# Patient Record
Sex: MALE | Race: AMERICAN INDIAN OR ALASKA NATIVE | NOT HISPANIC OR LATINO | Employment: UNEMPLOYED | URBAN - METROPOLITAN AREA
[De-identification: names, ages, dates, MRNs, and addresses within clinical notes are randomized per-mention and may not be internally consistent; named-entity substitution may affect disease eponyms.]

---

## 2022-09-30 ENCOUNTER — MEDICAL CORRESPONDENCE (OUTPATIENT)
Dept: HEALTH INFORMATION MANAGEMENT | Facility: CLINIC | Age: 37
End: 2022-09-30

## 2023-07-12 ENCOUNTER — TRANSFERRED RECORDS (OUTPATIENT)
Dept: HEALTH INFORMATION MANAGEMENT | Facility: CLINIC | Age: 38
End: 2023-07-12

## 2023-09-06 ENCOUNTER — TELEPHONE (OUTPATIENT)
Dept: WOUND CARE | Facility: CLINIC | Age: 38
End: 2023-09-06
Payer: COMMERCIAL

## 2023-09-06 ENCOUNTER — MEDICAL CORRESPONDENCE (OUTPATIENT)
Dept: HEALTH INFORMATION MANAGEMENT | Facility: CLINIC | Age: 38
End: 2023-09-06

## 2023-09-06 NOTE — TELEPHONE ENCOUNTER
Consult received via Fax from Lou Contreras PA-C for wound of the Left foot. Please call Kristina Referral coordinator at [phone] to schedule.    Please schedule with Dr. Koehler, Dr. Simmons, Kisha Floyd PA-C, or Meseret Ding NP at [facility] [place] for next available appointment.    **For all providers, Kisha Floyd PA-C, Dr. Koehler, Meseret Ding NP or Dr. Lake, please schedule a follow up 2-3 weeks after initial appointment.**  --If unable to schedule within 2-3 weeks then please place on cancellation list--    Is the patient able to make their own medical decisions? Yes    Is patient a REMY lift? PLEASE INQUIRE WHEN MAKING THE APPOINTMENT AND PUT IN APPOINTMENT NOTES    Routing to  Wound Healing Scheduling.

## 2023-09-07 NOTE — TELEPHONE ENCOUNTER
Kristina from patient PCP office called to schedule appointment for patient.    Kristina- [phone]

## 2023-10-06 ENCOUNTER — TELEPHONE (OUTPATIENT)
Dept: OTHER | Facility: CLINIC | Age: 38
End: 2023-10-06

## 2023-10-06 ENCOUNTER — HOSPITAL ENCOUNTER (OUTPATIENT)
Dept: WOUND CARE | Facility: CLINIC | Age: 38
Discharge: HOME | End: 2023-10-06
Attending: PHYSICIAN ASSISTANT | Admitting: PHYSICIAN ASSISTANT
Payer: COMMERCIAL

## 2023-10-06 VITALS — SYSTOLIC BLOOD PRESSURE: 101 MMHG | TEMPERATURE: 97.3 F | HEART RATE: 81 BPM | DIASTOLIC BLOOD PRESSURE: 55 MMHG

## 2023-10-06 DIAGNOSIS — L97.422 SKIN ULCER OF LEFT HEEL WITH FAT LAYER EXPOSED (H): Primary | ICD-10-CM

## 2023-10-06 PROCEDURE — 97602 WOUND(S) CARE NON-SELECTIVE: CPT

## 2023-10-06 PROCEDURE — 99205 OFFICE O/P NEW HI 60 MIN: CPT | Performed by: PHYSICIAN ASSISTANT

## 2023-10-06 PROCEDURE — G0463 HOSPITAL OUTPT CLINIC VISIT: HCPCS | Mod: 25

## 2023-10-06 NOTE — TELEPHONE ENCOUNTER
Future Appointments   Date Time Provider Department Center   10/18/2023  1:10 PM SHUS5 LINNEA [facility]   10/18/2023  2:40 PM [place] Moctezuma MD [facility]       Visit guide faxed to care facility at 12:50 on 10/6  [phone]

## 2023-10-06 NOTE — PROGRESS NOTES
Patient arrived for wound care visit. Accompany by nurse Weaver from [facility]. Wound Care Nurse time spent evaluating patient record, completed a full evaluation and documented wound(s) & emma-wound skin; provided recommendation based on treatment plan. Applied dressing, reviewed discharge instructions, patient education, and discussed plan of care with appropriate medical team staff members and patient and/or family members.

## 2023-10-06 NOTE — DISCHARGE INSTRUCTIONS
Jody Jenkins      1985    A DME order for supplies has been placed to [facility], [address]. If there are any issues with your order including not receiving the order please call [facility] at [phone] option 1. They can also provide a tracking number for you if you had supplies shipped to you.  Future DME orders may go to [facility]    Dressing changes outside of clinic are being performed by  shelter staff    [facility]; nurse Meg; phone [phone] fax [phone]  Meg will teach staff to do dressing changes; can be done daily or twice daily if needed    -Vascular medicine consult pending per discussion 10/6/23  Vascular medicine consult order entered.  Please call the scheduling  to schedule your ABIs bilateral appointment at [facility]: [phone]     OK to shower; remove dressings prior to shower; reapply immediately following shower.     Wound Dressing Change: left plantar heel  - Wash your hands with soap and water before you begin your dressing change and prepare a clean surface for dressings.  - Cleanse with mild unscented soap (such as Cetaphil, Cerave or Dove) and water  - Apply small amount of VASHE on gauze, lay into wound bed, let sit for 10 minutes, remove gauze (do not rinse)   - Apply cut to fit 1/12th piece of 4x5 Hydrofera Blue Transfer Ready foam into wound, full contact to wound surfaces, as wound filler daily  - cover with two 4x4 gauze  - cover with one 5x9 ABD gauze   - secure with one roll of conforming gauze  - tape with medical tape  - wear sleeve of Spandgrip F from toes to ankle and Spandigrip G from ankle to below knee for edema control  - once you get the Velcro compression sleeves for calf and foot, you may wear these instead of spandigrip  Change daily and if wet, saturated    Prevalon boots recommended; this is an out of pocket cost; available on [employer]. This would serve as pressure relief.  Pressure relief is going to assist healing. Offload heel as much as possible, whether seated or lying. This means floating your heel so that no pressure at all is endured to wound.     Compression:   Your compression is Spandigrip F to feet, G to calf today (and use until velcro compression wraps arrive as ordered) and can be removed at night and put back on first thing in the morning.   Please remove compression dressing if toes turn blue and/or tingle and can not be relieved by raising the leg for one hour. If unable to reapply in the morning, keep compression on until next dressing change.    Walk as much as you can, as you are able. Whenever you sit raise your ankle above your hips to promote wound healing.       Natty Floyd PA-C October 6, 2023    Call us at [phone] if you have any questions about your wounds, have redness or swelling around your wound, have a fever of 101 degrees Fahrenheit or greater or if you have any other problems or concerns. We answer the phone Monday through Friday 8 am to 4 pm, please leave a message as we check the voicemail frequently throughout the day.     If you had a positive experience please indicate that on your patient satisfaction survey form that [facility] will be sending you.    It was a pleasure meeting with you today.  Thank you for allowing me and my team the privilege of caring for you today.  YOU are the reason we are here, and I truly hope we provided you with the excellent service you deserve.  Please let us know if there is anything else we can do for you so that we can be sure you are leaving completely satisfied with your care experience.      If you have any billing related questions please call the [facility] Business office at [phone]. The clinic staff does not handle billing related matters.    If you are scheduled to have a follow up appointment, you will receive a reminder call the day before your visit. On the appointment day please  arrive 15 minutes prior to your appointment time. If you are unable to keep that appointment, please call the clinic to cancel or reschedule. If you are more than 10 minutes late or greater for your scheduled appointment time, the clinic policy is that you may be asked to reschedule.

## 2023-10-06 NOTE — PROGRESS NOTES
[facility]    ASSESSMENT:   (L97.422) Skin ulcer of left heel with fat layer exposed (H)  (primary encounter diagnosis)  Absent pedal pulse  Lymphedema  Hx of MRI suspicious for early osteomyelitis treated via antibiotics only  Cognitive impairment - unable to make own medical decisions    PLAN/DISCUSSION:   Discuss repeating MRI at next appointment  Patient has guardian - they have been contacted to get paperwork before any debridements can be completed  Agree with PCP on vascular surgery consult due to absent DP pulse  Wound care plan: Hydrofera Blue transfer ready, roll gauze, velcro or spandagrip. Dressing will be performed by facility staff See bottom of note for detailed wound care and patient instructions  Instructed him to keep pressure off his foot    HISTORY OF PRESENT ILLNESS:   Jody Jenkins is a 38 year old male with cognitive impairment, TBI, antisocial personality disorder, quadriplegia, venous insufficiency, hx of DVT who presents today for a left heel ulcer.  This started sometime in early 2022 after he was traveling a lot and developed a blister on the bottom of his foot. It sounds like at that time he was having issues with self care and homelessness and eventually underwent guardianship. He was hospitalized several for the infection related to the wound. First, in March of 2022 at [facility] in [place]. Underwent operative excision with bone cultures. The culture were positive and he was placed on prolonged oral antibiotic treatment. He was hospitalized again in September of 2022 and there was some question of early osteomyelitis on MRI. A BKA was suggested but declined by the guardian as he was not sick.  Since then he has been cared for primarily by [facility] PCP and his home care staff. They have had difficulty obtaining dressings for him.     TREATMENT COURSE:  10/6/2023 : Presents for initial visit at our clinic.     VITALS: /55 (BP Location: Right arm, Patient  Position: Chair, Cuff Size: Adult Regular)   Pulse 81   Temp 97.3  F (36.3  C) (Temporal)      PHYSICAL EXAM:  GENERAL: Patient is alert and oriented and in no acute distress  CV: PT pulse palpable, absent DP pulse  INTEGUMENTARY:   Wound (used by OP [facility] only) 10/06/23 0905 Left plantar heel (Active)   Thickness/Stage Stage 3 10/06/23 0905   Base pink;yellow;slough 10/06/23 0905   Periwound dry;macerated 10/06/23 0905   Periwound Temperature warm 10/06/23 0905   Periwound Skin Turgor firm 10/06/23 0905   Edges callused;open;rolled/closed 10/06/23 0905   Length (cm) 3 10/06/23 0905   Width (cm) 2.5 10/06/23 0905   Depth (cm) 1 10/06/23 0905   Wound (cm^2) 7.5 cm^2 10/06/23 0905   Wound Volume (cm^3) 7.5 cm^3 10/06/23 0905   Drainage Characteristics/Odor serosanguineous 10/06/23 0905   Drainage Amount moderate 10/06/23 0905   Care, Wound non-select wound debridement performed 10/06/23 0905             MDM: 60 minutes were spent on the date of the visit reviewing previous chart notes, evaluating patient and developing the treatment plan, this excludes any time spent on procedures numerous notes reviewed as referenced in chart, multiple problems assessed    PATIENT INSTRUCTIONS      Further instructions from your care team         Jody Jenkins      1985    A DME order for supplies has been placed to [facility], [address]. If there are any issues with your order including not receiving the order please call [facility] at [phone] option 1. They can also provide a tracking number for you if you had supplies shipped to you.  Future DME orders may go to [facility]    Dressing changes outside of clinic are being performed by  [facility] staff    [facility]; nurse Meg; phone [phone] fax [phone]  Meg will teach staff to do dressing changes; can be done daily or twice daily if needed    -Vascular medicine consult pending per discussion 10/6/23  Vascular medicine  consult order entered    OK to shower; remove dressings prior to shower; reapply immediately following shower.     Wound Dressing Change: left plantar heel  - Wash your hands with soap and water before you begin your dressing change and prepare a clean surface for dressings.  - Cleanse with mild unscented soap (such as Cetaphil, Cerave or Dove) and water  - Apply small amount of VASHE on gauze, lay into wound bed, let sit for 10 minutes, remove gauze (do not rinse)   - Apply cut to fit 1/12th piece of 4x5 Hydrofera Blue Transfer Ready foam into wound, full contact to wound surfaces, as wound filler daily  - cover with two 4x4 gauze  - cover with one 5x9 ABD gauze   - secure with one roll of conforming gauze  - tape with medical tape  - wear sleeve of Spandgrip F from toes to ankle and Spandigrip G from ankle to below knee for edema control  Change daily and if wet, saturated    Prevalon boots recommended; this is an out of pocket cost; available on [employer]. This would serve as pressure relief. Pressure relief is going to assist healing.    Compression:   Your compression is Spandigrip F to feet, G to calf today (and use until velcro compression wraps arrive as ordered) and can be removed at night and put back on first thing in the morning.   Please remove compression dressing if toes turn blue and/or tingle and can not be relieved by raising the leg for one hour. If unable to reapply in the morning, keep compression on until next dressing change.    Walk as much as you can, as you are able. Whenever you sit raise your ankle above your hips to promote wound healing.       Natty Floyd PA-C October 6, 2023    Call us at [phone] if you have any questions about your wounds, have redness or swelling around your wound, have a fever of 101 degrees Fahrenheit or greater or if you have any other problems or concerns. We answer the phone Monday through Friday 8 am to 4 pm, please leave a message as we check the  voicemail frequently throughout the day.     If you had a positive experience please indicate that on your patient satisfaction survey form that [facility] will be sending you.    It was a pleasure meeting with you today.  Thank you for allowing me and my team the privilege of caring for you today.  YOU are the reason we are here, and I truly hope we provided you with the excellent service you deserve.  Please let us know if there is anything else we can do for you so that we can be sure you are leaving completely satisfied with your care experience.      If you have any billing related questions please call the [facility] Business office at [phone]. The clinic staff does not handle billing related matters.    If you are scheduled to have a follow up appointment, you will receive a reminder call the day before your visit. On the appointment day please arrive 15 minutes prior to your appointment time. If you are unable to keep that appointment, please call the clinic to cancel or reschedule. If you are more than 10 minutes late or greater for your scheduled appointment time, the clinic policy is that you may be asked to reschedule.          Electronically signed by Natty Folyd PA-C on October 6, 2023

## 2023-10-06 NOTE — TELEPHONE ENCOUNTER
Referral received via [employer] on 10/6/23.    Pt referred to [facility] by Natty Floyd PA-C for non-healing wound.     Pt needs to be scheduled for CASSANDRA US ordered by referring provider (Natty Floyd) and NEW VASCULAR PATIENT consult with vascular surgery.  Will route to scheduling to coordinate an appointment next week.    Appt note:  Pt referred to [facility] by Natty Floyd PA-C for non-healing wound. CASSANDRA US prior    Karina ALBARADO, RN    [facility]  Office: [phone]  Fax: [phone]

## 2023-10-10 ENCOUNTER — TELEPHONE (OUTPATIENT)
Dept: WOUND CARE | Facility: CLINIC | Age: 38
End: 2023-10-10
Payer: COMMERCIAL

## 2023-10-10 NOTE — TELEPHONE ENCOUNTER
The guardian for Jody returned call to the clinic. He will fax the guardian paperwork to [facility]. His name is Candelario Cote Office phone [phone]  Cell [phone]. Guardian paperwork sent to Barry Aldrich.

## 2023-10-17 ENCOUNTER — DOCUMENTATION ONLY (OUTPATIENT)
Dept: OTHER | Facility: CLINIC | Age: 38
End: 2023-10-17
Payer: COMMERCIAL

## 2023-10-18 ENCOUNTER — HOSPITAL ENCOUNTER (OUTPATIENT)
Dept: ULTRASOUND IMAGING | Facility: CLINIC | Age: 38
Discharge: HOME | End: 2023-10-18
Attending: PHYSICIAN ASSISTANT
Payer: COMMERCIAL

## 2023-10-18 ENCOUNTER — OFFICE VISIT (OUTPATIENT)
Dept: OTHER | Facility: CLINIC | Age: 38
End: 2023-10-18
Attending: PHYSICIAN ASSISTANT
Payer: COMMERCIAL

## 2023-10-18 VITALS — HEART RATE: 84 BPM | DIASTOLIC BLOOD PRESSURE: 69 MMHG | SYSTOLIC BLOOD PRESSURE: 102 MMHG

## 2023-10-18 DIAGNOSIS — L97.422 SKIN ULCER OF LEFT HEEL WITH FAT LAYER EXPOSED (H): ICD-10-CM

## 2023-10-18 DIAGNOSIS — E66.01 MORBID OBESITY (H): ICD-10-CM

## 2023-10-18 DIAGNOSIS — G82.50 SPASTIC TETRAPLEGIA (H): Primary | ICD-10-CM

## 2023-10-18 DIAGNOSIS — R41.89 COGNITIVE IMPAIRMENT: ICD-10-CM

## 2023-10-18 PROCEDURE — G0463 HOSPITAL OUTPT CLINIC VISIT: HCPCS | Performed by: SURGERY

## 2023-10-18 PROCEDURE — 93922 UPR/L XTREMITY ART 2 LEVELS: CPT

## 2023-10-18 PROCEDURE — 99203 OFFICE O/P NEW LOW 30 MIN: CPT | Performed by: SURGERY

## 2023-10-18 RX ORDER — OXYCODONE AND ACETAMINOPHEN 5; 325 MG/1; MG/1
TABLET ORAL
COMMUNITY
Start: 2022-10-03 | End: 2023-10-26

## 2023-10-18 RX ORDER — ACETAMINOPHEN 325 MG/1
TABLET ORAL
COMMUNITY
Start: 2022-10-04 | End: 2023-10-18

## 2023-10-18 NOTE — PATIENT INSTRUCTIONS
Lack of healing on the wound on your foot is not due to poor circulation  The MRI you had done in [place] showed possible bone infection  Would recommend referral to a surgical podiatrist  We will update Natty Floyd PA-C (the provider that referred you to us) and she should be able to provide a referral to a surgical podiatrist  No further vascular surgery follow up indicated at this time

## 2023-10-18 NOTE — PROGRESS NOTES
[facility]        Patient is here for a consult to discuss Wound.    Pt is currently taking no meds that would impact our treatment plan.    /69 (BP Location: Right arm, Patient Position: Chair, Cuff Size: Adult Regular)   Pulse 84     The provider has been notified that the patient has no concerns.     Questions patient would like addressed today are: N/A.    Refills are needed: N/A    Has homecare services and agency name:  Sintia Valverde MA
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
## 2023-10-31 ENCOUNTER — HOSPITAL ENCOUNTER (OUTPATIENT)
Dept: WOUND CARE | Facility: CLINIC | Age: 38
Discharge: HOME | End: 2023-10-31
Attending: PHYSICIAN ASSISTANT | Admitting: PHYSICIAN ASSISTANT
Payer: COMMERCIAL

## 2023-10-31 VITALS
HEART RATE: 84 BPM | RESPIRATION RATE: 20 BRPM | DIASTOLIC BLOOD PRESSURE: 68 MMHG | TEMPERATURE: 98.8 F | SYSTOLIC BLOOD PRESSURE: 114 MMHG

## 2023-10-31 DIAGNOSIS — G89.29 OTHER CHRONIC PAIN: ICD-10-CM

## 2023-10-31 DIAGNOSIS — L97.422 SKIN ULCER OF LEFT HEEL WITH FAT LAYER EXPOSED (H): Primary | ICD-10-CM

## 2023-10-31 PROCEDURE — 97602 WOUND(S) CARE NON-SELECTIVE: CPT

## 2023-10-31 PROCEDURE — 99214 OFFICE O/P EST MOD 30 MIN: CPT | Performed by: PHYSICIAN ASSISTANT

## 2023-10-31 RX ORDER — OXYCODONE AND ACETAMINOPHEN 5; 325 MG/1; MG/1
TABLET ORAL
COMMUNITY
Start: 2023-10-31 | End: 2023-11-24

## 2023-10-31 NOTE — DISCHARGE INSTRUCTIONS
Jody Jenkins      1985    A DME order for supplies has been placed to [facility]. If there are any issues with your order including not receiving the order please call [facility] at [phone]. They can also provide a tracking number for you.    Dressing changes outside of clinic are being performed by  alf Staff     [facility]; nurse Meg; phone [phone] fax [phone]  Meg will teach staff to do dressing changes; can be done daily or twice daily if needed     PLAN:  Establish care with Primary Care Physician to manage your pain medications - consider trying a nerve blocking medication like gabapentin  Vascular medicine consult order entered.  Please call the scheduling  to schedule your ABIs bilateral appointment at [facility]: [phone]   OK to shower; remove dressings prior to shower; reapply immediately following shower.      Wound Dressing Change: left plantar heel  - Wash your hands with soap and water before you begin your dressing change and prepare a clean surface for dressings.  - Cleanse with mild unscented soap (such as Cetaphil, Cerave or Dove) and water  - Apply small amount of VASHE on gauze, lay into wound bed, let sit for 10 minutes, remove gauze (do not rinse)   - Apply cut to fit 1/10th piece of 4x5 Hydrofera Blue Transfer Ready foam into wound, full contact to wound surfaces, as wound filler daily  - cover with one 4x4 gauze  - cover with 1/2 5x9 ABD gauze   - secure with one roll of conforming gauze  - tape with medical tape  - wear sleeve of Spandgrip F from toes to ankle and Spandigrip G from ankle to below knee for edema control  - once you get the Velcro compression sleeves for calf and foot, you may wear these instead of spandigrip  Change daily and as needed for soilage/saturation     Prevalon boots recommended; this is an out of pocket cost; available on [employer]. This would serve as pressure relief. Pressure  relief is going to assist healing. Offload heel as much as possible, whether seated or lying. This means floating your heel so that no pressure at all is endured to wound.      Compression:   Your compression is Spandigrip F to feet, G to calf today (and use until velcro compression wraps arrive as ordered) and can be removed at night and put back on first thing in the morning.   Please remove compression dressing if toes turn blue and/or tingle and can not be relieved by raising the leg for one hour. If unable to reapply in the morning, keep compression on until next dressing change.     Walk as much as you can, as you are able. Whenever you sit raise your ankle above your hips to promote wound healing.           Natty Floyd PA-C October 31, 2023    Call us at [phone] if you have any questions about your wounds, have redness or swelling around your wound, have a fever of 101 degrees Fahrenheit or greater or if you have any other problems or concerns. We answer the phone Monday through Friday 8 am to 4 pm, please leave a message as we check the voicemail frequently throughout the day.     If you had a positive experience please indicate that on your patient satisfaction survey form that [facility] will be sending you.    It was a pleasure meeting with you today.  Thank you for allowing me and my team the privilege of caring for you today.  YOU are the reason we are here, and I truly hope we provided you with the excellent service you deserve.  Please let us know if there is anything else we can do for you so that we can be sure you are leaving completely satisfied with your care experience.      If you have any billing related questions please call the [facility] Business office at [phone]. The clinic staff does not handle billing related matters.    If you are scheduled to have a follow up appointment, you will receive a reminder call the day before your visit. On the appointment day please  arrive 15 minutes prior to your appointment time. If you are unable to keep that appointment, please call the clinic to cancel or reschedule. If you are more than 10 minutes late or greater for your scheduled appointment time, the clinic policy is that you may be asked to reschedule.

## 2023-10-31 NOTE — PROGRESS NOTES
"Patient Active Problem List   Diagnosis    Skin ulcer of left heel with fat layer exposed (H)     No past medical history on file.  Labs: No results for input(s): \"ALBUMIN\", \"GLUCOSE\", \"HGB\", \"INR\", \"WBC\", \"A1C\", \"CRP\" in the last 76309 hours.    Invalid input(s): \"PREALBUMIN\", \"MICROBIO\"  Nutrition requirements were discussed with patient today.  Vitals:  /68 (BP Location: Right arm, Patient Position: Chair)   Pulse 84   Temp 98.8  F (37.1  C) (Temporal)   Resp 20   Wound:   Wound (used by OP [facility] only) 10/06/23 0905 Left plantar heel (Active)   Thickness/Stage Stage 3 10/31/23 1405   Base pink 10/31/23 1405   Periwound macerated;intact;swelling 10/31/23 1405   Periwound Temperature warm 10/31/23 1405   Periwound Skin Turgor firm 10/31/23 1405   Edges callused;open 10/31/23 1405   Length (cm) 2.5 10/31/23 1405   Width (cm) 2.9 10/31/23 1405   Depth (cm) 1 10/31/23 1405   Wound (cm^2) 7.25 cm^2 10/31/23 1405   Wound Volume (cm^3) 7.25 cm^3 10/31/23 1405   Wound healing % 3.33 10/31/23 1405   Drainage Characteristics/Odor serosanguineous 10/31/23 1405   Drainage Amount large 10/31/23 1405   Care, Wound non-select wound debridement performed. 10/31/23 1405      Photo:         Further instructions from your care team         Jody Jenkins      1985    A DME order for supplies has been placed to [facility]. If there are any issues with your order including not receiving the order please call [facility] at [phone]. They can also provide a tracking number for you.    Dressing changes outside of clinic are being performed by  intermediate Staff     [facility]; nurse Meg; phone [phone] fax [phone]  Meg will teach staff to do dressing changes; can be done daily or twice daily if needed     PLAN:  Establish care with Primary Care Physician to manage your pain medications - consider trying a nerve blocking medication like gabapentin  Vascular medicine consult order " entered.  Please call the scheduling  to schedule your ABIs bilateral appointment at [facility]: [phone]   OK to shower; remove dressings prior to shower; reapply immediately following shower.      Wound Dressing Change: left plantar heel  - Wash your hands with soap and water before you begin your dressing change and prepare a clean surface for dressings.  - Cleanse with mild unscented soap (such as Cetaphil, Cerave or Dove) and water  - Apply small amount of VASHE on gauze, lay into wound bed, let sit for 10 minutes, remove gauze (do not rinse)   - Apply cut to fit 1/10th piece of 4x5 Hydrofera Blue Transfer Ready foam into wound, full contact to wound surfaces, as wound filler daily  - cover with one 4x4 gauze  - cover with 1/2 5x9 ABD gauze   - secure with one roll of conforming gauze  - tape with medical tape  - wear sleeve of Spandgrip F from toes to ankle and Spandigrip G from ankle to below knee for edema control  - once you get the Velcro compression sleeves for calf and foot, you may wear these instead of spandigrip  Change daily and as needed for soilage/saturation     Prevalon boots recommended; this is an out of pocket cost; available on [employer]. This would serve as pressure relief. Pressure relief is going to assist healing. Offload heel as much as possible, whether seated or lying. This means floating your heel so that no pressure at all is endured to wound.      Compression:   Your compression is Spandigrip F to feet, G to calf today (and use until velcro compression wraps arrive as ordered) and can be removed at night and put back on first thing in the morning.   Please remove compression dressing if toes turn blue and/or tingle and can not be relieved by raising the leg for one hour. If unable to reapply in the morning, keep compression on until next dressing change.     Walk as much as you can, as you are able. Whenever you sit raise your ankle above your hips to  promote wound healing.           Natty Floyd PA-C October 31, 2023    Call us at [phone] if you have any questions about your wounds, have redness or swelling around your wound, have a fever of 101 degrees Fahrenheit or greater or if you have any other problems or concerns. We answer the phone Monday through Friday 8 am to 4 pm, please leave a message as we check the voicemail frequently throughout the day.     If you had a positive experience please indicate that on your patient satisfaction survey form that [facility] will be sending you.    It was a pleasure meeting with you today.  Thank you for allowing me and my team the privilege of caring for you today.  YOU are the reason we are here, and I truly hope we provided you with the excellent service you deserve.  Please let us know if there is anything else we can do for you so that we can be sure you are leaving completely satisfied with your care experience.      If you have any billing related questions please call the [facility] Business office at [phone]. The clinic staff does not handle billing related matters.    If you are scheduled to have a follow up appointment, you will receive a reminder call the day before your visit. On the appointment day please arrive 15 minutes prior to your appointment time. If you are unable to keep that appointment, please call the clinic to cancel or reschedule. If you are more than 10 minutes late or greater for your scheduled appointment time, the clinic policy is that you may be asked to reschedule.

## 2023-10-31 NOTE — PROGRESS NOTES
[facility]    ASSESSMENT:   (L97.422) Skin ulcer of left heel with fat layer exposed (H)  (primary encounter diagnosis)  Lymphedema  Hx of MRI suspicious for early osteomyelitis treated via antibiotics only  Cognitive impairment - unable to make own medical decisions, legal guardian in place    PLAN/DISCUSSION:   After the visit I did thorough chart review and was unable to find records of a bone biopsy, I will have our staff try to track this down  Encouraged him to wear compression around the clock  Wound care plan: Hydrofera Blue transfer ready, roll gauze, velcro or spandagrip. Dressing will be performed by facility staff See bottom of note for detailed wound care and patient instructions  Instructed him to keep pressure off his foot    INTERVAL HX:  10/18: Met with Dr. Moctezuma with Vascular. Determined adequate arterial perfusion for healing.   10/31/23: Returns to clinic. We discussed today that he had an MRI suspicious for early osteomyelitis in September of 2022 and that I would like to repeat an MRI. He reports that he has since had a bone biopsy that was negative. He is happy with progress of wound and would like to hold off on any further imaging. The wound appears slightly improved but not significantly changed from last visit. He is not wearing compression at the visit although he reports that he is using it regularly. Reports continued significant drainage. He continues to be bothered by pain to the tough.     HISTORY OF PRESENT ILLNESS:   Jody Jenkins is a 38 year old male with cognitive impairment, TBI, antisocial personality disorder, quadriplegia, venous insufficiency, hx of DVT who presents today for a left heel ulcer.  This started sometime in early 2022 after he was traveling a lot and developed a blister on the bottom of his foot. It sounds like at that time he was having issues with self care and homelessness and eventually underwent guardianship. He was hospitalized  several for the infection related to the wound. First, in March of 2022 at [facility] in [place]. Underwent operative excision with bone cultures. The culture were positive and he was placed on prolonged oral antibiotic treatment. He was hospitalized again in September of 2022 and there was some question of early osteomyelitis on MRI. A BKA was suggested but declined by the guardian as he was not sick.  Since then he has been cared for primarily by [facility] PCP and his home care staff. They have had difficulty obtaining dressings for him.     TREATMENT COURSE:  10/6/2023 : Presents for initial visit at our clinic. Recommended Hydrofera Blue Transfer, roll gauze, velcro or spandagrip. Referred to vascular surgery for consult.    VITALS: /68 (BP Location: Right arm, Patient Position: Chair)   Pulse 84   Temp 98.8  F (37.1  C) (Temporal)   Resp 20      PHYSICAL EXAM:  GENERAL: Patient is alert and oriented and in no acute distress  CV: PT pulse palpable, absent DP pulse  INTEGUMENTARY:   Wound (used by OP [facility] only) 10/06/23 0905 Left plantar heel (Active)   Thickness/Stage Stage 3 10/31/23 1405   Base pink 10/31/23 1405   Periwound macerated;intact;swelling 10/31/23 1405   Periwound Temperature warm 10/31/23 1405   Periwound Skin Turgor firm 10/31/23 1405   Edges callused;open 10/31/23 1405   Length (cm) 2.5 10/31/23 1405   Width (cm) 2.9 10/31/23 1405   Depth (cm) 1 10/31/23 1405   Wound (cm^2) 7.25 cm^2 10/31/23 1405   Wound Volume (cm^3) 7.25 cm^3 10/31/23 1405   Wound healing % 3.33 10/31/23 1405   Drainage Characteristics/Odor serosanguineous 10/31/23 1405   Drainage Amount large 10/31/23 1405   Care, Wound non-select wound debridement performed. 10/31/23 1405                 MDM: 30 minutes were spent on the date of the visit reviewing previous chart notes, evaluating patient and developing the treatment plan, this excludes any time spent on procedures.     PATIENT INSTRUCTIONS      Further instructions  from your care team         Jody Jenkins      1985    A DME order for supplies has been placed to [facility]. If there are any issues with your order including not receiving the order please call [facility] at [phone]. They can also provide a tracking number for you.    Dressing changes outside of clinic are being performed by  half-way Staff     [facility]; nurse Meg; phone [phone] fax [phone]  Meg will teach staff to do dressing changes; can be done daily or twice daily if needed     PLAN:  Establish care with Primary Care Physician to manage your pain medications - consider trying a nerve blocking medication like gabapentin  Vascular medicine consult order entered.  Please call the scheduling  to schedule your ABIs bilateral appointment at [facility]: [phone]   OK to shower; remove dressings prior to shower; reapply immediately following shower.      Wound Dressing Change: left plantar heel  - Wash your hands with soap and water before you begin your dressing change and prepare a clean surface for dressings.  - Cleanse with mild unscented soap (such as Cetaphil, Cerave or Dove) and water  - Apply small amount of VASHE on gauze, lay into wound bed, let sit for 10 minutes, remove gauze (do not rinse)   - Apply cut to fit 1/10th piece of 4x5 Hydrofera Blue Transfer Ready foam into wound, full contact to wound surfaces, as wound filler daily  - cover with one 4x4 gauze  - cover with 1/2 5x9 ABD gauze   - secure with one roll of conforming gauze  - tape with medical tape  - wear sleeve of Spandgrip F from toes to ankle and Spandigrip G from ankle to below knee for edema control  - once you get the Velcro compression sleeves for calf and foot, you may wear these instead of spandigrip  Change daily and as needed for soilage/saturation     Prevalon boots recommended; this is an out of pocket cost; available on [employer]. This would serve as  pressure relief. Pressure relief is going to assist healing. Offload heel as much as possible, whether seated or lying. This means floating your heel so that no pressure at all is endured to wound.      Compression:   Your compression is Spandigrip F to feet, G to calf today (and use until velcro compression wraps arrive as ordered) and can be removed at night and put back on first thing in the morning.   Please remove compression dressing if toes turn blue and/or tingle and can not be relieved by raising the leg for one hour. If unable to reapply in the morning, keep compression on until next dressing change.     Walk as much as you can, as you are able. Whenever you sit raise your ankle above your hips to promote wound healing.           Natty Floyd PA-C October 31, 2023    Call us at [phone] if you have any questions about your wounds, have redness or swelling around your wound, have a fever of 101 degrees Fahrenheit or greater or if you have any other problems or concerns. We answer the phone Monday through Friday 8 am to 4 pm, please leave a message as we check the voicemail frequently throughout the day.     If you had a positive experience please indicate that on your patient satisfaction survey form that [facility] will be sending you.    It was a pleasure meeting with you today.  Thank you for allowing me and my team the privilege of caring for you today.  YOU are the reason we are here, and I truly hope we provided you with the excellent service you deserve.  Please let us know if there is anything else we can do for you so that we can be sure you are leaving completely satisfied with your care experience.      If you have any billing related questions please call the [facility] Business office at [phone]. The clinic staff does not handle billing related matters.    If you are scheduled to have a follow up appointment, you will receive a reminder call the day before your visit. On the  appointment day please arrive 15 minutes prior to your appointment time. If you are unable to keep that appointment, please call the clinic to cancel or reschedule. If you are more than 10 minutes late or greater for your scheduled appointment time, the clinic policy is that you may be asked to reschedule.

## 2023-11-01 ENCOUNTER — TELEPHONE (OUTPATIENT)
Dept: WOUND CARE | Facility: CLINIC | Age: 38
End: 2023-11-01
Payer: COMMERCIAL

## 2023-11-01 NOTE — TELEPHONE ENCOUNTER
Patient reported that he has had a negative bone biopsy excluding osteomyelitis at yesterday's visit. After visit I performed chart review and from what I can find the last bone specimens were taken in March of 2022 which were positive for infection. I cannot find a bone biopsy since then that was negative. Please call patient/guardian and try to clarify if he has had a biopsy since that sample was taken and details on where/when that was performed so we can try to track down records.

## 2023-11-01 NOTE — TELEPHONE ENCOUNTER
"Spoke with patient. He said that's correct--the last biopsy was March 2022, He said he was told the \"condition wasn't deteriorating and there was not cause for alarm.\"    Relayed that information to Kisha Floyd PA-C. She states that a reason his wound is not resolving could be bone infection. She recommends an MRI.  If patient wishes to move forward with this, will order it.  If not, will discuss at next visit.    Called patient, but he just left for an appointment.  Left message for patient to call back.      Awaiting return call.  "

## 2023-11-03 NOTE — TELEPHONE ENCOUNTER
Informed patient of Kisha Floyd's recommendations. He maintains that he was told wound was healing and unsure why MRI is needed. He wishes to discuss further at next office visit. Kisha Floyd PA-C aware.

## 2023-11-07 ENCOUNTER — MEDICAL CORRESPONDENCE (OUTPATIENT)
Dept: HEALTH INFORMATION MANAGEMENT | Facility: CLINIC | Age: 38
End: 2023-11-07
Payer: COMMERCIAL

## 2023-12-28 ENCOUNTER — TELEPHONE (OUTPATIENT)
Dept: WOUND CARE | Facility: CLINIC | Age: 38
End: 2023-12-28
Payer: COMMERCIAL

## 2023-12-28 NOTE — TELEPHONE ENCOUNTER
Left message with Meg/group home to inform: [facility] does not arrange with guardian. Provided group home phone # to guardian as listed in Epic. Reiterated that guardian is needed for this appointment.

## 2023-12-28 NOTE — TELEPHONE ENCOUNTER
[facility]    Who is the name of the provider?:  CHOLO SALAZAR   What is the location you see this provider at/preferred location?: [place]  Person calling / Facility:  Meg from Group home  Phone number:  [phone]  Nurse call back needed:  yes     Reason for call:  Has appt on Tuesday w/Kisha.  Note says guardian needs to be a part of appt.   Group home calling to confirm that has been arranged before they set up ride for pt.   Please call     Pharmacy location:  Data Unavailable  Outside Imaging: n/a   Can we leave a detailed message on this number?  YES     12/28/2023, 9:35 AM

## 2024-01-02 ENCOUNTER — HOSPITAL ENCOUNTER (OUTPATIENT)
Dept: WOUND CARE | Facility: CLINIC | Age: 39
Discharge: HOME | End: 2024-01-02
Attending: PHYSICIAN ASSISTANT | Admitting: PHYSICIAN ASSISTANT
Payer: COMMERCIAL

## 2024-01-02 VITALS — DIASTOLIC BLOOD PRESSURE: 67 MMHG | HEART RATE: 80 BPM | SYSTOLIC BLOOD PRESSURE: 106 MMHG | TEMPERATURE: 98.7 F

## 2024-01-02 DIAGNOSIS — L97.422 SKIN ULCER OF LEFT HEEL WITH FAT LAYER EXPOSED (H): Primary | ICD-10-CM

## 2024-01-02 PROCEDURE — 99214 OFFICE O/P EST MOD 30 MIN: CPT | Mod: 25 | Performed by: PHYSICIAN ASSISTANT

## 2024-01-02 PROCEDURE — 11042 DBRDMT SUBQ TIS 1ST 20SQCM/<: CPT | Performed by: PHYSICIAN ASSISTANT

## 2024-01-02 NOTE — DISCHARGE INSTRUCTIONS
Jody Jenkins      1985    A DME order for supplies has been placed to [facility].   If there are any issues with your order including not receiving the order please call [facility] at [phone].   They can also provide a tracking number for you.    Dressing changes outside of clinic are being performed by alf Staff    [facility]; nurse Meg; phone [phone] fax [phone]   Meg will teach staff to do dressing changes; can be done daily or twice daily if needed     PLAN: 01/02/2024  - as discussed today with patient and guardian, [facility] provider strongly recommended an MRI of your heel to determine whether bone infection is present and causing the delay in healing; the order for MRI has been submitted and you and your guardian may choose to complete the MRI; if desired, call the scheduling  to schedule your MRI appointment at [facility] imaging for all locations: [phone]   Lou Contreras with [facility] as Primary Care providers; consider trying a nerve blocking medication like gabapentin  Vascular medicine consult done 10/2023  ABIs bilateral done 10/2023   OK to shower; remove dressings prior to shower; reapply immediately following shower.      Wound Dressing Change: left plantar heel  - Wash your hands with soap and water before you begin your dressing change and prepare a clean surface for dressings.  - Cleanse with mild unscented soap (such as Cetaphil, Cerave or Dove) and water  - Apply small amount of VASHE on gauze, lay into wound bed, let sit for 10 minutes, remove gauze (do not rinse)   - Apply cut to fit 1/10th piece of 4x5 alginate AG into wound, full contact to wound surfaces, as wound filler daily  - cover with one 4x4 gauze  - cover with 1/2 5x9 ABD gauze   - secure with one roll of conforming gauze  - tape with medical tape  - wear sleeve of Spandgrip F from toes to ankle and Spandigrip G from ankle to below knee for  edema control  - once you get the Velcro compression sleeves for calf and foot, you may wear these instead of spandigrip  Change daily and as needed for soilage/saturation     Prevalon boots recommended; this is an out of pocket cost; available on [employer]. This would serve as pressure relief. Pressure relief is going to assist healing. Offload heel as much as possible, whether seated or lying. This means floating your heel so that no pressure at all is endured to wound.      Compression: Spandigrip F to feet, G to calf today (and use until velcro compression wraps arrive as ordered) and can be removed at night and put back on first thing in the morning.   Please remove compression dressing if toes turn blue and/or tingle and can not be relieved by raising the leg for one hour. If unable to reapply in the morning, keep compression on until next dressing change.  Walk as much as you can, as you are able. Whenever you sit raise your ankle above your hips to promote wound healing.        Natty Floyd PA-C January 2, 2024    Call us at [phone] if you have any questions about your wounds, have redness or swelling around your wound, have a fever of 101 degrees Fahrenheit or greater or if you have any other problems or concerns. We answer the phone Monday through Friday 8 am to 4 pm, please leave a message as we check the voicemail frequently throughout the day.     If you had a positive experience please indicate that on your patient satisfaction survey form that [facility] will be sending you.  It was a pleasure meeting with you today.  Thank you for allowing me and my team the privilege of caring for you today.  YOU are the reason we are here, and I truly hope we provided you with the excellent service you deserve.  Please let us know if there is anything else we can do for you so that we can be sure you are leaving completely satisfied with your care experience.      If you have any billing related  questions please call the [facility] Business office at [phone]. The clinic staff does not handle billing related matters.  If you are scheduled to have a follow up appointment, you will receive a reminder call the day before your visit. On the appointment day please arrive 15 minutes prior to your appointment time. If you are unable to keep that appointment, please call the clinic to cancel or reschedule. If you are more than 10 minutes late or greater for your scheduled appointment time, the clinic policy is that you may be asked to reschedule.

## 2024-01-02 NOTE — PROGRESS NOTES
[facility]    ASSESSMENT:   (L97.422) Skin ulcer of left heel with fat layer exposed (H)  (primary encounter diagnosis)  Lymphedema  Hx of MRI suspicious for early osteomyelitis treated via antibiotics only  Adequate arterial perfusion per vascular MD on 10/18/23  Cognitive impairment - unable to make own medical decisions, legal guardian in place    PLAN/DISCUSSION:   We had discussion about my concern for possible chronic osteomyelitis due to lack of progress since October. The risk of untreated chronic osteomyelitis includes worsening infection and/or sepsis and/or loss of limb. If osteomyelitis found we could consider a more conservative debridement (not necessarily a BKA) or just palliative treatment with expectation that wound will not heal. Jody is very opposed to doing an MRI for unclear reasoning. His guardian was present during this entire discussion and is aware of my concerns and Jody's feelings. We will place the MRI order and they can continue to discuss and think about it.   Encouraged him to wear compression around the clock  Wound care plan: change to AquaCel Ag, roll gauze, velcro or spandagrip. Dressing will be performed by facility staff See bottom of note for detailed wound care and patient instructions  Instructed him to keep pressure off his foot    INTERVAL HX:  01/02/24: Returns to clinic. He has declined an MRI to his foot since our last visit despite recommendations. Wound measuring slightly deeper, about the same diameter. No obvious signs of infection. He is feeling well and at baseline health.    HISTORY OF PRESENT ILLNESS:   Jody Jenkins is a 38 year old male with cognitive impairment, TBI, antisocial personality disorder, quadriplegia, venous insufficiency, hx of DVT who presents today for a left heel ulcer.  This started sometime in early 2022 after he was traveling a lot and developed a blister on the bottom of his foot. It sounds like at that time he  was having issues with self care and homelessness and eventually underwent guardianship. He was hospitalized several for the infection related to the wound. First, in March of 2022 at [facility] in [place]. Underwent operative excision with bone cultures. The culture were positive and he was placed on prolonged oral antibiotic treatment. He was hospitalized again in September of 2022 and there was some question of early osteomyelitis on MRI. A BKA was suggested but declined by the guardian as he was not sick.  Since then he has been cared for primarily by [facility] PCP and his home care staff. They have had difficulty obtaining dressings for him.     TREATMENT COURSE:  10/6/2023 : Presents for initial visit at our clinic. Recommended Hydrofera Blue Transfer, roll gauze, velcro or spandagrip. Referred to vascular surgery for consult.  10/18: Dr. Moctezuma with Vascular determined adequate arterial perfusion for healing.   10/31/23: Returns to clinic. We discussed today that he had an MRI suspicious for early osteomyelitis in September of 2022 and that I would like to repeat an MRI. He is under the impression that he has since had a bone biopsy that was negative, however his only biopsy was in March and was +. He is happy with progress of wound and would like to hold off on any further imaging. The wound appears slightly improved but not significantly changed from last visit. He is not wearing compression at the visit although he reports that he is using it regularly. Reports continued significant drainage. He continues to be bothered by pain to the tough.     VITALS: /67   Pulse 80   Temp 98.7  F (37.1  C) (Temporal)      PHYSICAL EXAM:  GENERAL: Patient is alert and oriented and in no acute distress  CV: PT pulse palpable, absent DP pulse  INTEGUMENTARY:   Wound (used by OP [facility] only) 10/06/23 0905 Left plantar heel (Active)   Thickness/Stage Stage 3 01/02/24 1400   Base slough 01/02/24 1400   Periwound  macerated;swelling 01/02/24 1400   Periwound Temperature warm 01/02/24 1400   Periwound Skin Turgor firm 01/02/24 1400   Edges callused 01/02/24 1400   Length (cm) 3 01/02/24 1400   Width (cm) 2.6 01/02/24 1400   Depth (cm) 1.6 01/02/24 1400   Wound (cm^2) 7.8 cm^2 01/02/24 1400   Wound Volume (cm^3) 12.48 cm^3 01/02/24 1400   Wound healing % -4 01/02/24 1400   Drainage Characteristics/Odor serosanguineous 01/02/24 1400   Drainage Amount large 01/02/24 1400   Care, Wound debrided 01/02/24 1400     PROCEDURE: Nursing staff performed mechanical debridement with dressing removal and cleansing and then applied 4% lidocaine to the wound bed. Pt has a legal guardian, Candelario Kauffman, who provided informed consent. Using a curette a surgical debridement was performed down to and including  subcutaneous tissue of <20cm2. Hemostasis was achieved with pressure. The patient tolerated the procedure well.      MDM: 30 minutes were spent on the date of the visit reviewing previous chart notes, evaluating patient and developing the treatment plan, this excludes any time spent on procedures.         Further instructions from your care team         Jody Jenkins      1985    A DME order for supplies has been placed to [facility].   If there are any issues with your order including not receiving the order please call [facility] at [phone].   They can also provide a tracking number for you.    Dressing changes outside of clinic are being performed by care home Staff    [facility]; nurse Meg; phone [phone] fax [phone]   Meg will teach staff to do dressing changes; can be done daily or twice daily if needed     PLAN: 01/02/2024  - as discussed today with patient and guardian, [facility] provider strongly recommended an MRI of your heel to determine whether bone infection is present and causing the delay in healing; the order for MRI has been submitted and you and your guardian may choose to complete  the MRI; if desired, call the scheduling  to schedule your MRI appointment at [facility] imaging for all locations: [phone]   Lou Contreras with [facility] as Primary Care providers; consider trying a nerve blocking medication like gabapentin  Vascular medicine consult done 10/2023  ABIs bilateral done 10/2023   OK to shower; remove dressings prior to shower; reapply immediately following shower.      Wound Dressing Change: left plantar heel  - Wash your hands with soap and water before you begin your dressing change and prepare a clean surface for dressings.  - Cleanse with mild unscented soap (such as Cetaphil, Cerave or Dove) and water  - Apply small amount of VASHE on gauze, lay into wound bed, let sit for 10 minutes, remove gauze (do not rinse)   - Apply cut to fit 1/10th piece of 4x5 alginate AG into wound, full contact to wound surfaces, as wound filler daily  - cover with one 4x4 gauze  - cover with 1/2 5x9 ABD gauze   - secure with one roll of conforming gauze  - tape with medical tape  - wear sleeve of Spandgrip F from toes to ankle and Spandigrip G from ankle to below knee for edema control  - once you get the Velcro compression sleeves for calf and foot, you may wear these instead of spandigrip  Change daily and as needed for soilage/saturation     Prevalon boots recommended; this is an out of pocket cost; available on [employer]. This would serve as pressure relief. Pressure relief is going to assist healing. Offload heel as much as possible, whether seated or lying. This means floating your heel so that no pressure at all is endured to wound.      Compression: Spandigrip F to feet, G to calf today (and use until velcro compression wraps arrive as ordered) and can be removed at night and put back on first thing in the morning.   Please remove compression dressing if toes turn blue and/or tingle and can not be relieved by raising the leg for one hour. If unable to  reapply in the morning, keep compression on until next dressing change.  Walk as much as you can, as you are able. Whenever you sit raise your ankle above your hips to promote wound healing.        Natty Floyd PA-C January 2, 2024    Call us at [phone] if you have any questions about your wounds, have redness or swelling around your wound, have a fever of 101 degrees Fahrenheit or greater or if you have any other problems or concerns. We answer the phone Monday through Friday 8 am to 4 pm, please leave a message as we check the voicemail frequently throughout the day.     If you had a positive experience please indicate that on your patient satisfaction survey form that [facility] will be sending you.  It was a pleasure meeting with you today.  Thank you for allowing me and my team the privilege of caring for you today.  YOU are the reason we are here, and I truly hope we provided you with the excellent service you deserve.  Please let us know if there is anything else we can do for you so that we can be sure you are leaving completely satisfied with your care experience.      If you have any billing related questions please call the [facility] Business office at [phone]. The clinic staff does not handle billing related matters.  If you are scheduled to have a follow up appointment, you will receive a reminder call the day before your visit. On the appointment day please arrive 15 minutes prior to your appointment time. If you are unable to keep that appointment, please call the clinic to cancel or reschedule. If you are more than 10 minutes late or greater for your scheduled appointment time, the clinic policy is that you may be asked to reschedule.

## 2024-01-03 ENCOUNTER — TELEPHONE (OUTPATIENT)
Dept: WOUND CARE | Facility: CLINIC | Age: 39
End: 2024-01-03
Payer: COMMERCIAL

## 2024-01-03 ENCOUNTER — MEDICAL CORRESPONDENCE (OUTPATIENT)
Dept: HEALTH INFORMATION MANAGEMENT | Facility: CLINIC | Age: 39
End: 2024-01-03
Payer: COMMERCIAL

## 2024-01-03 NOTE — ADDENDUM NOTE
Encounter addended by: Sonja Street RN on: 1/3/2024 10:57 AM   Actions taken: Edited Discharge Instructions, Order list changed, Diagnosis association updated

## 2024-01-03 NOTE — TELEPHONE ENCOUNTER
Discussed with Kisha Floyd PA-C who requests iodosorb gel or iodoflex if available and if not aquacel LOOKSIMA. Call returned to [facility]. Iodosorb or iodoflex is not available. Order updated to aquacel ag and refaxed to [facility].

## 2024-01-03 NOTE — TELEPHONE ENCOUNTER
[facility](Pharmacy) called in regards to an order for dressings. States they do not carry thye IOPLEX dressing and would like to know if there is an alternative and if the order can be placed for that.    [facility] [phone]

## 2024-01-03 NOTE — ADDENDUM NOTE
Encounter addended by: Mirtha Leiva, RN on: 1/3/2024 3:29 PM   Actions taken: Order list changed, Diagnosis association updated

## 2024-04-18 ENCOUNTER — HOSPITAL ENCOUNTER (OUTPATIENT)
Dept: WOUND CARE | Facility: CLINIC | Age: 39
Discharge: HOME | End: 2024-04-18
Payer: COMMERCIAL

## 2024-04-18 VITALS — HEART RATE: 76 BPM | SYSTOLIC BLOOD PRESSURE: 110 MMHG | TEMPERATURE: 97.2 F | DIASTOLIC BLOOD PRESSURE: 74 MMHG

## 2024-04-18 DIAGNOSIS — L97.422 SKIN ULCER OF LEFT HEEL WITH FAT LAYER EXPOSED (H): Primary | ICD-10-CM

## 2024-04-18 PROCEDURE — 99203 OFFICE O/P NEW LOW 30 MIN: CPT

## 2024-04-18 PROCEDURE — 97602 WOUND(S) CARE NON-SELECTIVE: CPT

## 2024-04-18 NOTE — DISCHARGE INSTRUCTIONS
04/18/2024   Solmagen Rogervas   1985      A DME order for supplies has been placed to [facility].  If there are any issues with your order including not receiving the order please call  [facility] at [phone]. They can also provide a tracking number for you.    Dressing changes outside of clinic are being performed by halfway Staff    [facility]; nurse Meg; phone [phone] fax [phone]  Meg will teach staff to do dressing changes; can be done daily or twice daily if needed    PLAN:  -Contact [facility] to help with offloading boots, Vashe, and velcro compression wraps.   -If you have any fevers of chills or feel sick go to the ED right away.   -Can speak with primary care about lorazepam for other antianxiety medication to take prior to MRI  -as discussed today with patient and guardian, [facility] provider strongly recommended  an MRI of your heel to determine whether bone infection is present and causing the  delay in healing; the order for MRI has been submitted and you and your guardian  may choose to complete the MRI; if desired, call the scheduling  to  schedule your MRI appointment at [facility] imaging for all locations: [phone]  -Lou Contreras with [facility] as Primary Care providers; consider trying a nerve blocking medication like gabapentin  -Vascular medicine consult done 10/2023  -ABIs bilateral done 10/2023    OK to shower; remove dressings prior to shower; reapply immediately  following shower.    Wound Dressing Change: left plantar heel  - Wash your hands with soap and water before you begin your dressing change and prepare a clean surface for dressings.  - Cleanse with mild unscented soap (such as Cetaphil, Cerave or Dove) and water  - Apply small amount of VASHE on gauze, lay into wound bed, let sit for 10 minutes, remove gauze (do not rinse)  - Apply cut to fit 1/10th roll of AMD gauze and make full contact to wound  surfaces, as wound filler daily  - cover with 1/2 5x9 ABD gauze  - secure with one roll of conforming gauze  - tape with medical tape  - wear sleeve of Spandgrip F from toes to ankle and Spandigrip G from ankle to below knee for edema control  - once you get the Velcro compression sleeves for calf and foot, you may wear these instead of spandigrip  Change daily and as needed for soilage/saturation  Speak to your CADI waiver to get Prevalon boots or Rooke boots covered as they are recommended; this is an out of pocket cost; available on [employer].  Other wise offload your feet when your are in bed or sitting and float the heels using pillows so nothing is touching the heels.     This would serve as pressure relief. Pressure relief is going to assist healing. Offload heel  as much as possible, whether seated or lying. This means floating your heel so that no  pressure at all is endured to wound.    Compression: Spandigrip F to feet, G to calf today (and use until velcro compression  wraps arrive as ordered) and can be removed at night and put back on first thing in the morning.  Please remove compression dressing if toes turn blue and/or tingle and can not be  relieved by raising the leg for one hour. If unable to reapply in the morning, keep  compression on until next dressing change.    Walk as much as you can, as you are able. Whenever you sit raise your ankle  above your hips to promote wound healing.    A diet high in protein is important for wound healing, we recommend getting 90 grams of protein per day. Taking protein shakes or bars are a good way to get extra protein in your diet.     Good sources of protein:  Pork 26g per 3 oz  Whey protein powder - 24g per scoop (on average)  Greek yogurt - 23g per 8oz   Chicken or Turkey - 23g per 3oz  Fish - 20-25g per 3oz  Beef - 18-23g per 3oz  Tofu - 10g per 1/2 cup  Navy beans - 20g per cup  Cottage cheese - 14g per 1/2 cup   Lentils - 13g per 1/4 cup  Beef jerky 13g per  1oz  2% milk - 8g per cup  Peanut butter - 8g per 2 tablespoons  Eggs - 6g per egg  Mixed nuts - 6g per 2oz       Main Provider: Meseret Ding NP April 18, 2024    Call us at [phone] if you have any questions about your wounds, if you have redness or swelling around your wound, have a fever of 101 degrees Fahrenheit or greater or if you have any other problems or concerns. We answer the phone Monday through Friday 8 am to 4 pm, please leave a message as we check the voicemail frequently throughout the day. If you have a concern over the weekend, please leave a message and we will return your call Monday. If the need is urgent, go to the ER or urgent care.    If you had a positive experience please indicate that on your patient satisfaction survey form that [facility] will be sending you.    It was a pleasure meeting with you today.  Thank you for allowing me and my team the privilege of caring for you today.  YOU are the reason we are here, and I truly hope we provided you with the excellent service you deserve.  Please let us know if there is anything else we can do for you so that we can be sure you are leaving completely satisfied with your care experience.      If you have any billing related questions please call the [facility] Business office at [phone]. The clinic staff does not handle billing related matters.    If you are scheduled to have a follow up appointment, you will receive a reminder call the day before your visit. On the appointment day please arrive 15 minutes prior to your appointment time. If you are unable to keep that appointment, please call the clinic to cancel or reschedule. If you are more than 10 minutes late or greater for your scheduled appointment time, the clinic policy is that you may be asked to reschedule.

## 2024-04-18 NOTE — PROGRESS NOTES
[facility]    ASSESSMENT:   (L97.422) Skin ulcer of left heel with fat layer exposed (H)  (primary encounter diagnosis)  Lymphedema  Hx of MRI suspicious for early osteomyelitis treated via antibiotics only  Adequate arterial perfusion per vascular MD on 10/18/23  Cognitive impairment - unable to make own medical decisions, legal guardian in place    PLAN/DISCUSSION:   Continued discussion about concern for possible chronic osteomyelitis and possible worsening infection due to lack of progress since October with worsening size and erythema of the periwound. The patient's RN was present at time of appointment. Has not had MRI and has been previously opposed to MRI, however discussed with his PCP recently and is willing to proceed. They will be scheduling MRI shortly. The risk of untreated chronic osteomyelitis includes worsening infection and/or sepsis and/or loss of limb. If osteomyelitis found we could consider a more conservative debridement (not necessarily a BKA) or just palliative treatment with expectation that wound will not heal   Encouraged him to wear compression around the clock  Wound care plan: change to AMD gauze, roll gauze, velcro or spandagrip. Dressing will be performed by facility staff. See bottom of note for detailed wound care and patient instructions  Instructed him to keep pressure off his foot    Interval Hx:   4/18/2024: Returns to clinic, has not had MRI to his foot since last visit despite recommendations. Wound measuring slightly larger, depth unchanged. Erythema noted of the periwound. Feeling well and at baseline health. He is now agreeable to MRI.       HISTORY OF PRESENT ILLNESS:   Jody Jenkins is a 38 year old male with cognitive impairment, TBI, antisocial personality disorder, quadriplegia, venous insufficiency, hx of DVT who presents today for a left heel ulcer.  This started sometime in early 2022 after he was traveling a lot and developed a blister on  the bottom of his foot. It sounds like at that time he was having issues with self care and homelessness and eventually underwent guardianship. He was hospitalized several for the infection related to the wound. First, in March of 2022 at [facility] in [place]. Underwent operative excision with bone cultures. The culture were positive and he was placed on prolonged oral antibiotic treatment. He was hospitalized again in September of 2022 and there was some question of early osteomyelitis on MRI. A BKA was suggested but declined by the guardian as he was not sick.  Since then he has been cared for primarily by [facility] PCP and his home care staff. They have had difficulty obtaining dressings for him.      TREATMENT COURSE:  10/6/2023 : Presents for initial visit at our clinic. Recommended Hydrofera Blue Transfer, roll gauze, velcro or spandagrip. Referred to vascular surgery for consult.  10/18: Dr. Moctezuma with Vascular determined adequate arterial perfusion for healing.   10/31/23: Returns to clinic. We discussed today that he had an MRI suspicious for early osteomyelitis in September of 2022 and that I would like to repeat an MRI. He is under the impression that he has since had a bone biopsy that was negative, however his only biopsy was in March and was +. He is happy with progress of wound and would like to hold off on any further imaging. The wound appears slightly improved but not significantly changed from last visit. He is not wearing compression at the visit although he reports that he is using it regularly. Reports continued significant drainage. He continues to be bothered by pain to the touch.  01/02/24: Returns to clinic. He has declined an MRI to his foot since our last visit despite recommendations. Wound measuring slightly deeper, about the same diameter. No obvious signs of infection. He is feeling well and at baseline health.   Patient Active Problem List   Diagnosis    Skin ulcer of left heel with  fat layer exposed (H)       No current outpatient medications on file.     No current facility-administered medications for this encounter.       VITALS: /74 (BP Location: Left arm, Patient Position: Sitting)   Pulse 76   Temp 97.2  F (36.2  C) (Temporal)      PHYSICAL EXAM:  GENERAL: Patient is alert and oriented and in no acute distress  INTEGUMENTARY:   Wound (used by OP [facility] only) 10/06/23 0905 Left plantar heel (Active)   Thickness/Stage Stage 3 04/18/24 1222   Base slough;pink;red 04/18/24 1222   Periwound macerated;swelling 04/18/24 1222   Periwound Temperature warm 04/18/24 1222   Periwound Skin Turgor firm 04/18/24 1222   Edges callused 04/18/24 1222   Length (cm) 3.2 04/18/24 1222   Width (cm) 3.2 04/18/24 1222   Depth (cm) 1.2 04/18/24 1222   Wound (cm^2) 10.24 cm^2 04/18/24 1222   Wound Volume (cm^3) 12.29 cm^3 04/18/24 1222   Wound healing % -36.53 04/18/24 1222   Drainage Characteristics/Odor serosanguineous;malodorous 04/18/24 1222   Drainage Amount large 04/18/24 1222   Care, Wound debrided 01/02/24 1400             PROCEDURES: Mechanical debridement was performed with cleansing of the wound by the nursing staff      PATIENT INSTRUCTIONS      Further instructions from your care team         04/18/2024   Jody Jenkins   1985        A DME order for supplies has been placed to [facility].  If there are any issues with your order including not receiving the order please call  [facility] at [phone]. They can also provide a tracking number for you.    Dressing changes outside of clinic are being performed by [facility]ent[facility] Staff    [facility]; nurse Weaver; phone [phone] fax [phone]  Meg will teach staff to do dressing changes; can be done daily or twice daily if needed    PLAN:  -If you have any fevers of chills or feel sick go to the ED right away.   -Can speak with primary care about lorazepam for other antianxiety medication to take prior to MRI  -as  discussed today with patient and guardian, [employer] [facility] provider strongly recommended  an MRI of your heel to determine whether bone infection is present and causing the  delay in healing; the order for MRI has been submitted and you and your guardian  may choose to complete the MRI; if desired, call the scheduling  to  schedule your MRI appointment at [facility] imaging for all locations: [phone]  -Lou Contreras with [facility] as Primary Care providers; consider trying a nerve blocking medication like gabapentin  -Vascular medicine consult done 10/2023  -ABIs bilateral done 10/2023    OK to shower; remove dressings prior to shower; reapply immediately  following shower.    Wound Dressing Change: left plantar heel  - Wash your hands with soap and water before you begin your dressing change and prepare a clean surface for dressings.  - Cleanse with mild unscented soap (such as Cetaphil, Cerave or Dove) and water  - Apply small amount of VASHE on gauze, lay into wound bed, let sit for 10 minutes, remove gauze (do not rinse)  - Apply cut to fit 1/10th piece of AMD gauze and make full contact to wound surfaces, as wound filler daily  - cover with 1/2 5x9 ABD gauze  - secure with one roll of conforming gauze  - tape with medical tape  - wear sleeve of Spandgrip F from toes to ankle and Spandigrip G from ankle to below knee for edema control  - once you get the Velcro compression sleeves for calf and foot, you may wear these instead of spandigrip  Change daily and as needed for soilage/saturation  Speak to your CADI waiver to get Prevalon boots or Rooke boots covered as they are recommended; this is an out of pocket cost; available on [employer].  Other wise offload your feet when your are in bed or sitting and float the     This would serve as pressure relief. Pressure relief is going to assist healing. Offload heel  as much as possible, whether seated or lying. This means floating your heel so  that no  pressure at all is endured to wound.    Compression: Spandigrip F to feet, G to calf today (and use until velcro compression  wraps arrive as ordered) and can be removed at night and put back on first thing in the morning.  Please remove compression dressing if toes turn blue and/or tingle and can not be  relieved by raising the leg for one hour. If unable to reapply in the morning, keep  compression on until next dressing change.    Walk as much as you can, as you are able. Whenever you sit raise your ankle  above your hips to promote wound healing.     Main Provider: Meseret Ding NP April 18, 2024    Call us at [phone] if you have any questions about your wounds, if you have redness or swelling around your wound, have a fever of 101 degrees Fahrenheit or greater or if you have any other problems or concerns. We answer the phone Monday through Friday 8 am to 4 pm, please leave a message as we check the voicemail frequently throughout the day. If you have a concern over the weekend, please leave a message and we will return your call Monday. If the need is urgent, go to the ER or urgent care.    If you had a positive experience please indicate that on your patient satisfaction survey form that [facility] will be sending you.    It was a pleasure meeting with you today.  Thank you for allowing me and my team the privilege of caring for you today.  YOU are the reason we are here, and I truly hope we provided you with the excellent service you deserve.  Please let us know if there is anything else we can do for you so that we can be sure you are leaving completely satisfied with your care experience.      If you have any billing related questions please call the [facility] Business office at [phone]. The clinic staff does not handle billing related matters.    If you are scheduled to have a follow up appointment, you will receive a reminder call the day before your visit. On the appointment day  please arrive 15 minutes prior to your appointment time. If you are unable to keep that appointment, please call the clinic to cancel or reschedule. If you are more than 10 minutes late or greater for your scheduled appointment time, the clinic policy is that you may be asked to reschedule.           Electronically signed by Meseret Dign CNP on April 18, 2024

## 2024-04-25 ENCOUNTER — MEDICAL CORRESPONDENCE (OUTPATIENT)
Dept: HEALTH INFORMATION MANAGEMENT | Facility: CLINIC | Age: 39
End: 2024-04-25
Payer: COMMERCIAL

## 2024-05-08 ENCOUNTER — HOSPITAL ENCOUNTER (OUTPATIENT)
Dept: WOUND CARE | Facility: CLINIC | Age: 39
Discharge: HOME | End: 2024-05-08
Attending: REGISTERED NURSE | Admitting: REGISTERED NURSE
Payer: COMMERCIAL

## 2024-05-08 VITALS — SYSTOLIC BLOOD PRESSURE: 133 MMHG | TEMPERATURE: 98.1 F | HEART RATE: 88 BPM | DIASTOLIC BLOOD PRESSURE: 69 MMHG

## 2024-05-08 DIAGNOSIS — L89.623 PRESSURE INJURY OF LEFT HEEL, STAGE 3 (H): Primary | Chronic | ICD-10-CM

## 2024-05-08 PROBLEM — E66.01 MORBID OBESITY (H): Status: ACTIVE | Noted: 2022-09-01

## 2024-05-08 PROBLEM — S06.9XAA TRAUMATIC BRAIN INJURY (H): Status: ACTIVE | Noted: 2022-04-11

## 2024-05-08 PROBLEM — M62.81 NEUROLOGICAL MUSCLE WEAKNESS: Status: ACTIVE | Noted: 2019-06-04

## 2024-05-08 PROBLEM — S06.9XAA TRAUMATIC BRAIN INJURY (H): Chronic | Status: ACTIVE | Noted: 2022-04-11

## 2024-05-08 PROBLEM — E66.01 MORBID OBESITY (H): Chronic | Status: ACTIVE | Noted: 2022-09-01

## 2024-05-08 PROBLEM — S12.9XXA CERVICAL SPINE FRACTURE (H): Status: RESOLVED | Noted: 2017-01-09 | Resolved: 2024-05-08

## 2024-05-08 PROBLEM — R41.89 COGNITIVE IMPAIRMENT: Chronic | Status: ACTIVE | Noted: 2024-05-08

## 2024-05-08 PROBLEM — F02.818 MAJOR NEUROCOGNITIVE DISORDER DUE TO MULTIPLE ETIOLOGIES WITH BEHAVIORAL DISTURBANCE (H): Chronic | Status: ACTIVE | Noted: 2022-09-09

## 2024-05-08 PROBLEM — A41.9 SEPSIS (H): Status: RESOLVED | Noted: 2022-07-10 | Resolved: 2024-05-08

## 2024-05-08 PROBLEM — I89.0 LYMPHEDEMA: Chronic | Status: ACTIVE | Noted: 2022-06-25

## 2024-05-08 PROBLEM — R79.89 ELEVATED TROPONIN: Status: RESOLVED | Noted: 2021-10-19 | Resolved: 2024-05-08

## 2024-05-08 PROBLEM — N31.9 NEUROGENIC BLADDER: Chronic | Status: ACTIVE | Noted: 2017-01-09

## 2024-05-08 PROBLEM — Z86.718 HISTORY OF DVT (DEEP VEIN THROMBOSIS): Status: RESOLVED | Noted: 2017-04-14 | Resolved: 2024-05-08

## 2024-05-08 PROBLEM — K59.2 NEUROGENIC BOWEL: Chronic | Status: ACTIVE | Noted: 2017-01-09

## 2024-05-08 PROBLEM — K90.41 GLUTEN INTOLERANCE: Status: ACTIVE | Noted: 2019-08-29

## 2024-05-08 PROBLEM — Q86.0 FETAL ALCOHOL SYNDROME: Chronic | Status: ACTIVE | Noted: 2022-09-20

## 2024-05-08 PROBLEM — Z87.2 HISTORY OF PRESSURE ULCER: Status: ACTIVE | Noted: 2018-06-24

## 2024-05-08 PROBLEM — I95.1 ORTHOSTATIC HYPOTENSION: Status: ACTIVE | Noted: 2017-04-14

## 2024-05-08 PROBLEM — R60.0 LOWER EXTREMITY EDEMA: Status: ACTIVE | Noted: 2022-06-25

## 2024-05-08 PROBLEM — G82.50: Chronic | Status: ACTIVE | Noted: 2017-01-09

## 2024-05-08 PROBLEM — Z86.718 HISTORY OF DVT (DEEP VEIN THROMBOSIS): Status: ACTIVE | Noted: 2017-04-14

## 2024-05-08 PROBLEM — L89.313 PRESSURE INJURY OF RIGHT BUTTOCK, STAGE 3 (H): Status: RESOLVED | Noted: 2018-07-11 | Resolved: 2024-05-08

## 2024-05-08 PROBLEM — R62.7 ADULT FAILURE TO THRIVE: Status: RESOLVED | Noted: 2022-02-25 | Resolved: 2024-05-08

## 2024-05-08 PROBLEM — R79.89 ELEVATED D-DIMER: Status: RESOLVED | Noted: 2021-10-19 | Resolved: 2024-05-08

## 2024-05-08 PROBLEM — I26.99 OTHER ACUTE PULMONARY EMBOLISM WITHOUT ACUTE COR PULMONALE (H): Status: RESOLVED | Noted: 2021-10-19 | Resolved: 2024-05-08

## 2024-05-08 PROBLEM — Z98.1 S/P SPINAL FUSION: Chronic | Status: ACTIVE | Noted: 2017-04-14

## 2024-05-08 PROBLEM — M62.81 NEUROLOGICAL MUSCLE WEAKNESS: Chronic | Status: ACTIVE | Noted: 2019-06-04

## 2024-05-08 PROBLEM — S14.109A CERVICAL SPINAL CORD INJURY (H): Chronic | Status: ACTIVE | Noted: 2017-01-09

## 2024-05-08 PROBLEM — L97.422 SKIN ULCER OF LEFT HEEL WITH FAT LAYER EXPOSED (H): Chronic | Status: ACTIVE | Noted: 2023-10-06

## 2024-05-08 PROBLEM — Z59.00 HOMELESS: Status: RESOLVED | Noted: 2018-06-24 | Resolved: 2024-05-08

## 2024-05-08 PROBLEM — Z98.1 S/P SPINAL FUSION: Status: ACTIVE | Noted: 2017-04-14

## 2024-05-08 PROBLEM — U07.1 COVID-19: Status: RESOLVED | Noted: 2021-10-19 | Resolved: 2024-05-08

## 2024-05-08 PROBLEM — K90.41 GLUTEN INTOLERANCE: Chronic | Status: ACTIVE | Noted: 2019-08-29

## 2024-05-08 PROBLEM — Z87.2 HISTORY OF PRESSURE INJURY OF SKIN: Status: RESOLVED | Noted: 2018-06-24 | Resolved: 2024-05-08

## 2024-05-08 PROCEDURE — 97602 WOUND(S) CARE NON-SELECTIVE: CPT

## 2024-05-08 RX ORDER — ACETAMINOPHEN 650 MG
TABLET, EXTENDED RELEASE ORAL DAILY
Qty: 473 ML | Refills: 11 | Status: SHIPPED | OUTPATIENT
Start: 2024-05-08

## 2024-05-08 NOTE — PROGRESS NOTES
"[facility]    ASSESSMENT:   (L89.623) Pressure injury of left heel, stage 3 (H)  (primary encounter diagnosis)  Lymphedema, stasis changes  Spastic tetraplegia, likely increasing shear force in [employer] shoegear  Macerated callus with abnormal texture along the proximal wound edge into the midfoot; reactive  History of MRI suspicious for early osteomyelitis treated via antibiotics only  Adequate arterial perfusion per vascular MD on 10/18/23  Cognitive impairment - unable to make own medical decisions, legal guardian in place    PLAN/DISCUSSION:   Possible chronic osteomyelitis as evidenced by lack of progress since October with worsening size.  MRI scheduled for 5/12/24  Suspect spasticity is causing shear issues with use of standard footwear.  Will plan to get him in with DPM for assessment of footwear recommendations - orthotics versus brace versus other  Encouraged him to wear compression around the clock  Wound care plan: Change to betadine moist AMD gauze, portion of an ABD pad, roll gauze to secure, and Velcro wrap. Dressing will be performed by facility staff. See bottom of note for detailed wound care and patient instructions.    Interval Hx:   5/8/2024: Measuring slightly larger in all dimension.  Arrives to clinic with alginate and a Band-Aid, no compression.  Unreliable historian.  Highly agreeable.  [facility] staff with him is unable to provide much insight.  Jody reports he walks with a walker.  [facility] Home staff report he is wheelchair bound.  Wound Clinic staff report he \"shuffles\" to the chair for exam.  He is wearing Nike shoes.  MRI is scheduled for May 12.  Has spastic movements of the foot when touched.  Denies pain.    HISTORY OF PRESENT ILLNESS:   Jody Jenkins is a 39 year old male with cognitive impairment, TBI, antisocial personality disorder, spastic tetraparesis, lymphedema, and history of a DVT who presents for a left heel wound.  This started sometime in early " 2022 after he was traveling a lot and developed a blister on the bottom of his foot. It sounds like at that time he was having issues with self care and homelessness and eventually underwent guardianship.  He was hospitalized several times for infection(s) related to the wound.  In March of 2022 he underwent operative excision with bone cultures at [facility] in [place]. The cultures were positive and he was placed on prolonged oral antibiotic treatment.  He was hospitalized again in September of 2022 and there was some question of early osteomyelitis on MRI.  A BKA was suggested, but declined by his guardian as he was not acutely ill.  Since then, he has been cared for primarily by his [facility] PCP and his [facility] staff. They have had difficulty obtaining dressings for him.      TREATMENT COURSE:  10/6/23: Presents for Initial Visit.  Recommended Hydrofera Blue Transfer, roll gauze, Velcro or spandagrip.  Referred to Vascular Surgery.  10/18/23: Dr. Moctezuma with Vascular determined adequate arterial perfusion for healing.   10/31/23: We discussed a previous MRI suspicious for early osteomyelitis in September of 2022.  A repeat MRI was recommended.  He is under the impression that he has since had a bone biopsy that was negative, however his only biopsy was in March and was positive.  He is happy with his progress and would like to hold off on any further imaging.  He is not wearing compression at the visit although he reports that he is using it regularly.  1/02/24: He has declined a MRI to his foot since his last visit.  Wound measuring slightly deeper, about the same diameter. No obvious signs of infection. He is feeling well and at baseline health.   4/18/2024: Has not had the MRI.  Wound measuring slightly larger, depth unchanged. Erythema noted of the periwound. Feeling well and at baseline health. He is now agreeable to MRI.     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     VITALS: /69 (BP Location: Left arm, Patient Position: Sitting, Cuff Size: Adult Regular)   Pulse 88   Temp 98.1  F (36.7  C) (Temporal)      PHYSICAL EXAM:  GENERAL: Patient is alert and oriented and in no acute distress  INTEGUMENTARY:   Wound (used by OP [facility] only) 10/06/23 0905 Left plantar heel (Active)   Thickness/Stage Stage 3 05/08/24 1000   Base granulating;slough 05/08/24 1000   Periwound macerated;edematous 05/08/24 1000   Periwound Temperature warm 05/08/24 1000   Periwound Skin Turgor firm 05/08/24 1000   Edges callused 05/08/24 1000   Length (cm) 3.4 05/08/24 1000   Width (cm) 3.5 05/08/24 1000   Depth (cm) 1.3 05/08/24 1000   Wound (cm^2) 11.9 cm^2 05/08/24 1000   Wound Volume (cm^3) 15.47 cm^3 05/08/24 1000   Wound healing % -58.67 05/08/24 1000   Drainage Characteristics/Odor serosanguineous 05/08/24 1000   Drainage Amount large 05/08/24 1000   Care, Wound non-select wound debridement performed. 05/08/24 1000           PROCEDURES: Mechanical debridement was performed with cleansing of the wound by the nursing staff    MDM: No E&M, included in procedure    PATIENT INSTRUCTIONS      Further  "instructions from your care team         05/08/2024   Jody Jenkins   1985  A DME order for supplies has been placed to [facility]. If there are any issues with your order including not receiving the order please call [facility] at [phone]. They can also provide a tracking number for you.  [facility]; Meg RN phone [phone] fax [phone]     PLAN: 5/8/24  Scheduled follow up with DPM for determination of Orthotist Referral for orthotics or brace  Meg will teach staff to do dressing changes; can be done daily or twice daily if needed  Cadi waiver to help with offloading boots, Vashe, and velcro compression wraps.   -If you have any fevers of chills or feel sick go to the ED right away.   5/12/24: MRI of your heel   Contact primary care MD for antianxiety medication to take prior to MRI  -Lou Contreras with [facility] as Primary Care providers; consider trying a nerve blocking medication like gabapentin  -Done: 10/23 Vascular medicine consult; ABIs bilateral      Wound Dressing Change: Left plantar heel  - Prepare clean surface and Wash your hands with soap and water   OK to shower; remove dressings prior to shower; reapply immediately after  Wash with mild unscented soap (such as Baby shampoo) and water  - Apply small amount of VASHE on gauze into wound bed for 10 minutes, remove gauze pat dry  Swab entire wound area with Betadine solution and cotton ball  - cut and fluff 1/10th roll of AMD gauze to fill the wound   - cover with 1 5x9 ABD gauze  - secure with one roll of 4\" conforming gauze  - tape with Medipore 2\" tape  -pull up Spandgrip F from toes to ankle and Spandigrip G from ankle to below knee for edema control  - OR Velcro compression sleeves for calf and foot, instead of spandigrip  Change daily and as needed for soilage/saturation    Wear Prevalon boots to offload your feet when your are in bed or sitting and float the heels using pillows so nothing is touching " the heels.   Compression: Spandigrip F to feet, G to calf  OR Velcro compression wraps arrive as ordered) and can be removed at night and put back on first thing in the morning. Please remove compression dressing if toes turn blue and/or tingle and can not be relieved by raising the leg for one hour. If unable to reapply in the morning, keep compression on until next dressing change.  Whenever you sit raise your ankle above your hips to promote wound healing.     A diet high in protein is important for wound healing, we recommend getting 90 grams of protein per day. Taking protein shakes or bars are a good way to get extra protein in your diet. Pravin supplement 1-2 times a day.   Good sources of protein:  Pork 26g per 3 oz  Whey protein powder - 24g per scoop (on average)  Greek yogurt - 23g per 8oz   Chicken or Turkey - 23g per 3oz  Fish - 20-25g per 3oz  Beef - 18-23g per 3oz  Tofu - 10g per 1/2 cup  Navy beans - 20g per cup  Cottage cheese - 14g per 1/2 cup   Lentils - 13g per 1/4 cup  Beef jerky 13g per 1oz  2% milk - 8g per cup  Peanut butter - 8g per 2 tablespoons  Eggs - 6g per egg  Mixed nuts - 6g per 2oz     Main Provider: Collin Sanchez CNP May 8, 2024    Call us at [phone] if you have any questions about your wounds, if you have redness or swelling around your wound, have a fever of 101 degrees Fahrenheit or greater or if you have any other problems or concerns. We answer the phone Monday through Friday 8 am to 4 pm, please leave a message as we check the voicemail frequently throughout the day. If you have a concern over the weekend, please leave a message and we will return your call Monday. If the need is urgent, go to the ER or urgent care.    If you had a positive experience please indicate that on your patient satisfaction survey form that [facility] will be sending you.  It was a pleasure meeting with you today.  Thank you for allowing me and my team the privilege of caring for you  today.  YOU are the reason we are here, and I truly hope we provided you with the excellent service you deserve.  Please let us know if there is anything else we can do for you so that we can be sure you are leaving completely satisfied with your care experience.      If you have any billing related questions please call the [facility] Business office at [phone]. The clinic staff does not handle billing related matters.  If you are scheduled to have a follow up appointment, you will receive a reminder call the day before your visit. On the appointment day please arrive 15 minutes prior to your appointment time. If you are unable to keep that appointment, please call the clinic to cancel or reschedule. If you are more than 10 minutes late or greater for your scheduled appointment time, the clinic policy is that you may be asked to reschedule.        Electronically signed by: Collin Sanchez, JOSEPHINE, APRN, CNP, CWCN

## 2024-05-08 NOTE — DISCHARGE INSTRUCTIONS
"05/08/2024   Jody Jenkins   1985  A DME order for supplies has been placed to [facility]. If there are any issues with your order including not receiving the order please call [facility] at [phone]. They can also provide a tracking number for you.  [facility]; Meg RN phone [phone] fax [phone]     PLAN: 5/8/24  Scheduled follow up with DPM for determination of Orthotist Referral for orthotics or brace  Meg will teach staff to do dressing changes; can be done daily or twice daily if needed  Cadi waiver to help with offloading boots, Vashe, and velcro compression wraps.   -If you have any fevers of chills or feel sick go to the ED right away.   5/12/24: MRI of your heel   Contact primary care MD for antianxiety medication to take prior to MRI  -Lou Contreras with [facility] as Primary Care providers; consider trying a nerve blocking medication like gabapentin  -Done: 10/23 Vascular medicine consult; ABIs bilateral      Wound Dressing Change: Left plantar heel  - Prepare clean surface and Wash your hands with soap and water   OK to shower; remove dressings prior to shower; reapply immediately after  Wash with mild unscented soap (such as Baby shampoo) and water  - Apply small amount of VASHE on gauze into wound bed for 10 minutes, remove gauze pat dry  Swab entire wound area with Betadine solution and cotton ball  - cut and fluff 1/10th roll of AMD gauze to fill the wound   - cover with 1 5x9 ABD gauze  - secure with one roll of 4\" conforming gauze  - tape with Medipore 2\" tape  -pull up Spandgrip F from toes to ankle and Spandigrip G from ankle to below knee for edema control  - OR Velcro compression sleeves for calf and foot, instead of spandigrip  Change daily and as needed for soilage/saturation    Wear Prevalon boots to offload your feet when your are in bed or sitting and float the heels using pillows so nothing is touching the heels.   Compression: Spandigrip F " to feet, G to calf  OR Velcro compression wraps arrive as ordered) and can be removed at night and put back on first thing in the morning. Please remove compression dressing if toes turn blue and/or tingle and can not be relieved by raising the leg for one hour. If unable to reapply in the morning, keep compression on until next dressing change.  Whenever you sit raise your ankle above your hips to promote wound healing.     A diet high in protein is important for wound healing, we recommend getting 90 grams of protein per day. Taking protein shakes or bars are a good way to get extra protein in your diet. Pravin supplement 1-2 times a day.   Good sources of protein:  Pork 26g per 3 oz  Whey protein powder - 24g per scoop (on average)  Greek yogurt - 23g per 8oz   Chicken or Turkey - 23g per 3oz  Fish - 20-25g per 3oz  Beef - 18-23g per 3oz  Tofu - 10g per 1/2 cup  Navy beans - 20g per cup  Cottage cheese - 14g per 1/2 cup   Lentils - 13g per 1/4 cup  Beef jerky 13g per 1oz  2% milk - 8g per cup  Peanut butter - 8g per 2 tablespoons  Eggs - 6g per egg  Mixed nuts - 6g per 2oz     Main Provider: Collin Sanchez CNP May 8, 2024    Call us at [phone] if you have any questions about your wounds, if you have redness or swelling around your wound, have a fever of 101 degrees Fahrenheit or greater or if you have any other problems or concerns. We answer the phone Monday through Friday 8 am to 4 pm, please leave a message as we check the voicemail frequently throughout the day. If you have a concern over the weekend, please leave a message and we will return your call Monday. If the need is urgent, go to the ER or urgent care.    If you had a positive experience please indicate that on your patient satisfaction survey form that [facility] will be sending you.  It was a pleasure meeting with you today.  Thank you for allowing me and my team the privilege of caring for you today.  YOU are the reason we are here,  and I truly hope we provided you with the excellent service you deserve.  Please let us know if there is anything else we can do for you so that we can be sure you are leaving completely satisfied with your care experience.      If you have any billing related questions please call the [facility] Business office at [phone]. The clinic staff does not handle billing related matters.  If you are scheduled to have a follow up appointment, you will receive a reminder call the day before your visit. On the appointment day please arrive 15 minutes prior to your appointment time. If you are unable to keep that appointment, please call the clinic to cancel or reschedule. If you are more than 10 minutes late or greater for your scheduled appointment time, the clinic policy is that you may be asked to reschedule.

## 2024-05-13 ENCOUNTER — TELEPHONE (OUTPATIENT)
Dept: WOUND CARE | Facility: CLINIC | Age: 39
End: 2024-05-13
Payer: COMMERCIAL

## 2024-05-13 NOTE — TELEPHONE ENCOUNTER
Spoke with [employer] rep who had questions on if the patient needed ABD pads and if patient was going to need them in a few weeks. Writer called phone number in patient chart and was unable to leave a voicemail. Poojar reached out to Meg ANTOINE at patients group home and left voicemail to see if she could assist if patient will be needing ABD pads or not. Will call [facility] to call back at [phone] once Meg returns call.

## 2024-05-13 NOTE — TELEPHONE ENCOUNTER
An unidentified caller with [employer] left a vm 5/10/24 regarding an Rx and chart notes they received. The notes mention specific ABD pads, but the Rx does not. They are requesting changes to reflect the care plan. A phone number was left but only a portion of it was discernible. [phone]_

## 2024-05-14 NOTE — ADDENDUM NOTE
Encounter addended by: Eva Hurtado RN on: 5/14/2024 10:18 AM   Actions taken: Order list changed, Diagnosis association updated

## 2024-05-14 NOTE — ADDENDUM NOTE
Encounter addended by: Eva Hurtado RN on: 5/14/2024 10:29 AM   Actions taken: Order list changed, Diagnosis association updated

## 2024-06-10 ENCOUNTER — HOSPITAL ENCOUNTER (OUTPATIENT)
Dept: WOUND CARE | Facility: CLINIC | Age: 39
Discharge: HOME | End: 2024-06-10
Attending: PODIATRIST | Admitting: PODIATRIST
Payer: COMMERCIAL

## 2024-06-10 VITALS — TEMPERATURE: 96.8 F

## 2024-06-10 DIAGNOSIS — L89.623 PRESSURE INJURY OF LEFT HEEL, STAGE 3 (H): Primary | Chronic | ICD-10-CM

## 2024-06-10 PROCEDURE — 11042 DBRDMT SUBQ TIS 1ST 20SQCM/<: CPT | Performed by: PODIATRIST

## 2024-06-10 PROCEDURE — 99203 OFFICE O/P NEW LOW 30 MIN: CPT | Mod: 25 | Performed by: PODIATRIST

## 2024-06-10 NOTE — DISCHARGE INSTRUCTIONS
06/10/2024   Solmagen Rogervas   1985    A DME order for supplies has been placed to [facility]. If there are any issues with your order including not receiving the order please call [facility] at [phone]. They can also provide a tracking number for you.  [facility]; Meg RN phone [phone] fax [phone]  Call and make another appt for after 7/15 please.     PLAN: 5/8/24  Prioritize MRI to determine whether there is a bone infection which would be a reason that your wound has not improved.  Your wound was debrided today  Scheduled follow up with DPM for determination of Orthotist Referral for orthotics or brace.-  Meg will teach staff to do dressing changes; can be done daily or twice daily if needed.  -Cadi waiver to help with offloading boots such as Prevalon boots, Vashe, and velcro compression wraps. We will contact Ida Arguello to send her a order for the Prevalon boots to see if those may be funded via CADI waiver.   -order for Prevalon boots sent to [facility] 6/11/24; sent order, info to GEORGI Arguello 6/10/24 as well as  6/11/24  -If you have any fevers of chills or feel sick go to the ED right away.   5/12/24: MRI of your heel recommended; Need to reschedule MRI as it was missed due to lack of transportation;Please call the scheduling  to schedule your MRI appointment at [facility] imaging for all locations: [phone]    contact primary care MD for antianxiety medication to take prior to MRI  -Lou Contreras with [facility] as Primary Care providers; consider trying a nerve blocking medication like gabapentin  -Done: 10/23 Vascular medicine consult; ABIs bilateral      Wound Dressing Change: Left plantar heel  - Prepare clean surface and Wash your hands with soap and water   OK to shower; remove dressings prior to shower; reapply immediately after  Wash with mild unscented soap (such as Baby shampoo) and water  - Apply small amount  "of VASHE on gauze into wound bed for 10 minutes, remove gauze pat dry  - Swab entire wound area with Betadine solution available OTC and cotton ball  - cut and fluff 1/10th roll of AMD gauze to fill the wound   - cover with 1 5x9 ABD gauze  - secure with one roll of 4\" conforming gauze  - tape with Medipore 2\" tape  -pull up Spandgrip F from toes to ankle and Spandigrip G from ankle to below knee for edema control  - OR Velcro compression sleeves for calf and foot, instead of spandigrip  Change daily and as needed for soilage/saturation     Wear Prevalon boots to offload your feet when your are in bed or sitting and float the heels using pillows so nothing is touching the heels. We will send an order to your CM to pursue Prevalon boots thru DME supplier. These   Compression: Spandigrip F to feet, G to calf  OR Velcro compression wraps arrive as ordered) and can be removed at night and put back on first thing in the morning. Please remove compression dressing if toes turn blue and/or tingle and can not be relieved by raising the leg for one hour. If unable to reapply in the morning, keep compression on until next dressing change.  Whenever you sit raise your ankle above your hips to promote wound healing.     A diet high in protein is important for wound healing, we recommend getting 90 grams of protein per day. Taking protein shakes or bars are a good way to get extra protein in your diet. Pravin supplement 1-2 times a day.        Main Provider: VINOD Kee.P.M. Mariah 10, 2024    Call us at [phone] if you have any questions about your wounds, if you have redness or swelling around your wound, have a fever of 101 degrees Fahrenheit or greater or if you have any other problems or concerns. We answer the phone Monday through Friday 8 am to 4 pm, please leave a message as we check the voicemail frequently throughout the day. If you have a concern over the weekend, please leave a message and we will return your " call Monday. If the need is urgent, go to the ER or urgent care.    If you had a positive experience please indicate that on your patient satisfaction survey form that [facility] will be sending you.    It was a pleasure meeting with you today.  Thank you for allowing me and my team the privilege of caring for you today.  YOU are the reason we are here, and I truly hope we provided you with the excellent service you deserve.  Please let us know if there is anything else we can do for you so that we can be sure you are leaving completely satisfied with your care experience.      If you have any billing related questions please call the [facility] Business office at [phone]. The clinic staff does not handle billing related matters.    If you are scheduled to have a follow up appointment, you will receive a reminder call the day before your visit. On the appointment day please arrive 15 minutes prior to your appointment time. If you are unable to keep that appointment, please call the clinic to cancel or reschedule. If you are more than 10 minutes late or greater for your scheduled appointment time, the clinic policy is that you may be asked to reschedule.

## 2024-06-10 NOTE — PROGRESS NOTES
[facility] [place] Progress Note    Subject: Patient was seen at wound center by myself for the first time, for his left heel. States ulcer has been present for approx 2 years. Per chart review, patient he underwent operative excision with bone cultures in march of 2022. He was noted to have osteomyelitis a that time and was treated with prolonged oral antibiotic. In sept he also had an infection with signs of osteomyelitis. A BKA was suggested, but was declined by patient and his legal guardian.   -Currently, an MRI has been recommended and ordered. It hasn't been done yet. He lives in a group home and has nurses that assist with his wound care. He uses a motorized chair for mobility. He does use his left heel for transfers.     PMH:   Past Medical History:   Diagnosis Date    Adult failure to thrive 02/25/2022    COVID-19 10/19/2021    Elevated d-dimer 10/19/2021    Elevated troponin 10/19/2021    H/O febrile seizure 09/05/2013    Secondary to fever at 5 yo No subsequent seizures    History of DVT (deep vein thrombosis) 04/14/2017    History of DVT (deep vein thrombosis) December 2016, May 2017, September 2017.   Refused warfarin for treatment      History of pressure injury of skin 06/24/2018    History of pressure ulcer December 2016. Healed by April 2017.      Homeless 06/24/2018    Other acute pulmonary embolism without acute cor pulmonale (H) 10/19/2021    Pressure injury of right buttock, stage 3 (H) 07/11/2018    Sepsis (H) 07/10/2022       Social Hx:   Social History     Socioeconomic History    Marital status: Patient Declined     Spouse name: Not on file    Number of children: Not on file    Years of education: Not on file    Highest education level: Not on file   Occupational History    Not on file   Tobacco Use    Smoking status: Unknown    Smokeless tobacco: Not on file   Substance and Sexual Activity    Alcohol use: Not on file    Drug use: Not on file    Sexual activity: Not on file    Other Topics Concern    Not on file   Social History Narrative    5/2024: Lives in a Group Home     Social Determinants of Health     Financial Resource Strain: Low Risk  (10/15/2021)    [facility], [facility]    Overall Financial Resource Strain (CARDIA)     Difficulty of Paying Living Expenses: Not hard at all   Food Insecurity: No Food Insecurity (10/15/2021)    Received from [facility], [facility]    Hunger Vital Sign     Worried About Running Out of Food in the Last Year: Never true     Ran Out of Food in the Last Year: Never true   Transportation Needs: No Transportation Needs (10/15/2021)    Received from [facility], [facility]    PRAPARE - Transportation     Lack of Transportation (Medical): No     Lack of Transportation (Non-Medical): No   Physical Activity: Not on file   Stress: Not on file   Social Connections: Unknown (2/6/2023)    Received from [facility], [facility]  & [facility]    Social Connections     Frequency of Communication with Friends and Family: Not on file   Interpersonal Safety: Not on file   Housing Stability: Not on file       Surgical Hx:   Past Surgical History:   Procedure Laterality Date    DEBRIDEMENT FOOT Left 03/24/2022    ([facility])    ELBOW DEBRIDEMENT Right 07/01/2020    ([facility])       Allergies:    Allergies   Allergen Reactions    Sulfa Antibiotics      Pt states cannot have sulfa because it reacts with his other medications       Medications:   Current Outpatient Medications   Medication Sig Dispense Refill    povidone-iodine (BETADINE) 10 % topical solution Apply topically daily Apply to left heel wound as directed 473 mL 11     No current facility-administered medications for this  encounter.         Objective:  Vitals:  Temp 96.8  F (36  C) (Temporal)     A1C: None available     General:  Patient is alert and orientated.  NAD     Dermatologic: Left plantar heel ulcer: large and with depth to deep subcutaneous tissue. Gross swelling and edema to plantar heel. No purulence or gross signs of infection. Nontender.     .   Wound (used by OP [facility] only) 10/06/23 0905 Left plantar heel (Active)   Thickness/Stage Stage 3 06/10/24 1348   Base granulating;slough 06/10/24 1348   Periwound macerated;edematous 06/10/24 1348   Periwound Temperature warm 06/10/24 1348   Periwound Skin Turgor firm 06/10/24 1348   Edges callused 06/10/24 1348   Length (cm) 3.6 06/10/24 1348   Width (cm) 3.3 06/10/24 1348   Depth (cm) 1.4 06/10/24 1348   Wound (cm^2) 11.88 cm^2 06/10/24 1348   Wound Volume (cm^3) 16.63 cm^3 06/10/24 1348   Wound healing % -58.4 06/10/24 1348   Drainage Characteristics/Odor serosanguineous 06/10/24 1348   Drainage Amount large 06/10/24 1348   Care, Wound debrided 06/10/24 1348          Vascular: DP & PT pulses are intact & regular bilaterally.  No significant edema or varicosities noted.  CFT and skin temperature is normal to both lower extremities.     Neurologic: Lower extremity sensation is absent.     Musculoskeletal: Patient is nonambulatory.     Imaging:    Right Digital brachial index: 120, index of 0.98  Left Digital Brachial index: 118, index of 0.97     Waveforms: Distal posterior tibial and dorsalis pedis waveforms are  triphasic. Both digital waveforms intact.                                                                      IMPRESSION: Normal CASSANDRA examination    I personally reviewed the images and agree with the reports as stated.      Cultures:  None     Assessment:   Left heel ulcer --> concern for chronic calcaneal osteomyelitis   Spastic tetraplegia    Plan:    -Discussed all findings with patient. Chart and imaging reviewed.   -Discussed with patient at length need for MRI to  evaluate for osteomyelitis. Clinically, doesn't show acute signs of infection, but rearfoot has significant swelling which likely is due to osseous changes.   -Patient and guardian state they had an appt, but then couldn't make it. Will call to reschedule appt.     -Discussed with patient importance of offloading site. Likely receives increased pressure while in wheelchair. Prevalon boot order placed and sent to [facility] with cadi waiver.     -In general, discussed with patient that if calcaneus has significant infection, ulcer will likely not heal.     -No indication for antibiotics or to go to the ED today, but if develops fever, chills, weakness or purulence from wound bed, should call the clinic or go to the ED.     -Debrided site as below.     Procedure:  After verbal consent, per protocol lidocaine was applied to the wound. Excisional debridement was performed on ulcer.   #15 blade was used to debride ulcer down to and including subcutaneous tissue. Bleeding controlled with light pressure.  No drainage noted.   < 20sq cm debrided.  Dry dressing applied to foot.  Patient tolerated procedure well.    Mone Koehler DPM              Further instructions from your care team         06/10/2024   Jody Jenkins   1985    A DME order for supplies has been placed to [facility]. If there are any issues with your order including not receiving the order please call [facility] at [phone]. They can also provide a tracking number for you.  [facility]; Meg RN phone [phone] fax [phone]  Call and make another appt for after 7/15 please.     PLAN: 5/8/24  Prioritize MRI to determine whether there is a bone infection which would be a reason that your wound has not improved.  Your wound was debrided today  Scheduled follow up with DPM for determination of Orthotist Referral for orthotics or brace.-  Meg will teach staff to do dressing changes; can be done daily or twice daily  "if needed.  -Cadi waiver to help with offloading boots such as Prevalon boots, Vashe, and velcro compression wraps. We will contact Ida Arguello to send her a order for the Prevalon boots to see if those may be funded via CADI waiver.   -If you have any fevers of chills or feel sick go to the ED right away.   5/12/24: MRI of your heel recommended; Need to reschedule MRI as it was missed due to lack of transportation;Please call the scheduling  to schedule your MRI appointment at [facility] imaging for all locations: [phone]    contact primary care MD for antianxiety medication to take prior to MRI  -Lou Contreras with [facility] as Primary Care providers; consider trying a nerve blocking medication like gabapentin  -Done: 10/23 Vascular medicine consult; ABIs bilateral      Wound Dressing Change: Left plantar heel  - Prepare clean surface and Wash your hands with soap and water   OK to shower; remove dressings prior to shower; reapply immediately after  Wash with mild unscented soap (such as Baby shampoo) and water  - Apply small amount of VASHE on gauze into wound bed for 10 minutes, remove gauze pat dry  - Swab entire wound area with Betadine solution available OTC and cotton ball  - cut and fluff 1/10th roll of AMD gauze to fill the wound   - cover with 1 5x9 ABD gauze  - secure with one roll of 4\" conforming gauze  - tape with Medipore 2\" tape  -pull up Spandgrip F from toes to ankle and Spandigrip G from ankle to below knee for edema control  - OR Velcro compression sleeves for calf and foot, instead of spandigrip  Change daily and as needed for soilage/saturation     Wear Prevalon boots to offload your feet when your are in bed or sitting and float the heels using pillows so nothing is touching the heels. We will send an order to your CM to pursue Prevalon boots thru DME supplier. These   Compression: Spandigrip F to feet, G to calf  OR Velcro compression wraps arrive as " ordered) and can be removed at night and put back on first thing in the morning. Please remove compression dressing if toes turn blue and/or tingle and can not be relieved by raising the leg for one hour. If unable to reapply in the morning, keep compression on until next dressing change.  Whenever you sit raise your ankle above your hips to promote wound healing.     A diet high in protein is important for wound healing, we recommend getting 90 grams of protein per day. Taking protein shakes or bars are a good way to get extra protein in your diet. Pravin supplement 1-2 times a day.        Main Provider: Mone Koehler D.P.M. Mariah 10, 2024    Call us at [phone] if you have any questions about your wounds, if you have redness or swelling around your wound, have a fever of 101 degrees Fahrenheit or greater or if you have any other problems or concerns. We answer the phone Monday through Friday 8 am to 4 pm, please leave a message as we check the voicemail frequently throughout the day. If you have a concern over the weekend, please leave a message and we will return your call Monday. If the need is urgent, go to the ER or urgent care.    If you had a positive experience please indicate that on your patient satisfaction survey form that [facility] will be sending you.    It was a pleasure meeting with you today.  Thank you for allowing me and my team the privilege of caring for you today.  YOU are the reason we are here, and I truly hope we provided you with the excellent service you deserve.  Please let us know if there is anything else we can do for you so that we can be sure you are leaving completely satisfied with your care experience.      If you have any billing related questions please call the [facility] Business office at [phone]. The clinic staff does not handle billing related matters.    If you are scheduled to have a follow up appointment, you will receive a reminder call the day before  your visit. On the appointment day please arrive 15 minutes prior to your appointment time. If you are unable to keep that appointment, please call the clinic to cancel or reschedule. If you are more than 10 minutes late or greater for your scheduled appointment time, the clinic policy is that you may be asked to reschedule.

## 2024-06-11 ENCOUNTER — TELEPHONE (OUTPATIENT)
Dept: WOUND CARE | Facility: CLINIC | Age: 39
End: 2024-06-11
Payer: COMMERCIAL

## 2024-06-11 NOTE — ADDENDUM NOTE
Encounter addended by: Mone Koehler DPM on: 6/11/2024 12:39 PM   Actions taken: Clinical Note Signed

## 2024-06-11 NOTE — ADDENDUM NOTE
Encounter addended by: Mirtha Leiva RN on: 6/11/2024 12:01 PM   Actions taken: Edited Discharge Instructions

## 2024-06-11 NOTE — TELEPHONE ENCOUNTER
Regarding Prevalon boots:  Order scanned and emailed to patient's CM Ida Arguello at [email or web address] on 6/10/24 late afternoon;  Order faxed to [facility] on 6/11/24 late morning w Ida's info on cover sheet;   Left VM with Ida regarding the order status, need for CADI funds if insurance does not cover, as is necessary for wound healing and prevention;  Her number [phone];  Asked that she call to confirm receipt of emailed order/info and discuss any outstanding documentation needed, if any;  Left clinic number and contact info.

## 2024-06-17 ENCOUNTER — MEDICAL CORRESPONDENCE (OUTPATIENT)
Dept: HEALTH INFORMATION MANAGEMENT | Facility: CLINIC | Age: 39
End: 2024-06-17
Payer: COMMERCIAL

## 2024-06-24 ENCOUNTER — HOSPITAL ENCOUNTER (OUTPATIENT)
Dept: WOUND CARE | Facility: CLINIC | Age: 39
Discharge: HOME | End: 2024-06-24
Attending: PODIATRIST | Admitting: PODIATRIST
Payer: COMMERCIAL

## 2024-06-24 VITALS — SYSTOLIC BLOOD PRESSURE: 97 MMHG | DIASTOLIC BLOOD PRESSURE: 49 MMHG | TEMPERATURE: 96.3 F | HEART RATE: 91 BPM

## 2024-06-24 DIAGNOSIS — L89.623 PRESSURE INJURY OF LEFT HEEL, STAGE 3 (H): Primary | Chronic | ICD-10-CM

## 2024-06-24 PROCEDURE — 97602 WOUND(S) CARE NON-SELECTIVE: CPT

## 2024-06-24 PROCEDURE — 11042 DBRDMT SUBQ TIS 1ST 20SQCM/<: CPT

## 2024-06-24 PROCEDURE — 99213 OFFICE O/P EST LOW 20 MIN: CPT | Performed by: PODIATRIST

## 2024-06-24 NOTE — PROGRESS NOTES
[facility] [place] Progress Note    Subject: Patient was seen at wound center by myself for the first time, for his left heel. States ulcer has been present for approx 2 years. Per chart review, patient he underwent operative excision with bone cultures in march of 2022. He was noted to have osteomyelitis a that time and was treated with prolonged oral antibiotic. In sept he also had an infection with signs of osteomyelitis. A BKA was suggested, but was declined by patient and his legal guardian.   -Currently, an MRI has been recommended and ordered. It hasn't been done yet. He lives in a group home and has nurses that assist with his wound care. He uses a motorized chair for mobility. He does use his left heel for transfers.    6/24: Follows up for left foot. MRI is now scheduled for 7/2. Hasn't been completed. States things are going well with dressing changes, but states he doesn't want any debridement done. Also hasn't gotten prevalon boots, but order was placed.     PMH:   Past Medical History:   Diagnosis Date    Adult failure to thrive 02/25/2022    COVID-19 10/19/2021    Elevated d-dimer 10/19/2021    Elevated troponin 10/19/2021    H/O febrile seizure 09/05/2013    Secondary to fever at 7 yo No subsequent seizures    History of DVT (deep vein thrombosis) 04/14/2017    History of DVT (deep vein thrombosis) December 2016, May 2017, September 2017.   Refused warfarin for treatment      History of pressure injury of skin 06/24/2018    History of pressure ulcer December 2016. Healed by April 2017.      Homeless 06/24/2018    Other acute pulmonary embolism without acute cor pulmonale (H) 10/19/2021    Pressure injury of right buttock, stage 3 (H) 07/11/2018    Sepsis (H) 07/10/2022       Social Hx:   Social History     Socioeconomic History    Marital status: Patient Declined     Spouse name: Not on file    Number of children: Not on file    Years of education: Not on file    Highest education  level: Not on file   Occupational History    Not on file   Tobacco Use    Smoking status: Unknown    Smokeless tobacco: Not on file   Substance and Sexual Activity    Alcohol use: Not on file    Drug use: Not on file    Sexual activity: Not on file   Other Topics Concern    Not on file   Social History Narrative    5/2024: Lives in a Group Home     Social Determinants of Health     Financial Resource Strain: Low Risk  (10/15/2021)    Received from [facility], [facility]    Overall Financial Resource Strain (CARDIA)     Difficulty of Paying Living Expenses: Not hard at all   Food Insecurity: No Food Insecurity (10/15/2021)    [facility] from [facility], [facility]    Hunger Vital Sign     Worried About Running Out of Food in the Last Year: Never true     Ran Out of Food in the Last Year: Never true   Transportation Needs: No Transportation Needs (10/15/2021)    [facility], [facility]    PRAPARE - Transportation     Lack of Transportation (Medical): No     Lack of Transportation (Non-Medical): No   Physical Activity: Not on file   Stress: Not on file   Social Connections: Unknown (2/6/2023)    Received [facility], [facility]    Social Connections     Frequency of Communication with Friends and Family: Not on file   Interpersonal Safety: Not on file   Housing Stability: Not on file       Surgical Hx:   Past Surgical History:   Procedure Laterality Date    DEBRIDEMENT FOOT Left 03/24/2022    ([facility])    ELBOW DEBRIDEMENT Right 07/01/2020    ([facility])       Allergies:    Allergies   Allergen Reactions    Sulfa Antibiotics      Pt states cannot have sulfa because it reacts with his other medications       Medications:    Current Outpatient Medications   Medication Sig Dispense Refill    povidone-iodine (BETADINE) 10 % topical solution Apply topically daily Apply to left heel wound as directed 473 mL 11     No current facility-administered medications for this encounter.         Objective:  Vitals:  BP 97/49 (BP Location: Left arm, Patient Position: Chair, Cuff Size: Adult Large)   Pulse 91   Temp (!) 96.3  F (35.7  C) (Temporal)     A1C: None available      General:  Patient is alert and orientated.  NAD      Dermatologic: Left plantar heel ulcer: large and with depth to deep subcutaneous tissue. Gross swelling and edema to plantar heel. No purulence or gross signs of infection. Nontender. May be slightly smaller.     .   Wound (used by OP [facility] only) 10/06/23 0905 Left plantar heel (Active)   Thickness/Stage Stage 3 06/24/24 1200   Base granulating;slough 06/24/24 1200   Periwound edematous 06/24/24 1200   Periwound Temperature warm 06/24/24 1200   Periwound Skin Turgor firm 06/24/24 1200   Edges callused 06/24/24 1200   Length (cm) 3.1 06/24/24 1200   Width (cm) 3 06/24/24 1200   Depth (cm) 2 06/24/24 1200   Wound (cm^2) 9.3 cm^2 06/24/24 1200   Wound Volume (cm^3) 18.6 cm^3 06/24/24 1200   Wound healing % -24 06/24/24 1200   Drainage Characteristics/Odor serosanguineous 06/24/24 1200   Drainage Amount large 06/24/24 1200   Care, Wound debrided 06/24/24 1200          Vascular: DP & PT pulses are intact & regular bilaterally.  No significant edema or varicosities noted.  CFT and skin temperature is normal to both lower extremities.     Neurologic: Lower extremity sensation is absent.     Musculoskeletal: Patient is nonambulatory.      Imaging:    Right Digital brachial index: 120, index of 0.98  Left Digital Brachial index: 118, index of 0.97     Waveforms: Distal posterior tibial and dorsalis pedis waveforms are  triphasic. Both digital waveforms intact.                                                                       IMPRESSION: Normal CASSANDRA examination     I personally reviewed the images and agree with the reports as stated.        Cultures:  None      Assessment:   Left heel ulcer --> concern for chronic calcaneal osteomyelitis   Spastic tetraplegia     Plan:    -Discussed all findings with patient. Chart and imaging reviewed.   -MRI ordered, but still not completed. Scheduled for 7/2. Discussed with patient that results will help determine next steps.   -No debridement done today, per patient's request. Discussed benefits of debridement, but still declines today.   -Hasn't received prevalon boots. In process per aide that's with him.   -No acute signs of infection to site. Cont dressing plans as below. Per patient, it's going well. Wound bed may be slightly smaller.        Procedure:  After verbal consent, per protocol lidocaine was applied to the wound. Excisional debridement was performed on ulcer.   #15 blade was used to debride ulcer down to and including subcutaneous tissue. Bleeding controlled with light pressure.  No drainage noted.   < 20sq cm debrided.  Dry dressing applied to foot.  Patient tolerated procedure well.    Mone Koehler DPM            Further instructions from your care team         06/24/2024   Jody Jenkins   1985    No DME order was placed today. Last order placed on 6-10-24  [facility]; Meg ANTOINE phone [phone] fax [phone]  Call and make another appt for after 7/15 please.     PLAN 6-24-24  -MRI scheduled for 7-2-24  -Scheduled follow up with DPM for determination of Orthotist Referral for orthotics or brace.-  -Order for Prevalon boots sent to [facility] 6-11-24. Ida Arguello (Madhuri) notified     Wound Dressing Change: Left plantar heel  - Prepare clean surface and Wash your hands with soap and water OK to shower; remove dressings prior to shower; reapply immediately after  - Wash with mild unscented soap (such as Baby shampoo) and water  - Apply small  "amount of VASHE on gauze into wound bed for 10 minutes, remove gauze pat dry  - Swab entire wound area with Betadine solution available OTC and cotton ball  - Cut and fluff 1/10th roll of AMD gauze to fill the wound   - Cover with 1 5x9 ABD gauze  - Secure with one roll of 4\" conforming gauze  - Tape with Medipore 2\" tape  - Pull up Spandgrip F from toes to ankle and Spandigrip G from ankle to below knee for edema control  - OR Velcro compression sleeves for calf and foot, instead of spandigrip  Change daily and as needed for soilage/saturation  Wear Prevalon boots to offload your feet when your are in bed or sitting and float the heels using pillows so nothing is touching the heels. We will send an order to your CM to pursue Prevalon boots thru DME supplier.     Compression:   Spandigrip F to feet, G to calf  OR Velcro compression wraps arrive as ordered) and can be removed at night and put back on first thing in the morning. Please remove compression dressing if toes turn blue and/or tingle and can not be relieved by raising the leg for one hour. If unable to reapply in the morning, keep compression on until next dressing change.  Whenever you sit raise your ankle above your hips to promote wound healing.     Protein:  A diet high in protein is important for wound healing, we recommend getting 90 grams of protein per day.   Taking protein shakes or bars are a good way to get extra protein in your diet. Pravin supplement 1-2 times a day.      Main Provider: DARRYL [email or web address].[email or web address]. June 24, 2024    Call us at [phone] if you have any questions about your wounds, if you have redness or swelling around your wound, have a fever of 101 degrees Fahrenheit or greater or if you have any other problems or concerns. We answer the phone Monday through Friday 8 am to 4 pm, please leave a message as we check the voicemail frequently throughout the day. If you have a concern over the weekend, please leave a message and we " will return your call Monday. If the need is urgent, go to the ER or urgent care.    If you had a positive experience please indicate that on your patient satisfaction survey form that [facility] will be sending you.    It was a pleasure meeting with you today.  Thank you for allowing me and my team the privilege of caring for you today.  YOU are the reason we are here, and I truly hope we provided you with the excellent service you deserve.  Please let us know if there is anything else we can do for you so that we can be sure you are leaving completely satisfied with your care experience.      If you have any billing related questions please call the [facility] Business office at [phone]. The clinic staff does not handle billing related matters.    If you are scheduled to have a follow up appointment, you will receive a reminder call the day before your visit. On the appointment day please arrive 15 minutes prior to your appointment time. If you are unable to keep that appointment, please call the clinic to cancel or reschedule. If you are more than 10 minutes late or greater for your scheduled appointment time, the clinic policy is that you may be asked to reschedule.

## 2024-06-24 NOTE — DISCHARGE INSTRUCTIONS
"06/24/2024   Jody Jenkins   1985    No DME order was placed today. Last order placed on 6-10-24  [facility]; Meg RN phone [phone] fax [phone]  Call and make another appt for after 7/15 please.     PLAN 6-24-24  -MRI scheduled for 7-2-24  -Scheduled follow up with DPM for determination of Orthotist Referral for orthotics or brace.-  -Order for Prevalon boots sent to [facility] 6-11-24. [facility]     Wound Dressing Change: Left plantar heel  - Prepare clean surface and Wash your hands with soap and water OK to shower; remove dressings prior to shower; reapply immediately after  - Wash with mild unscented soap (such as Baby shampoo) and water  - Apply small amount of VASHE on gauze into wound bed for 10 minutes, remove gauze pat dry  - Swab entire wound area with Betadine solution available OTC and cotton ball  - Cut and fluff 1/10th roll of AMD gauze to fill the wound   - Cover with 1 5x9 ABD gauze  - Secure with one roll of 4\" conforming gauze  - Tape with Medipore 2\" tape  - Pull up Spandgrip F from toes to ankle and Spandigrip G from ankle to below knee for edema control  - OR Velcro compression sleeves for calf and foot, instead of spandigrip  Change daily and as needed for soilage/saturation  Wear Prevalon boots to offload your feet when your are in bed or sitting and float the heels using pillows so nothing is touching the heels. We will send an order to your CM to pursue Prevalon boots thru DME supplier.     Compression:   Spandigrip F to feet, G to calf  OR Velcro compression wraps arrive as ordered) and can be removed at night and put back on first thing in the morning. Please remove compression dressing if toes turn blue and/or tingle and can not be relieved by raising the leg for one hour. If unable to reapply in the morning, keep compression on until next dressing change.  Whenever you sit raise your ankle above your hips to promote wound " healing.     Protein:  A diet high in protein is important for wound healing, we recommend getting 90 grams of protein per day.   Taking protein shakes or bars are a good way to get extra protein in your diet. Pravin supplement 1-2 times a day.      Main Provider: Mone Koehler D.P.M. June 24, 2024    Call us at [phone] if you have any questions about your wounds, if you have redness or swelling around your wound, have a fever of 101 degrees Fahrenheit or greater or if you have any other problems or concerns. We answer the phone Monday through Friday 8 am to 4 pm, please leave a message as we check the voicemail frequently throughout the day. If you have a concern over the weekend, please leave a message and we will return your call Monday. If the need is urgent, go to the ER or urgent care.    If you had a positive experience please indicate that on your patient satisfaction survey form that [facility] will be sending you.    It was a pleasure meeting with you today.  Thank you for allowing me and my team the privilege of caring for you today.  YOU are the reason we are here, and I truly hope we provided you with the excellent service you deserve.  Please let us know if there is anything else we can do for you so that we can be sure you are leaving completely satisfied with your care experience.      If you have any billing related questions please call the [facility] Business office at [phone]. The clinic staff does not handle billing related matters.    If you are scheduled to have a follow up appointment, you will receive a reminder call the day before your visit. On the appointment day please arrive 15 minutes prior to your appointment time. If you are unable to keep that appointment, please call the clinic to cancel or reschedule. If you are more than 10 minutes late or greater for your scheduled appointment time, the clinic policy is that you may be asked to reschedule.

## 2024-07-02 ENCOUNTER — HOSPITAL ENCOUNTER (OUTPATIENT)
Dept: MRI IMAGING | Facility: CLINIC | Age: 39
Discharge: HOME | End: 2024-07-02
Attending: PHYSICIAN ASSISTANT | Admitting: PHYSICIAN ASSISTANT
Payer: COMMERCIAL

## 2024-07-02 DIAGNOSIS — L97.422 SKIN ULCER OF LEFT HEEL WITH FAT LAYER EXPOSED (H): ICD-10-CM

## 2024-07-02 PROCEDURE — 73721 MRI JNT OF LWR EXTRE W/O DYE: CPT | Mod: 52,LT

## 2024-07-02 PROCEDURE — 73721 MRI JNT OF LWR EXTRE W/O DYE: CPT | Mod: 26 | Performed by: RADIOLOGY

## 2024-07-15 ENCOUNTER — TELEPHONE (OUTPATIENT)
Dept: WOUND CARE | Facility: CLINIC | Age: 39
End: 2024-07-15

## 2024-07-15 ENCOUNTER — HOSPITAL ENCOUNTER (OUTPATIENT)
Dept: WOUND CARE | Facility: CLINIC | Age: 39
Discharge: HOME | End: 2024-07-15
Attending: PODIATRIST | Admitting: PODIATRIST
Payer: COMMERCIAL

## 2024-07-15 VITALS — TEMPERATURE: 97.9 F

## 2024-07-15 DIAGNOSIS — L89.623 PRESSURE INJURY OF LEFT HEEL, STAGE 3 (H): Primary | Chronic | ICD-10-CM

## 2024-07-15 PROCEDURE — 11042 DBRDMT SUBQ TIS 1ST 20SQCM/<: CPT | Performed by: PODIATRIST

## 2024-07-15 PROCEDURE — 99213 OFFICE O/P EST LOW 20 MIN: CPT | Mod: 25 | Performed by: PODIATRIST

## 2024-07-15 NOTE — PROGRESS NOTES
[facility] Progress Note    Subject: Patient was seen at wound center by myself for the first time, for his left heel. States ulcer has been present for approx 2 years. Per chart review, patient he underwent operative excision with bone cultures in march of 2022. He was noted to have osteomyelitis a that time and was treated with prolonged oral antibiotic. In sept he also had an infection with signs of osteomyelitis. A BKA was suggested, but was declined by patient and his legal guardian.   -Currently, an MRI has been recommended and ordered. It hasn't been done yet. He lives in a group home and has nurses that assist with his wound care. He uses a motorized chair for mobility. He does use his left heel for transfers.     6/24: Follows up for left foot. MRI is now scheduled for 7/2. Hasn't been completed. States things are going well with dressing changes, but states he doesn't want any debridement done. Also hasn't gotten prevalon boots, but order was placed.   7/15: Follows up for left foot. Denies pain to site. Conversation had regarding pressure, states he has the prevalon boot, but hasn't been using it. States he only puts weight on his foot for transfers. Primarily uses his wheelchair.     PMH:   Past Medical History:   Diagnosis Date    Adult failure to thrive 02/25/2022    COVID-19 10/19/2021    Elevated d-dimer 10/19/2021    Elevated troponin 10/19/2021    H/O febrile seizure 09/05/2013    Secondary to fever at 7 yo No subsequent seizures    History of DVT (deep vein thrombosis) 04/14/2017    History of DVT (deep vein thrombosis) December 2016, May 2017, September 2017.   Refused warfarin for treatment      History of pressure injury of skin 06/24/2018    History of pressure ulcer December 2016. Healed by April 2017.      Homeless 06/24/2018    Other acute pulmonary embolism without acute cor pulmonale (H) 10/19/2021    Pressure injury of right buttock, stage 3 (H) 07/11/2018     Sepsis (H) 07/10/2022       Social Hx:   Social History     Socioeconomic History    Marital status: Patient Declined     Spouse name: Not on file    Number of children: Not on file    Years of education: Not on file    Highest education level: Not on file   Occupational History    Not on file   Tobacco Use    Smoking status: Unknown    Smokeless tobacco: Not on file   Substance and Sexual Activity    Alcohol use: Not on file    Drug use: Not on file    Sexual activity: Not on file   Other Topics Concern    Not on file   Social History Narrative    5/2024: Lives in a Group Home     Social Determinants of Health     Financial Resource Strain: Low Risk  (10/15/2021)    [facility]    Overall Financial Resource Strain (CARDIA)     Difficulty of Paying Living Expenses: Not hard at all   Food Insecurity: No Food Insecurity (10/15/2021)    [facility]    Hunger Vital Sign     Worried About Running Out of Food in the Last Year: Never true     Ran Out of Food in the Last Year: Never true   Transportation Needs: No Transportation Needs (10/15/2021)    Received [facility], [facility]    PRAPARE - Transportation     Lack of Transportation (Medical): No     Lack of Transportation (Non-Medical): No   Physical Activity: Not on file   Stress: Not on file   Social Connections: Unknown (2/6/2023)    Received from [facility], [facility]    Social Connections     Frequency of Communication with Friends and Family: Not on file   Interpersonal Safety: Not on file   Housing Stability: Not on file       Surgical Hx:   Past Surgical History:   Procedure Laterality Date    DEBRIDEMENT FOOT Left 03/24/2022     ([facility])    ELBOW DEBRIDEMENT Right 07/01/2020    ([facility])       Allergies:    Allergies   Allergen Reactions    Sulfa Antibiotics      Pt states cannot have sulfa because it reacts with his other medications       Medications:   Current Outpatient Medications   Medication Sig Dispense Refill    povidone-iodine (BETADINE) 10 % topical solution Apply topically daily Apply to left heel wound as directed 473 mL 11     No current facility-administered medications for this encounter.         Objective:  Vitals:  Temp 97.9  F (36.6  C) (Temporal)     A1C: None available      General:  Patient is alert and orientated.  NAD      Dermatologic: Left plantar heel ulcer: large and with depth to deep subcutaneous tissue. Gross swelling and edema to plantar heel. No purulence or gross signs of infection. Nontender. Similar in appearance        .   Wound (used by OP [facility] only) 10/06/23 0905 Left plantar heel (Active)   Thickness/Stage Stage 3 07/15/24 1305   Base granulating;slough 07/15/24 1305   Periwound edematous 07/15/24 1305   Periwound Temperature warm 07/15/24 1305   Periwound Skin Turgor firm 07/15/24 1305   Edges callused 07/15/24 1305   Length (cm) 3 07/15/24 1305   Width (cm) 3.6 07/15/24 1305   Depth (cm) 1.7 07/15/24 1305   Wound (cm^2) 10.8 cm^2 07/15/24 1305   Wound Volume (cm^3) 18.36 cm^3 07/15/24 1305   Wound healing % -44 07/15/24 1305   Drainage Characteristics/Odor serosanguineous 07/15/24 1305   Drainage Amount large 07/15/24 1305   Care, Wound debrided 07/15/24 1305          Vascular: DP & PT pulses are intact & regular bilaterally.  No significant edema or varicosities noted.  CFT and skin temperature is normal to both lower extremities.     Neurologic: Lower extremity sensation is absent.     Musculoskeletal: Patient is nonambulatory.      Imaging:    Right Digital brachial index: 120, index of 0.98  Left Digital Brachial index: 118, index of 0.97     Waveforms: Distal posterior tibial and dorsalis  pedis waveforms are  triphasic. Both digital waveforms intact.                                                                      IMPRESSION: Normal CASSANDRA examination     I personally reviewed the images and agree with the reports as stated.        Cultures:  None      Assessment:   Left heel ulcer --> unchanged   Spastic tetraplegia     Plan:    -Discussed all findings with patient. Chart and imaging reviewed.   -MRI with limited images and a lot of motion artifact, but without overt signs of osteomyelitis.   -Left heel ulcer overall unchanged. No clinical signs of infection. Depth though significant.   -Discussed with patient need for more aggressive treatment as site hasn't really made any changes. Has prevalon boot now, but uncertain how much he's using it. Discussed need for this.   -Will also order wound vac to site to assist in closure. Will need three times a week dressing changes. Patient agreeable to this.     Procedure:  After verbal consent, per protocol lidocaine was applied to the wound. Excisional debridement was performed on ulcer.   #15 blade was used to debride ulcer down to and including subcutaneous tissue. Bleeding controlled with light pressure.  No drainage noted.   < 20sq cm debrided.  Dry dressing applied to foot.  Patient tolerated procedure well.     Mone Koehler DPM              Further instructions from your care team         07/15/2024   Jody Jenkins   1985    No DME supply order today placed. Utilize dressings until home care starts wound vac later this week is our goal.    Plan 07/15/2024   [facility]; Meg ANTOINE phone [phone] fax [phone]  Dressing changes outside of clinic are being performed by group home staff  Home care referral placed for a nurse to come to your home 3 times weekly for wound vac changes  Wound vac ordered through  to your home. Should be delivered later this week       WOUND CARE UNTIL WOUND VAC ARRIVES AND STARTED BY HOME  "CARE  Wound Dressing Change: Left plantar heel  - Prepare clean surface and Wash your hands with soap and water OK to shower; remove dressings prior to shower; reapply immediately after  - Wash with mild unscented soap (such as Baby shampoo) and water  - Apply small amount of VASHE on gauze into wound bed for 10 minutes, remove gauze pat dry  - Swab entire wound area with Betadine solution available OTC and cotton ball  - Cut and fluff 1/10th roll of AMD gauze to fill the wound   - Cover with 1 5x9 ABD gauze  - Secure with one roll of 4\" conforming gauze  - Tape with Medipore 2\" tape  - Pull up Spandgrip F from toes to ankle and Spandigrip G from ankle to below knee for edema control  - OR Velcro compression sleeves for calf and foot, instead of spandigrip  Change daily and as needed for soilage/saturation      WOUND CARE ONCE WOUND VAC ARRIVES AND HOME CARE STARTS:  Wound Dressing Change: Left plantar heel  Remove old dressing and ensure all black foam is removed  Cleanse wound with Vashe moist gauze, leave in place for 10 minutes; remove   Cleanse periwound skin with wound spray, pat dry and apply No Sting Barrier film.  Cut Black Foam to fill the depth of wound but please ensure it is not overstuffed.  Cover wound with VAC dressing tape to seal the foam, cut appropriate size opening for the TRAC pad.  Connect TRAC pad and connect to pump; NPWT -125 mmHg Continuous, ensure no leaks  Change canister when alarms full or weekly  Change dressing three times a week  Document on the outside of the dressing the number of sponges used and the date of the dressing  If dressing is compromised for greater than 2 hours then please remove entire dressing and change or tuck moist Vashe gauze into wound until new dressing can be applied.    May use ostomy paste for divots and crevices as needed to maintain seal.      Compression:   Spandigrip F to feet, G to calf  OR Velcro compression wraps arrive as ordered) and can be removed " at night and put back on first thing in the morning. Please remove compression dressing if toes turn blue and/or tingle and can not be relieved by raising the leg for one hour. If unable to reapply in the morning, keep compression on until next dressing change.  Whenever you sit raise your ankle above your hips to promote wound healing.     Protein:  A diet high in protein is important for wound healing, we recommend getting 90 grams of protein per day.   Taking protein shakes or bars are a good way to get extra protein in your diet. Pravin supplement 1-2 times a day.       Main Provider: Mone Koehler D.P.M. July 15, 2024    Call us at [phone] if you have any questions about your wounds, if you have redness or swelling around your wound, have a fever of 101 degrees Fahrenheit or greater or if you have any other problems or concerns. We answer the phone Monday through Friday 8 am to 4 pm, please leave a message as we check the voicemail frequently throughout the day. If you have a concern over the weekend, please leave a message and we will return your call Monday. If the need is urgent, go to the ER or urgent care.    If you had a positive experience please indicate that on your patient satisfaction survey form that [facility] will be sending you.    It was a pleasure meeting with you today.  Thank you for allowing me and my team the privilege of caring for you today.  YOU are the reason we are here, and I truly hope we provided you with the excellent service you deserve.  Please let us know if there is anything else we can do for you so that we can be sure you are leaving completely satisfied with your care experience.      If you have any billing related questions please call the [facility] Business office at [phone]. The clinic staff does not handle billing related matters.    If you are scheduled to have a follow up appointment, you will receive a reminder call the day before your visit. On the  appointment day please arrive 15 minutes prior to your appointment time. If you are unable to keep that appointment, please call the clinic to cancel or reschedule. If you are more than 10 minutes late or greater for your scheduled appointment time, the clinic policy is that you may be asked to reschedule.

## 2024-07-15 NOTE — ADDENDUM NOTE
Encounter addended by: Mone Koehler DPM on: 7/15/2024 3:54 PM   Actions taken: Order list changed, Diagnosis association updated

## 2024-07-15 NOTE — TELEPHONE ENCOUNTER
Home care referral faxed to the following agencies. Awaiting to see if any agency able to accept.    [facility] Phone: [phone] Fax: [phone]    [facility] office phone [phone] Fax [phone]    [facility] Phone: [phone]  [facility] F[facility] new referrals: [phone] Nurse line fax: [phone]    [facility] Phone: [phone] Fax: [phone]

## 2024-07-15 NOTE — DISCHARGE INSTRUCTIONS
"07/15/2024   Jody Jenkins   1985    No DME supply order today placed. Utilize dressings until home care starts wound vac later this week is our goal.    Plan 07/15/2024   [facility]; Meg ANTOINE phone [phone] fax [phone]  Dressing changes outside of clinic are being performed by group home staff  Home care referral placed for a nurse to come to your home 3 times weekly for wound vac changes  Wound vac ordered through  to your home. Should be delivered later this week       WOUND CARE UNTIL WOUND VAC ARRIVES AND STARTED BY HOME CARE  Wound Dressing Change: Left plantar heel  - Prepare clean surface and Wash your hands with soap and water OK to shower; remove dressings prior to shower; reapply immediately after  - Wash with mild unscented soap (such as Baby shampoo) and water  - Apply small amount of VASHE on gauze into wound bed for 10 minutes, remove gauze pat dry  - Swab entire wound area with Betadine solution available OTC and cotton ball  - Cut and fluff 1/10th roll of AMD gauze to fill the wound   - Cover with 1 5x9 ABD gauze  - Secure with one roll of 4\" conforming gauze  - Tape with Medipore 2\" tape  - Pull up Spandgrip F from toes to ankle and Spandigrip G from ankle to below knee for edema control  - OR Velcro compression sleeves for calf and foot, instead of spandigrip  Change daily and as needed for soilage/saturation      WOUND CARE ONCE WOUND VAC ARRIVES AND HOME CARE STARTS:  Wound Dressing Change: Left plantar heel  Remove old dressing and ensure all black foam is removed  Cleanse wound with Vashe moist gauze, leave in place for 10 minutes; remove   Cleanse periwound skin with wound spray, pat dry and apply No Sting Barrier film.  Cut Black Foam to fill the depth of wound but please ensure it is not overstuffed.  Cover wound with VAC dressing tape to seal the foam, cut appropriate size opening for the TRAC pad.  Connect TRAC pad and connect to pump; NPWT -125 mmHg " Continuous, ensure no leaks  Change canister when alarms full or weekly  Change dressing three times a week  Document on the outside of the dressing the number of sponges used and the date of the dressing  If dressing is compromised for greater than 2 hours then please remove entire dressing and change or tuck moist Vashe gauze into wound until new dressing can be applied.    May use ostomy paste for divots and crevices as needed to maintain seal.      Compression:   Spandigrip F to feet, G to calf  OR Velcro compression wraps arrive as ordered) and can be removed at night and put back on first thing in the morning. Please remove compression dressing if toes turn blue and/or tingle and can not be relieved by raising the leg for one hour. If unable to reapply in the morning, keep compression on until next dressing change.  Whenever you sit raise your ankle above your hips to promote wound healing.     Protein:  A diet high in protein is important for wound healing, we recommend getting 90 grams of protein per day.   Taking protein shakes or bars are a good way to get extra protein in your diet. Pravin supplement 1-2 times a day.       Main Provider: Mone Koehler D.P.M. July 15, 2024    Call us at [phone] if you have any questions about your wounds, if you have redness or swelling around your wound, have a fever of 101 degrees Fahrenheit or greater or if you have any other problems or concerns. We answer the phone Monday through Friday 8 am to 4 pm, please leave a message as we check the voicemail frequently throughout the day. If you have a concern over the weekend, please leave a message and we will return your call Monday. If the need is urgent, go to the ER or urgent care.    If you had a positive experience please indicate that on your patient satisfaction survey form that [facility] will be sending you.    It was a pleasure meeting with you today.  Thank you for allowing me and my team the  privilege of caring for you today.  YOU are the reason we are here, and I truly hope we provided you with the excellent service you deserve.  Please let us know if there is anything else we can do for you so that we can be sure you are leaving completely satisfied with your care experience.      If you have any billing related questions please call the [facility] Business office at [phone]. The clinic staff does not handle billing related matters.    If you are scheduled to have a follow up appointment, you will receive a reminder call the day before your visit. On the appointment day please arrive 15 minutes prior to your appointment time. If you are unable to keep that appointment, please call the clinic to cancel or reschedule. If you are more than 10 minutes late or greater for your scheduled appointment time, the clinic policy is that you may be asked to reschedule.

## 2024-07-16 NOTE — TELEPHONE ENCOUNTER
Home care referral sent to:      [facility] Phone: [phone] Fax [phone]     [facility] Phone [phone] Fax [phone]     [facility] (Phone: [phone] Fax: [phone])

## 2024-07-18 NOTE — TELEPHONE ENCOUNTER
Telephone call to group home and updated them with plan. At this time we do not have a home care agency that is able to start. Awaiting reply from two more companies and then may send to additional home care companies to check availability if needed.   Group home staff are watching for VAC that should be delivered tomorrow.

## 2024-07-18 NOTE — TELEPHONE ENCOUNTER
Home care referral sent to:     [facility] Phone [phone] Fax [phone]     [facility] Phone [phone] Fax [phone]     [facility] Phone [phone] Fax [phone]    [facility] Phone: [phone] Fax [phone]  Fax for new referrals: [phone]

## 2024-07-19 NOTE — TELEPHONE ENCOUNTER
Received call from [facility] that they are able to accept the referral and will start seeing the patient on Monday 7/22/24. Group home called to inform them of this.

## 2024-07-19 NOTE — TELEPHONE ENCOUNTER
Home care referral sent to:      [facility] phone [phone] fax [phone]    [facility] Phone [phone] Fax [phone]     [facility] Phone [phone] Fax [phone]    [facility] Phone:[phone] Fax: [phone]

## 2024-07-22 NOTE — TELEPHONE ENCOUNTER
[facility]ie Lima left a vm 7/19/24 (after clinic hours) stating that they need to push the admission date to Tuesday 7/23 or Wednesday 7/24 due to the patient's primary doctor being a podiatrist and it takes them additional time to get the necessary info into their system. If there are any concerns or issues with this, please call [phone]

## 2024-07-29 ENCOUNTER — TELEPHONE (OUTPATIENT)
Dept: WOUND CARE | Facility: CLINIC | Age: 39
End: 2024-07-29
Payer: COMMERCIAL

## 2024-07-29 NOTE — TELEPHONE ENCOUNTER
Spoke with Ana. She notified us that they did contact  and hoping to have a new cord out for the wound vac in the next few days to restart it. At this time they are going to apply silver alginate, ABD pad or superabsorbant for absorbtion followed by roll gauze and tape. She will notify us if new cord does not arrive this week.

## 2024-07-29 NOTE — TELEPHONE ENCOUNTER
[facility]    Who is the name of the provider?:  JACKY    What is the location you see this provider at/preferred location?: [facility]aling [facility]  Person calling / Facility: Ana Lima  Phone number:  [phone]   Nurse call back needed:  YES   Can we leave a detailed message on this number?  YES     Reason for call:  At today's home visit, unable to change wound vac because  cord is damaged, patient pulled it apart.   Had to do past wound care per patient's direction.  Clean with Vashe, allow to soak for 10 minutes.  Applied Aqua cell AG, abdominal pad and tape, which patient had on hand.  Requesting alternate wound care orders, in case unable to do wound vac care in the future.      Pharmacy location:  NA

## 2024-08-05 ENCOUNTER — TELEPHONE (OUTPATIENT)
Dept: WOUND CARE | Facility: CLINIC | Age: 39
End: 2024-08-05
Payer: COMMERCIAL

## 2024-08-05 NOTE — TELEPHONE ENCOUNTER
Returned call to Ana. Discussed that [employer] delivered 2 boxes on Friday 8/2/24 that should have included dressings and canisters. The patient told Ana he only received canisters. Call placed to the group home RN, Meg to discuss this. She is aware that 1 box was delivered on Friday but didn't know a 2nd box was delivered. She will look around the house and in the patients room to see if it is found. Meg also given the [employer] tracking number for reference in case the box is not found.

## 2024-08-05 NOTE — TELEPHONE ENCOUNTER
Ana, LPN with [facility], called to update that the patient was seen today by home care but there were no wound vac supplies available. When she saw the patient last week there was enough for one more visit, so Ana reached out to her supervisor to order more. As of today 8/5/24, only canisters had been received. Per Ana, there have been other issues with the wound vac and was asked to update the [facility] if there were any further related issues.    Ana [phone]

## 2024-08-06 ENCOUNTER — MEDICAL CORRESPONDENCE (OUTPATIENT)
Dept: HEALTH INFORMATION MANAGEMENT | Facility: CLINIC | Age: 39
End: 2024-08-06
Payer: COMMERCIAL

## 2024-08-12 ENCOUNTER — MEDICAL CORRESPONDENCE (OUTPATIENT)
Dept: HEALTH INFORMATION MANAGEMENT | Facility: CLINIC | Age: 39
End: 2024-08-12
Payer: COMMERCIAL

## 2024-08-14 ENCOUNTER — TELEPHONE (OUTPATIENT)
Dept: WOUND CARE | Facility: CLINIC | Age: 39
End: 2024-08-14
Payer: COMMERCIAL

## 2024-08-14 NOTE — TELEPHONE ENCOUNTER
Ana returned call to clinic. Acknowledged the fall that might or might not have happened. Also discussed with Ana that [employer] can be called to see if the patient is able to get more drape since the dermatac kit only has 1 sheet of drape in it. [employer] called and the patient is able to get this. They will send a box of 10 that will arrive on Monday.

## 2024-08-14 NOTE — TELEPHONE ENCOUNTER
[facility] nurse, Ana, called to report a fall. She is aware that Dr Koehler is a podiatrist but was told Dr Koehler agreed to sign off on all of the patient's orders.    Ana reports that the patient told her he had a fall yesterday 8/13/24 when he was trying to transfer from his bed to his wheelchair. Patient claims that the staff was aware of the fall but when Ana contacted them to follow up, the staff claims they were not aware and that no fall had occurred. The patient did not sustain any injuries.    Ana is also requesting more Tagaderm as what they are being sent out if not enough to fully cover.     Ana [phone]   Statement Selected

## 2024-08-19 ENCOUNTER — HOSPITAL ENCOUNTER (OUTPATIENT)
Dept: WOUND CARE | Facility: CLINIC | Age: 39
Discharge: HOME | End: 2024-08-19
Attending: PODIATRIST | Admitting: PODIATRIST
Payer: COMMERCIAL

## 2024-08-19 VITALS — DIASTOLIC BLOOD PRESSURE: 85 MMHG | HEART RATE: 72 BPM | TEMPERATURE: 97.9 F | SYSTOLIC BLOOD PRESSURE: 132 MMHG

## 2024-08-19 DIAGNOSIS — L89.623 PRESSURE INJURY OF LEFT HEEL, STAGE 3 (H): Primary | Chronic | ICD-10-CM

## 2024-08-19 PROCEDURE — 11042 DBRDMT SUBQ TIS 1ST 20SQCM/<: CPT | Performed by: PODIATRIST

## 2024-08-19 PROCEDURE — 99213 OFFICE O/P EST LOW 20 MIN: CPT | Mod: 25 | Performed by: PODIATRIST

## 2024-08-19 NOTE — DISCHARGE INSTRUCTIONS
08/19/2024   Jody Jenkins   1985    A DME order was not completed because the patient declined the need for supplies    Dressing changes outside of clinic are being performed by Home Care    [facility] phone [phone] fax [phone]    Plan 08/19/2024   [facility]; Meg RN phone [phone] fax [phone]  Please have Janie call us with any questions about the wound vac    Today's temporary dressing is VASHE moistened gauze with an ABD and roll gauze.  Please restart NPWT tomorrow.     Wound Dressing Change: Left plantar heel  Remove old dressing and ensure all black foam is removed  Cleanse wound with Vashe moist gauze, leave in place for 10 minutes; remove   Cleanse periwound skin with wound spray, pat dry and apply No Sting Barrier film.  Cut Black Foam to fill the depth of wound but please ensure it is not overstuffed.  Cover wound with VAC dressing tape to seal the foam, cut appropriate size opening for the TRAC pad.  Connect TRAC pad and connect to pump; NPWT -125 mmHg Continuous, ensure no leaks  Change canister when alarms full or weekly  Change dressing three times a week  Document on the outside of the dressing the number of sponges used and the date of the dressing  If dressing is compromised for greater than 2 hours then please remove entire dressing and change or tuck moist Vashe gauze into wound until new dressing can be applied.     May use ostomy paste for divots and crevices as needed to maintain seal.      Compression:   Spandigrip F to feet, G to calf  OR Velcro compression wraps arrive as ordered) and can be removed at night and put back on first thing in the morning. Please remove compression dressing if toes turn blue and/or tingle and can not be relieved by raising the leg for one hour. If unable to reapply in the morning, keep compression on until next dressing change.  Whenever you sit raise your ankle above your hips to promote wound healing.     Protein:  A  diet high in protein is important for wound healing, we recommend getting 90 grams of protein per day.   Taking protein shakes or bars are a good way to get extra protein in your diet. Pravin supplement 1-2 times a day.        Main Provider: Mone Koehler D.P.M. August 19, 2024    Call us at [phone] if you have any questions about your wounds, if you have redness or swelling around your wound, have a fever of 101 degrees Fahrenheit or greater or if you have any other problems or concerns. We answer the phone Monday through Friday 8 am to 4 pm, please leave a message as we check the voicemail frequently throughout the day. If you have a concern over the weekend, please leave a message and we will return your call Monday. If the need is urgent, go to the ER or urgent care.    If you had a positive experience please indicate that on your patient satisfaction survey form that [facility] will be sending you.    It was a pleasure meeting with you today.  Thank you for allowing me and my team the privilege of caring for you today.  YOU are the reason we are here, and I truly hope we provided you with the excellent service you deserve.  Please let us know if there is anything else we can do for you so that we can be sure you are leaving completely satisfied with your care experience.      If you have any billing related questions please call the [facility] Business office at [phone]. The clinic staff does not handle billing related matters.    If you are scheduled to have a follow up appointment, you will receive a reminder call the day before your visit. On the appointment day please arrive 15 minutes prior to your appointment time. If you are unable to keep that appointment, please call the clinic to cancel or reschedule. If you are more than 10 minutes late or greater for your scheduled appointment time, the clinic policy is that you may be asked to reschedule.

## 2024-08-19 NOTE — PROGRESS NOTES
[facility] Wound Healing Hematite Progress Note    Subject: Patient was seen at wound center by myself for the first time, for his left heel. States ulcer has been present for approx 2 years. Per chart review, patient he underwent operative excision with bone cultures in march of 2022. He was noted to have osteomyelitis a that time and was treated with prolonged oral antibiotic. In sept he also had an infection with signs of osteomyelitis. A BKA was suggested, but was declined by patient and his legal guardian.   -Currently, an MRI has been recommended and ordered. It hasn't been done yet. He lives in a group home and has nurses that assist with his wound care. He uses a motorized chair for mobility. He does use his left heel for transfers.     6/24: Follows up for left foot. MRI is now scheduled for 7/2. Hasn't been completed. States things are going well with dressing changes, but states he doesn't want any debridement done. Also hasn't gotten prevalon boots, but order was placed.   7/15: Follows up for left foot. Denies pain to site. Conversation had regarding pressure, states he has the prevalon boot, but hasn't been using it. States he only puts weight on his foot for transfers. Primarily uses his wheelchair.   8/19: Follows up for left foot. Denies pain. States has had a wound vac applied and being changed three times a week. No known issues per patient. Also haven't received any questions from home health. Uses wheelchair for mobility.        PMH:   Past Medical History:   Diagnosis Date    Adult failure to thrive 02/25/2022    COVID-19 10/19/2021    Elevated d-dimer 10/19/2021    Elevated troponin 10/19/2021    H/O febrile seizure 09/05/2013    Secondary to fever at 7 yo No subsequent seizures    History of DVT (deep vein thrombosis) 04/14/2017    History of DVT (deep vein thrombosis) December 2016, May 2017, September 2017.   Refused warfarin for treatment      History of pressure injury of skin 06/24/2018     History of pressure ulcer December 2016. Healed by April 2017.      Homeless 06/24/2018    Other acute pulmonary embolism without acute cor pulmonale (H) 10/19/2021    Pressure injury of right buttock, stage 3 (H) 07/11/2018    Sepsis (H) 07/10/2022       Social Hx:   Social History     Socioeconomic History    Marital status: Patient Declined     Spouse name: Not on file    Number of children: Not on file    Years of education: Not on file    Highest education level: Not on file   Occupational History    Not on file   Tobacco Use    Smoking status: Unknown    Smokeless tobacco: Not on file   Substance and Sexual Activity    Alcohol use: Not on file    Drug use: Not on file    Sexual activity: Not on file   Other Topics Concern    Not on file   Social History Narrative    5/2024: Lives in a Group Home     Social Determinants of Health     Financial Resource Strain: Low Risk  (10/15/2021)    Received from [facility], [facility]    Overall Financial Resource Strain (CARDIA)     Difficulty of Paying Living Expenses: Not hard at all   Food Insecurity: No Food Insecurity (10/15/2021)    Received from [facility], [facility]    Hunger Vital Sign     Worried About Running Out of Food in the Last Year: Never true     Ran Out of Food in the Last Year: Never true   Transportation Needs: No Transportation Needs (10/15/2021)    Received from [facility], [facility]    PRAPARE - Transportation     Lack of Transportation (Medical): No     Lack of Transportation (Non-Medical): No   Physical Activity: Not on file   Stress: Not on file   Social Connections: Unknown (2/6/2023)    Received from [facility], [facility]    Social Connections     Frequency of  Communication with Friends and Family: Not on file   Interpersonal Safety: Not on file   Housing Stability: Not on file       Surgical Hx:   Past Surgical History:   Procedure Laterality Date    DEBRIDEMENT FOOT Left 03/24/2022    ()    ELBOW DEBRIDEMENT Right 07/01/2020    ()       Allergies:    Allergies   Allergen Reactions    Sulfa Antibiotics      Pt states cannot have sulfa because it reacts with his other medications       Medications:   Current Outpatient Medications   Medication Sig Dispense Refill    povidone-iodine (BETADINE) 10 % topical solution Apply topically daily Apply to left heel wound as directed 473 mL 11     No current facility-administered medications for this encounter.         Objective:  Vitals:  /85 (BP Location: Left arm, Patient Position: Sitting, Cuff Size: Adult Large)   Pulse 72   Temp 97.9  F (36.6  C) (Temporal)     A1C: None available      General:  Patient is alert and orientated.  NAD      Dermatologic: Left plantar heel ulcer: large and with depth to deep subcutaneous tissue. Gross swelling and edema to plantar heel. No purulence or gross signs of infection. Nontender. Similar in appearance  --> still somewhat similar. May be slightly less deep.     .   Wound (used by OP [facility] only) 10/06/23 0905 Left plantar heel (Active)   Thickness/Stage Stage 3 08/19/24 1212   Base granulating;slough 08/19/24 1212   Periwound edematous 08/19/24 1212   Periwound Temperature warm 08/19/24 1212   Periwound Skin Turgor firm 08/19/24 1212   Edges callused 08/19/24 1212   Length (cm) 3.3 08/19/24 1212   Width (cm) 3.5 08/19/24 1212   Depth (cm) 1.5 08/19/24 1212   Wound (cm^2) 11.55 cm^2 08/19/24 1212   Wound Volume (cm^3) 17.33 cm^3 08/19/24 1212   Wound healing % -54 08/19/24 1212   Drainage Characteristics/Odor serosanguineous 08/19/24 1212   Drainage Amount large 08/19/24 1212   Care, Wound debrided 07/15/24 1305          Vascular: DP & PT pulses are intact & regular  bilaterally.  No significant edema or varicosities noted.  CFT and skin temperature is normal to both lower extremities.     Neurologic: Lower extremity sensation is absent.     Musculoskeletal: Patient is nonambulatory.     Imaging:    Right Digital brachial index: 120, index of 0.98  Left Digital Brachial index: 118, index of 0.97     Waveforms: Distal posterior tibial and dorsalis pedis waveforms are  triphasic. Both digital waveforms intact.                                                                      IMPRESSION: Normal CASSANDRA examination     I personally reviewed the images and agree with the reports as stated.        Cultures:  None     Plan:    -Discussed all findings with patient. Chart and imaging reviewed.   -Per patient, wound vac has been being appropriately applied. Uncertain if this is true given the wound hasn't changed very much.   -Debrided site today. No signs of infection. Recommend continue the wound vac and monitor for improvement. No concerns or complaints per patient.   -Discussed continued offloading at all times.   -Follow up in 3-4 weeks      Procedure:  After verbal consent, per protocol lidocaine was applied to the wound. Excisional debridement was performed on ulcer.   #15 blade was used to debride ulcer down to and including subcutaneous tissue. Bleeding controlled with light pressure.  No drainage noted.   < 20sq cm debrided.  Dry dressing applied to foot.  Patient tolerated procedure well.           Mone Koehler DPM            Further instructions from your care team         08/19/2024   Jody Jenkins   1985    A DME order was not completed because the patient declined the need for supplies    Dressing changes outside of clinic are being performed by Home Care    [facility] phone [phone] fax [phone]    Plan 08/19/2024   [facility] [employer]; Meg ANTOINE phone [phone] fax [phone]  Wound vac ordered through  to your home. Should be delivered  "later this week        WOUND CARE UNTIL WOUND VAC ARRIVES AND STARTED BY HOME CARE  Wound Dressing Change: Left plantar heel  - Prepare clean surface and Wash your hands with soap and water OK to shower; remove dressings prior to shower; reapply immediately after  - Wash with mild unscented soap (such as Baby shampoo) and water  - Apply small amount of VASHE on gauze into wound bed for 10 minutes, remove gauze pat dry  - Swab entire wound area with Betadine solution available OTC and cotton ball  - Cut and fluff 1/10th roll of AMD gauze to fill the wound   - Cover with 1 5x9 ABD gauze  - Secure with one roll of 4\" conforming gauze  - Tape with Medipore 2\" tape  - Pull up Spandgrip F from toes to ankle and Spandigrip G from ankle to below knee for edema control  - OR Velcro compression sleeves for calf and foot, instead of spandigrip  Change daily and as needed for soilage/saturation        WOUND CARE ONCE WOUND VAC ARRIVES AND HOME CARE STARTS:  Wound Dressing Change: Left plantar heel  Remove old dressing and ensure all black foam is removed  Cleanse wound with Vashe moist gauze, leave in place for 10 minutes; remove   Cleanse periwound skin with wound spray, pat dry and apply No Sting Barrier film.  Cut Black Foam to fill the depth of wound but please ensure it is not overstuffed.  Cover wound with VAC dressing tape to seal the foam, cut appropriate size opening for the TRAC pad.  Connect TRAC pad and connect to pump; NPWT -125 mmHg Continuous, ensure no leaks  Change canister when alarms full or weekly  Change dressing three times a week  Document on the outside of the dressing the number of sponges used and the date of the dressing  If dressing is compromised for greater than 2 hours then please remove entire dressing and change or tuck moist Vashe gauze into wound until new dressing can be applied.     May use ostomy paste for divots and crevices as needed to maintain seal.      Compression:   Spandigrip F to " feet, G to calf  OR Velcro compression wraps arrive as ordered) and can be removed at night and put back on first thing in the morning. Please remove compression dressing if toes turn blue and/or tingle and can not be relieved by raising the leg for one hour. If unable to reapply in the morning, keep compression on until next dressing change.  Whenever you sit raise your ankle above your hips to promote wound healing.     Protein:  A diet high in protein is important for wound healing, we recommend getting 90 grams of protein per day.   Taking protein shakes or bars are a good way to get extra protein in your diet. Pravin supplement 1-2 times a day.        Main Provider: Mone Koehler D.P.M. August 19, 2024    Call us at [phone] if you have any questions about your wounds, if you have redness or swelling around your wound, have a fever of 101 degrees Fahrenheit or greater or if you have any other problems or concerns. We answer the phone Monday through Friday 8 am to 4 pm, please leave a message as we check the voicemail frequently throughout the day. If you have a concern over the weekend, please leave a message and we will return your call Monday. If the need is urgent, go to the ER or urgent care.    If you had a positive experience please indicate that on your patient satisfaction survey form that [facility] will be sending you.    It was a pleasure meeting with you today.  Thank you for allowing me and my team the privilege of caring for you today.  YOU are the reason we are here, and I truly hope we provided you with the excellent service you deserve.  Please let us know if there is anything else we can do for you so that we can be sure you are leaving completely satisfied with your care experience.      If you have any billing related questions please call the [facility] Business office at [phone]. The clinic staff does not handle billing related matters.    If you are scheduled to have a  follow up appointment, you will receive a reminder call the day before your visit. On the appointment day please arrive 15 minutes prior to your appointment time. If you are unable to keep that appointment, please call the clinic to cancel or reschedule. If you are more than 10 minutes late or greater for your scheduled appointment time, the clinic policy is that you may be asked to reschedule.

## 2024-09-02 ASSESSMENT — ENCOUNTER SYMPTOMS
POOR WOUND HEALING: 1
WEAKNESS: 1
CONSTITUTIONAL NEGATIVE: 1
PARESTHESIAS: 1
PERSISTENT INFECTIONS: 1
EYES NEGATIVE: 1
ENDOCRINE NEGATIVE: 1
HEMATOLOGIC/LYMPHATIC NEGATIVE: 1
RESPIRATORY NEGATIVE: 1

## 2024-09-09 ENCOUNTER — MEDICAL CORRESPONDENCE (OUTPATIENT)
Dept: HEALTH INFORMATION MANAGEMENT | Facility: CLINIC | Age: 39
End: 2024-09-09
Payer: COMMERCIAL

## 2024-09-16 ENCOUNTER — HOSPITAL ENCOUNTER (OUTPATIENT)
Dept: WOUND CARE | Facility: CLINIC | Age: 39
Discharge: HOME | End: 2024-09-16
Attending: PODIATRIST | Admitting: PODIATRIST
Payer: COMMERCIAL

## 2024-09-16 VITALS — TEMPERATURE: 97.9 F | HEART RATE: 90 BPM | SYSTOLIC BLOOD PRESSURE: 118 MMHG | DIASTOLIC BLOOD PRESSURE: 70 MMHG

## 2024-09-16 DIAGNOSIS — L89.623 PRESSURE INJURY OF LEFT HEEL, STAGE 3 (H): Primary | Chronic | ICD-10-CM

## 2024-09-16 PROCEDURE — 99213 OFFICE O/P EST LOW 20 MIN: CPT | Mod: 25 | Performed by: PODIATRIST

## 2024-09-16 PROCEDURE — 11042 DBRDMT SUBQ TIS 1ST 20SQCM/<: CPT | Performed by: PODIATRIST

## 2024-09-16 NOTE — DISCHARGE INSTRUCTIONS
09/16/2024   Jody Jenkins   1985    A DME order was not completed because the patient declined the need for supplies    Dressing changes outside of clinic are being performed by Home Care  [facility] phone [phone] fax [phone]    Plan 09/16/2024   [facility]; Meg RN phone [phone] fax [phone]  Please have Janie call us with any questions about the wound vac     Wound Dressing Change: Left plantar heel  Remove old dressing and ensure all black foam is removed  Cleanse wound with Vashe moist gauze, leave in place for 10 minutes; remove   Cleanse periwound skin with wound spray, pat dry and apply No Sting Barrier film.  Cut Black Foam to fill the depth of wound but please ensure it is not overstuffed.  Cover wound with VAC dressing tape to seal the foam, cut appropriate size opening for the TRAC pad.  Connect TRAC pad and connect to pump; NPWT -125 mmHg Continuous, ensure no leaks  Change canister when alarms full or weekly  Change dressing three times a week  Document on the outside of the dressing the number of sponges used and the date of the dressing  If dressing is compromised for greater than 2 hours then please remove entire dressing and change or tuck moist Vashe gauze into wound until new dressing can be applied.     May use ostomy paste for divots and crevices as needed to maintain seal.      Compression:   Spandigrip F to feet, G to calf  OR Velcro compression wraps arrive as ordered) and can be removed at night and put back on first thing in the morning. Please remove compression dressing if toes turn blue and/or tingle and can not be relieved by raising the leg for one hour. If unable to reapply in the morning, keep compression on until next dressing change.  Whenever you sit raise your ankle above your hips to promote wound healing.     Protein:  A diet high in protein is important for wound healing, we recommend getting 90 grams of protein per day.   Taking  protein shakes or bars are a good way to get extra protein in your diet. Pravin supplement 1-2 times a day.     Main Provider: Mone Koehler D.P.M. September 16, 2024    Call us at [phone] if you have any questions about your wounds, if you have redness or swelling around your wound, have a fever of 101 degrees Fahrenheit or greater or if you have any other problems or concerns. We answer the phone Monday through Friday 8 am to 4 pm, please leave a message as we check the voicemail frequently throughout the day. If you have a concern over the weekend, please leave a message and we will return your call Monday. If the need is urgent, go to the ER or urgent care.    If you had a positive experience please indicate that on your patient satisfaction survey form that [facility] will be sending you.    It was a pleasure meeting with you today.  Thank you for allowing me and my team the privilege of caring for you today.  YOU are the reason we are here, and I truly hope we provided you with the excellent service you deserve.  Please let us know if there is anything else we can do for you so that we can be sure you are leaving completely satisfied with your care experience.      If you have any billing related questions please call the [facility] Business office at [phone]. The clinic staff does not handle billing related matters.    If you are scheduled to have a follow up appointment, you will receive a reminder call the day before your visit. On the appointment day please arrive 15 minutes prior to your appointment time. If you are unable to keep that appointment, please call the clinic to cancel or reschedule. If you are more than 10 minutes late or greater for your scheduled appointment time, the clinic policy is that you may be asked to reschedule.

## 2024-09-16 NOTE — PROGRESS NOTES
[facility] Wound Healing [place] Progress Note    Subject: Patient was seen at wound center by myself for the first time, for his left heel. States ulcer has been present for approx 2 years. Per chart review, patient he underwent operative excision with bone cultures in march of 2022. He was noted to have osteomyelitis a that time and was treated with prolonged oral antibiotic. In sept he also had an infection with signs of osteomyelitis. A BKA was suggested, but was declined by patient and his legal guardian.   -Currently, an MRI has been recommended and ordered. It hasn't been done yet. He lives in a group home and has nurses that assist with his wound care. He uses a motorized chair for mobility. He does use his left heel for transfers.     6/24: Follows up for left foot. MRI is now scheduled for 7/2. Hasn't been completed. States things are going well with dressing changes, but states he doesn't want any debridement done. Also hasn't gotten prevalon boots, but order was placed.   7/15: Follows up for left foot. Denies pain to site. Conversation had regarding pressure, states he has the prevalon boot, but hasn't been using it. States he only puts weight on his foot for transfers. Primarily uses his wheelchair.   8/19: Follows up for left foot. Denies pain. States has had a wound vac applied and being changed three times a week. No known issues per patient. Also haven't received any questions from home health. Uses wheelchair for mobility.     9/16: Follows up for left foot. Minimally conversational today. No known issues with wound vac or changes. Per patient and aid, no concerns. Denies pain. Denies N/F/V/C/D.     PMH:   Past Medical History:   Diagnosis Date    Adult failure to thrive 02/25/2022    COVID-19 10/19/2021    Elevated d-dimer 10/19/2021    Elevated troponin 10/19/2021    H/O febrile seizure 09/05/2013    Secondary to fever at 5 yo No subsequent seizures    History of DVT (deep vein thrombosis)  04/14/2017    History of DVT (deep vein thrombosis) December 2016, May 2017, September 2017.   Refused warfarin for treatment      History of pressure injury of skin 06/24/2018    History of pressure ulcer December 2016. Healed by April 2017.      Homeless 06/24/2018    Other acute pulmonary embolism without acute cor pulmonale (H) 10/19/2021    Pressure injury of right buttock, stage 3 (H) 07/11/2018    Sepsis (H) 07/10/2022       Social Hx:   Social History     Socioeconomic History    Marital status: Patient Declined     Spouse name: Not on file    Number of children: Not on file    Years of education: Not on file    Highest education level: Not on file   Occupational History    Not on file   Tobacco Use    Smoking status: Unknown    Smokeless tobacco: Not on file   Substance and Sexual Activity    Alcohol use: Not on file    Drug use: Not on file    Sexual activity: Not on file   Other Topics Concern    Not on file   Social History Narrative    5/2024: Lives in a Group Home     Social Determinants of Health     Financial Resource Strain: Low Risk  (10/15/2021)    [place] [place]    Overall Financial Resource Strain (CARDIA)     Difficulty of Paying Living Expenses: Not hard at all   Food Insecurity: No Food Insecurity (10/15/2021)    Received from [facility], [facility]    Hunger Vital Sign     Worried About Running Out of Food in the Last Year: Never true     Ran Out of Food in the Last Year: Never true   Transportation Needs: No Transportation Needs (10/15/2021)    [place] [facility] [place] [place]    PRAPARE - Transportation     Lack of Transportation (Medical): No     Lack of Transportation (Non-Medical): No   Physical Activity: Not on file   Stress: Not on file   Social  Connections: Unknown (2/6/2023)    Received from [facility], [facility]    Social Connections     Frequency of Communication with Friends and Family: Not on file   Interpersonal Safety: Low Risk  (9/16/2024)    Interpersonal Safety     Do you feel physically and emotionally safe where you currently live?: Yes     Within the past 12 months, have you been hit, slapped, kicked or otherwise physically hurt by someone?: No     Within the past 12 months, have you been humiliated or emotionally abused in other ways by your partner or ex-partner?: No   Housing Stability: Not on file       Surgical Hx:   Past Surgical History:   Procedure Laterality Date    DEBRIDEMENT FOOT Left 03/24/2022    ([facility])    ELBOW DEBRIDEMENT Right 07/01/2020    ([facility])       Allergies:    Allergies   Allergen Reactions    Sulfa Antibiotics      Pt states cannot have sulfa because it reacts with his other medications       Medications:   Current Outpatient Medications   Medication Sig Dispense Refill    povidone-iodine (BETADINE) 10 % topical solution Apply topically daily Apply to left heel wound as directed 473 mL 11     No current facility-administered medications for this encounter.         Objective:  Vitals:  /70 (BP Location: Left arm, Patient Position: Sitting, Cuff Size: Adult Large)   Pulse 90   Temp 97.9  F (36.6  C) (Temporal)     A1C: None available      General:  Patient is alert and orientated.  NAD      Dermatologic: Left plantar heel ulcer: large and with depth to deep subcutaneous tissue. Gross swelling and edema to plantar heel. No purulence or gross signs of infection. Nontender. Wound bed overall appears improved some.     .   Wound (used by OP [facility] only) 10/06/23 0905 Left plantar heel (Active)   Thickness/Stage Stage 3 09/16/24 1316   Base granulating 09/16/24 1316   Periwound macerated;intact 09/16/24 1316   Periwound Temperature warm 09/16/24  1316   Periwound Skin Turgor firm 09/16/24 1316   Edges callused 09/16/24 1316   Length (cm) 3.6 09/16/24 1316   Width (cm) 3.6 09/16/24 1316   Depth (cm) 2 09/16/24 1316   Wound (cm^2) 12.96 cm^2 09/16/24 1316   Wound Volume (cm^3) 25.92 cm^3 09/16/24 1316   Wound healing % -72.8 09/16/24 1316   Drainage Characteristics/Odor serosanguineous 09/16/24 1316   Drainage Amount large 09/16/24 1316   Care, Wound debrided 09/16/24 1316       Vascular: DP & PT pulses are intact & regular bilaterally.  No significant edema or varicosities noted.  CFT and skin temperature is normal to both lower extremities.     Neurologic: Lower extremity sensation is absent.     Musculoskeletal: Patient is nonambulatory.      Imaging:    Right Digital brachial index: 120, index of 0.98  Left Digital Brachial index: 118, index of 0.97     Waveforms: Distal posterior tibial and dorsalis pedis waveforms are  triphasic. Both digital waveforms intact.                                                                      IMPRESSION: Normal CASSANDRA examination     I personally reviewed the images and agree with the reports as stated.    Cultures:  None     Assessment:   Left heel ulcer --> may be slightly improved   Spastic tetraplegia     Plan:    -Discussed all findings with patient. Chart and imaging reviewed.   -Wound bed appears to be slightly improved depth wise. Still remains large. Debrided site as below. Wound vac reapplied.   -Discussed continued offloading.   -Hopeful continued improvement with wound vac.   -Follow up in 3-4 weeks      Procedure:  After verbal consent, per protocol lidocaine was applied to the wound. Excisional debridement was performed on ulcer.   #15 blade was used to debride ulcer down to and including subcutaneous tissue. Bleeding controlled with light pressure.  No drainage noted.   < 20sq cm debrided.  Dry dressing applied to foot.  Patient tolerated procedure well.     Mone Koehler DPM              Further  instructions from your care team         09/16/2024   Jody Jenkins   1985    A DME order was not completed because the patient declined the need for supplies    Dressing changes outside of clinic are being performed by Home Care  [facility] phone [phone] fax [phone]    Plan 09/16/2024   [facility]; Meg RN phone [phone] fax [phone]  Please have Jan[facility] call us with any questions about the wound vac     Wound Dressing Change: Left plantar heel  Remove old dressing and ensure all black foam is removed  Cleanse wound with Vashe moist gauze, leave in place for 10 minutes; remove   Cleanse periwound skin with wound spray, pat dry and apply No Sting Barrier film.  Cut Black Foam to fill the depth of wound but please ensure it is not overstuffed.  Cover wound with VAC dressing tape to seal the foam, cut appropriate size opening for the TRAC pad.  Connect TRAC pad and connect to pump; NPWT -125 mmHg Continuous, ensure no leaks  Change canister when alarms full or weekly  Change dressing three times a week  Document on the outside of the dressing the number of sponges used and the date of the dressing  If dressing is compromised for greater than 2 hours then please remove entire dressing and change or tuck moist Vashe gauze into wound until new dressing can be applied.     May use ostomy paste for divots and crevices as needed to maintain seal.      Compression:   Spandigrip F to feet, G to calf  OR Velcro compression wraps arrive as ordered) and can be removed at night and put back on first thing in the morning. Please remove compression dressing if toes turn blue and/or tingle and can not be relieved by raising the leg for one hour. If unable to reapply in the morning, keep compression on until next dressing change.  Whenever you sit raise your ankle above your hips to promote wound healing.     Protein:  A diet high in protein is important for wound healing, we recommend getting  90 grams of protein per day.   Taking protein shakes or bars are a good way to get extra protein in your diet. Pravin supplement 1-2 times a day.     Main Provider: Mone Koehler D.P.M. September 16, 2024    Call us at [phone] if you have any questions about your wounds, if you have redness or swelling around your wound, have a fever of 101 degrees Fahrenheit or greater or if you have any other problems or concerns. We answer the phone Monday through Friday 8 am to 4 pm, please leave a message as we check the voicemail frequently throughout the day. If you have a concern over the weekend, please leave a message and we will return your call Monday. If the need is urgent, go to the ER or urgent care.    If you had a positive experience please indicate that on your patient satisfaction survey form that [facility] will be sending you.    It was a pleasure meeting with you today.  Thank you for allowing me and my team the privilege of caring for you today.  YOU are the reason we are here, and I truly hope we provided you with the excellent service you deserve.  Please let us know if there is anything else we can do for you so that we can be sure you are leaving completely satisfied with your care experience.      If you have any billing related questions please call the [facility] Business office at [phone]. The clinic staff does not handle billing related matters.    If you are scheduled to have a follow up appointment, you will receive a reminder call the day before your visit. On the appointment day please arrive 15 minutes prior to your appointment time. If you are unable to keep that appointment, please call the clinic to cancel or reschedule. If you are more than 10 minutes late or greater for your scheduled appointment time, the clinic policy is that you may be asked to reschedule.

## 2024-09-23 DIAGNOSIS — Z53.9 DIAGNOSIS NOT YET DEFINED: Primary | ICD-10-CM

## 2024-10-21 ENCOUNTER — HOSPITAL ENCOUNTER (OUTPATIENT)
Dept: WOUND CARE | Facility: CLINIC | Age: 39
Discharge: HOME | End: 2024-10-21
Attending: PODIATRIST | Admitting: PODIATRIST
Payer: COMMERCIAL

## 2024-10-21 VITALS — DIASTOLIC BLOOD PRESSURE: 59 MMHG | SYSTOLIC BLOOD PRESSURE: 99 MMHG | TEMPERATURE: 98.9 F | HEART RATE: 86 BPM

## 2024-10-21 DIAGNOSIS — L89.623 PRESSURE INJURY OF LEFT HEEL, STAGE 3 (H): Primary | Chronic | ICD-10-CM

## 2024-10-21 PROCEDURE — 11042 DBRDMT SUBQ TIS 1ST 20SQCM/<: CPT | Performed by: PODIATRIST

## 2024-10-21 PROCEDURE — 99213 OFFICE O/P EST LOW 20 MIN: CPT | Mod: 25 | Performed by: PODIATRIST

## 2024-10-21 NOTE — PROGRESS NOTES
[facility] Wound Healing [place] Progress Note    Subject: Patient was seen at wound center by myself for the first time, for his left heel. States ulcer has been present for approx 2 years. Per chart review, patient he underwent operative excision with bone cultures in march of 2022. He was noted to have osteomyelitis a that time and was treated with prolonged oral antibiotic. In sept he also had an infection with signs of osteomyelitis. A BKA was suggested, but was declined by patient and his legal guardian.   -Currently, an MRI has been recommended and ordered. It hasn't been done yet. He lives in a group home and has nurses that assist with his wound care. He uses a motorized chair for mobility. He does use his left heel for transfers.     6/24: Follows up for left foot. MRI is now scheduled for 7/2. Hasn't been completed. States things are going well with dressing changes, but states he doesn't want any debridement done. Also hasn't gotten prevalon boots, but order was placed.   7/15: Follows up for left foot. Denies pain to site. Conversation had regarding pressure, states he has the prevalon boot, but hasn't been using it. States he only puts weight on his foot for transfers. Primarily uses his wheelchair.   8/19: Follows up for left foot. Denies pain. States has had a wound vac applied and being changed three times a week. No known issues per patient. Also haven't received any questions from home health. Uses wheelchair for mobility.      9/16: Follows up for left foot. Minimally conversational today. No known issues with wound vac or changes. Per patient and aid, no concerns. Denies pain. Denies N/F/V/C/D.      10/21: Follows up for left foot. Denies pain to site. Wound vac has been on now for about 2 months. Per patient and aid, no known issues. Is being changed three times a week. States is offloading his plantar heel. Denies N/F/V/C/D.     PMH:   Past Medical History:   Diagnosis Date    Adult failure  to thrive 02/25/2022    COVID-19 10/19/2021    Elevated d-dimer 10/19/2021    Elevated troponin 10/19/2021    H/O febrile seizure 09/05/2013    Secondary to fever at 5 yo No subsequent seizures    History of DVT (deep vein thrombosis) 04/14/2017    History of DVT (deep vein thrombosis) December 2016, May 2017, September 2017.   Refused warfarin for treatment      History of pressure injury of skin 06/24/2018    History of pressure ulcer December 2016. Healed by April 2017.      Homeless 06/24/2018    Other acute pulmonary embolism without acute cor pulmonale (H) 10/19/2021    Pressure injury of right buttock, stage 3 (H) 07/11/2018    Sepsis (H) 07/10/2022       Social Hx:   Social History     Socioeconomic History    Marital status: Patient Declined     Spouse name: Not on file    Number of children: Not on file    Years of education: Not on file    Highest education level: Not on file   Occupational History    Not on file   Tobacco Use    Smoking status: Unknown    Smokeless tobacco: Not on file   Substance and Sexual Activity    Alcohol use: Not on file    Drug use: Not on file    Sexual activity: Not on file   Other Topics Concern    Not on file   Social History Narrative    5/2024: Lives in a Group Home     Social Determinants of Health     Financial Resource Strain: Low Risk  (10/15/2021)    [facility], [facility] and [facility]    Overall Financial Resource Strain (CARDIA)     Difficulty of Paying Living Expenses: Not hard at all   Food Insecurity: No Food Insecurity (10/15/2021)    [facility], [facility] and [facility]    Hunger Vital Sign     Worried About Running Out of Food in the Last Year: Never true     Ran Out of Food in the Last Year: Never true   Transportation Needs: No Transportation Needs (10/15/2021)    [facility], [facility]    PRAPARE - Transportation     Lack of Transportation (Medical): No     Lack of Transportation (Non-Medical): No   Physical Activity: Not on file   Stress: Not on file   Social Connections: Unknown (2/6/2023)    Received from [facility], [facility]    Social Connections     Frequency of Communication with Friends and Family: Not on file   Interpersonal Safety: Low Risk  (10/21/2024)    Interpersonal Safety     Do you feel physically and emotionally safe where you currently live?: Yes     Within the past 12 months, have you been hit, slapped, kicked or otherwise physically hurt by someone?: No     Within the past 12 months, have you been humiliated or emotionally abused in other ways by your partner or ex-partner?: No   Housing Stability: Not on file       Surgical Hx:   Past Surgical History:   Procedure Laterality Date    DEBRIDEMENT FOOT Left 03/24/2022    ([facility])    ELBOW DEBRIDEMENT Right 07/01/2020    ([facility])       Allergies:    Allergies   Allergen Reactions    Sulfa Antibiotics      Pt states cannot have sulfa because it reacts with his other medications       Medications:   Current Outpatient Medications   Medication Sig Dispense Refill    povidone-iodine (BETADINE) 10 % topical solution Apply topically daily Apply to left heel wound as directed 473 mL 11     No current facility-administered medications for this encounter.         Objective:  Vitals:  BP 99/59 (BP Location: Left arm, Patient Position: Sitting, Cuff Size: Adult Large)   Pulse 86   Temp 98.9  F (37.2  C) (Temporal)     A1C: None available      General:  Patient is alert and orientated.  NAD      Dermatologic: Left plantar heel ulcer: large and with depth to deep subcutaneous tissue. Wound bed more necrotic today and with nonviable tissue. Periwound macerated. Noted odor.   .   Wound (used by OP I only)  10/06/23 0905 Left plantar heel (Active)   Thickness/Stage Stage 3 10/21/24 1400   Base granulating 10/21/24 1400   Periwound macerated;intact 10/21/24 1400   Periwound Temperature warm 10/21/24 1400   Periwound Skin Turgor firm 10/21/24 1400   Edges callused 10/21/24 1400   Length (cm) 4 10/21/24 1400   Width (cm) 3.7 10/21/24 1400   Depth (cm) 2.2 10/21/24 1400   Wound (cm^2) 14.8 cm^2 10/21/24 1400   Wound Volume (cm^3) 32.56 cm^3 10/21/24 1400   Wound healing % -97.33 10/21/24 1400   Drainage Characteristics/Odor serosanguineous 10/21/24 1400   Drainage Amount large 10/21/24 1400   Care, Wound debrided 10/21/24 1400          Vascular: DP & PT pulses are intact & regular bilaterally.  No significant edema or varicosities noted.  CFT and skin temperature is normal to both lower extremities.     Neurologic: Lower extremity sensation is absent.     Musculoskeletal: Patient is nonambulatory.      Imaging:    Right Digital brachial index: 120, index of 0.98  Left Digital Brachial index: 118, index of 0.97     Waveforms: Distal posterior tibial and dorsalis pedis waveforms are  triphasic. Both digital waveforms intact.                                                                      IMPRESSION: Normal CASSANDRA examination     I personally reviewed the images and agree with the reports as stated.     Cultures:  None      Assessment:   Left heel ulcer --> worsened today   Spastic tetraplegia     Plan:    -Discussed all findings with patient. Chart and imaging reviewed.   -Wound bed with more nonviable tissue today. Periwound also macerated. Debrided as below as much as able.   -Will stop the wound vac. Progress hasn't been significant and wound bed is worse today. Discussed this with patient.   -Skin to heel and foot is at baseline erythema and edema.   -Will change to daily vasche dressing changes to see how it improves wound bed.   -Discussed importance of offloading.   -Follow up in 3-4 weeks     Procedure:  After  "verbal consent, per protocol lidocaine was applied to the wound. Excisional debridement was performed on ulcer.   #15 blade was used to debride ulcer down to and including subcutaneous tissue. Bleeding controlled with light pressure.  No drainage noted.   < 20sq cm debrided.  Dry dressing applied to foot.  Patient tolerated procedure well.    Mone Koehler DPM              Further instructions from your care team         10/21/2024   Jody Jenkins   1985    A DME order for supplies has been placed to [facility]. If there are any issues with your order including not receiving the order please call [facility] at [phone]. They can also provide a tracking number for you.    Dressing changes outside of clinic are being performed by Home Care  [facility] phone [phone] fax [phone]  [facility] access hospital; Meg RN phone [phone] fax [phone]    Plan 10/21/2024   -Discontinued NPWT    Wound Dressing Change: Left Plantar Heel  - Wash your hands with soap and water before you begin your dressing change and prepare a clean surface for dressings.  -Cleanse with mild unscented soap and water (such as Cetaphil, Cerave or Dove)   -Primary dressing: Lightly pack the wound with VASHE moistened roll gauze (1/30 of a 4\" kerlix roll)  -Secondary dressing: Cover with 1/2 of a 5x9 ABD pad  Secure with 1 roll of 4\" conforming roll gauze and medipore tape  Pull up Spandigrip size F from toes to ankle and size G from ankle to knee (or velcro compression wraps)  Change daily and as needed with soiling/saturation        Compression:   Spandigrip F to feet, G to calf  OR Velcro compression wraps arrive as ordered) and can be removed at night and put back on first thing in the morning. Please remove compression dressing if toes turn blue and/or tingle and can not be relieved by raising the leg for one hour. If unable to reapply in the morning, keep compression on until next dressing change.  Whenever you " sit raise your ankle above your hips to promote wound healing.     Protein:  A diet high in protein is important for wound healing, we recommend getting 90 grams of protein per day.   Taking protein shakes or bars are a good way to get extra protein in your diet. Pravin supplement 1-2 times a day.     Main Provider: Mone Koehler D.P.M. October 21, 2024    Call us at [phone] if you have any questions about your wounds, if you have redness or swelling around your wound, have a fever of 101 degrees Fahrenheit or greater or if you have any other problems or concerns. We answer the phone Monday through Friday 8 am to 4 pm, please leave a message as we check the voicemail frequently throughout the day. If you have a concern over the weekend, please leave a message and we will return your call Monday. If the need is urgent, go to the ER or urgent care.    If you had a positive experience please indicate that on your patient satisfaction survey form that [facility] will be sending you.    It was a pleasure meeting with you today.  Thank you for allowing me and my team the privilege of caring for you today.  YOU are the reason we are here, and I truly hope we provided you with the excellent service you deserve.  Please let us know if there is anything else we can do for you so that we can be sure you are leaving completely satisfied with your care experience.      If you have any billing related questions please call the [facility] Business office at [phone]. The clinic staff does not handle billing related matters.    If you are scheduled to have a follow up appointment, you will receive a reminder call the day before your visit. On the appointment day please arrive 15 minutes prior to your appointment time. If you are unable to keep that appointment, please call the clinic to cancel or reschedule. If you are more than 10 minutes late or greater for your scheduled appointment time, the clinic policy is  that you may be asked to reschedule.

## 2024-10-21 NOTE — DISCHARGE INSTRUCTIONS
"10/21/2024   Jody Jenkins   1985    A DME order for supplies has been placed to [facility]. If there are any issues with your order including not receiving the order please call [facility] at [phone]. They can also provide a tracking number for you.    Dressing changes outside of clinic are being performed by Home Care  [facility] phone [phone] fax [phone]  [facility]; Meg RN phone [phone] fax [phone]    Plan 10/21/2024   -Discontinued NPWT    Wound Dressing Change: Left Plantar Heel  - Wash your hands with soap and water before you begin your dressing change and prepare a clean surface for dressings.  -Cleanse with mild unscented soap and water (such as Cetaphil, Cerave or Dove)   -Apply a thin layer of zinc oxide paste (such as Desitin) to the macerated periwound skin  -Primary dressing: Lightly pack the wound with VASHE moistened roll gauze (1/30 of a 4\" kerlix roll)  -Secondary dressing: Cover with 1/2 of a 5x9 ABD pad  Secure with 1 roll of 4\" conforming roll gauze and medipore tape  Pull up Spandigrip size F from toes to ankle and size G from ankle to knee (or velcro compression wraps)  Change daily and as needed with soiling/saturation        Compression:   Spandigrip F to feet, G to calf  OR Velcro compression wraps arrive as ordered) and can be removed at night and put back on first thing in the morning. Please remove compression dressing if toes turn blue and/or tingle and can not be relieved by raising the leg for one hour. If unable to reapply in the morning, keep compression on until next dressing change.  Whenever you sit raise your ankle above your hips to promote wound healing.     Protein:  A diet high in protein is important for wound healing, we recommend getting 90 grams of protein per day.   Taking protein shakes or bars are a good way to get extra protein in your diet. Pravin supplement 1-2 times a day.     Main Provider: Mone Koehler, " D.P.M. October 21, 2024    Call us at [phone] if you have any questions about your wounds, if you have redness or swelling around your wound, have a fever of 101 degrees Fahrenheit or greater or if you have any other problems or concerns. We answer the phone Monday through Friday 8 am to 4 pm, please leave a message as we check the voicemail frequently throughout the day. If you have a concern over the weekend, please leave a message and we will return your call Monday. If the need is urgent, go to the ER or urgent care.    If you had a positive experience please indicate that on your patient satisfaction survey form that [facility] will be sending you.    It was a pleasure meeting with you today.  Thank you for allowing me and my team the privilege of caring for you today.  YOU are the reason we are here, and I truly hope we provided you with the excellent service you deserve.  Please let us know if there is anything else we can do for you so that we can be sure you are leaving completely satisfied with your care experience.      If you have any billing related questions please call the [facility] Business office at [phone]. The clinic staff does not handle billing related matters.    If you are scheduled to have a follow up appointment, you will receive a reminder call the day before your visit. On the appointment day please arrive 15 minutes prior to your appointment time. If you are unable to keep that appointment, please call the clinic to cancel or reschedule. If you are more than 10 minutes late or greater for your scheduled appointment time, the clinic policy is that you may be asked to reschedule.

## 2024-10-22 ENCOUNTER — MEDICAL CORRESPONDENCE (OUTPATIENT)
Dept: HEALTH INFORMATION MANAGEMENT | Facility: CLINIC | Age: 39
End: 2024-10-22
Payer: COMMERCIAL

## 2024-10-22 ENCOUNTER — TELEPHONE (OUTPATIENT)
Dept: WOUND CARE | Facility: CLINIC | Age: 39
End: 2024-10-22
Payer: COMMERCIAL

## 2024-10-22 NOTE — ADDENDUM NOTE
Encounter addended by: Saumya Garcia RN on: 10/22/2024 7:52 AM   Actions taken: Edited Discharge Instructions

## 2024-10-22 NOTE — TELEPHONE ENCOUNTER
Viviana with [facility] is asking for another, [place] approved provider to sign the recently sent order as Dr Koehler is not showing up in the medical assistance database as an approved provider. Without that, the order can not be fulfilled.     Phone: [phone]  Fax [phone]

## 2024-10-23 ENCOUNTER — TELEPHONE (OUTPATIENT)
Dept: WOUND CARE | Facility: CLINIC | Age: 39
End: 2024-10-23
Payer: COMMERCIAL

## 2024-10-23 NOTE — TELEPHONE ENCOUNTER
Felicia with [facility] called to update that the patient has requested to be discharged from their services since the wound vac has been discontinued. They will be completing the discharge and wanted to simply update Dr Koehler.    If needed, they can be reached at [phone] but no call back was requested

## 2024-10-23 NOTE — TELEPHONE ENCOUNTER
Return call to discuss discharge of Home care and someone answered misrepresenting themselves stating they were Dat but clearly was a female voice. She stated that the Group Home staff were contacting  the Patient's legal Guardian to discuss continuing Home care visits.   Return call to [facility] and [facility] regarding the information received today.

## 2024-11-04 ENCOUNTER — MEDICAL CORRESPONDENCE (OUTPATIENT)
Dept: HEALTH INFORMATION MANAGEMENT | Facility: CLINIC | Age: 39
End: 2024-11-04
Payer: COMMERCIAL

## 2024-11-13 ENCOUNTER — TELEPHONE (OUTPATIENT)
Dept: WOUND CARE | Facility: CLINIC | Age: 39
End: 2024-11-13
Payer: COMMERCIAL

## 2024-11-13 NOTE — TELEPHONE ENCOUNTER
Spoke with Raysa isbell [facility] who is concerned about infected wound to left plantar heel.  Wound with increased malodorous drainage, redness, and pitting edema to left leg. Also stating patient is lethargic. Advised that patient go to emergency department.

## 2024-11-14 ENCOUNTER — APPOINTMENT (OUTPATIENT)
Dept: ULTRASOUND IMAGING | Facility: CLINIC | Age: 39
End: 2024-11-14
Attending: EMERGENCY MEDICINE
Payer: COMMERCIAL

## 2024-11-14 ENCOUNTER — HOSPITAL ENCOUNTER (OUTPATIENT)
Facility: CLINIC | Age: 39
Setting detail: OBSERVATION
Discharge: NURSING, REHAB OR LONG-TERM CARE | End: 2024-11-16
Attending: EMERGENCY MEDICINE | Admitting: EMERGENCY MEDICINE
Payer: COMMERCIAL

## 2024-11-14 ENCOUNTER — APPOINTMENT (OUTPATIENT)
Dept: GENERAL RADIOLOGY | Facility: CLINIC | Age: 39
End: 2024-11-14
Attending: EMERGENCY MEDICINE
Payer: COMMERCIAL

## 2024-11-14 DIAGNOSIS — L97.429 HEEL ULCERATION, LEFT, WITH UNSPECIFIED SEVERITY (H): ICD-10-CM

## 2024-11-14 DIAGNOSIS — G82.20 PARAPLEGIA (H): ICD-10-CM

## 2024-11-14 DIAGNOSIS — L03.116 CELLULITIS OF LEFT LOWER EXTREMITY: ICD-10-CM

## 2024-11-14 LAB
ALBUMIN SERPL BCG-MCNC: 3.5 G/DL (ref 3.5–5.2)
ALP SERPL-CCNC: 51 U/L (ref 40–150)
ALT SERPL W P-5'-P-CCNC: 17 U/L (ref 0–70)
ANION GAP SERPL CALCULATED.3IONS-SCNC: 10 MMOL/L (ref 7–15)
AST SERPL W P-5'-P-CCNC: 33 U/L (ref 0–45)
BASE EXCESS BLDV CALC-SCNC: 2 MMOL/L (ref -3–3)
BASOPHILS # BLD AUTO: 0.1 10E3/UL (ref 0–0.2)
BASOPHILS NFR BLD AUTO: 1 %
BILIRUB SERPL-MCNC: 0.2 MG/DL
BUN SERPL-MCNC: 10 MG/DL (ref 6–20)
CALCIUM SERPL-MCNC: 8.7 MG/DL (ref 8.8–10.4)
CHLORIDE SERPL-SCNC: 105 MMOL/L (ref 98–107)
CREAT SERPL-MCNC: 0.79 MG/DL (ref 0.67–1.17)
CRP SERPL-MCNC: 20.32 MG/L
EGFRCR SERPLBLD CKD-EPI 2021: >90 ML/MIN/1.73M2
EOSINOPHIL # BLD AUTO: 0.1 10E3/UL (ref 0–0.7)
EOSINOPHIL NFR BLD AUTO: 1 %
ERYTHROCYTE [DISTWIDTH] IN BLOOD BY AUTOMATED COUNT: 13.5 % (ref 10–15)
ERYTHROCYTE [SEDIMENTATION RATE] IN BLOOD BY WESTERGREN METHOD: 28 MM/HR (ref 0–15)
GLUCOSE SERPL-MCNC: 141 MG/DL (ref 70–99)
HCO3 BLDV-SCNC: 28 MMOL/L (ref 21–28)
HCO3 SERPL-SCNC: 24 MMOL/L (ref 22–29)
HCT VFR BLD AUTO: 37 % (ref 40–53)
HGB BLD-MCNC: 12.4 G/DL (ref 13.3–17.7)
IMM GRANULOCYTES # BLD: 0.1 10E3/UL
IMM GRANULOCYTES NFR BLD: 1 %
INR PPP: 1.14 (ref 0.85–1.15)
LACTATE BLD-SCNC: 1.7 MMOL/L
LYMPHOCYTES # BLD AUTO: 2.8 10E3/UL (ref 0.8–5.3)
LYMPHOCYTES NFR BLD AUTO: 32 %
MCH RBC QN AUTO: 28.7 PG (ref 26.5–33)
MCHC RBC AUTO-ENTMCNC: 33.5 G/DL (ref 31.5–36.5)
MCV RBC AUTO: 86 FL (ref 78–100)
MONOCYTES # BLD AUTO: 0.7 10E3/UL (ref 0–1.3)
MONOCYTES NFR BLD AUTO: 8 %
MRSA DNA SPEC QL NAA+PROBE: NEGATIVE
NEUTROPHILS # BLD AUTO: 5.1 10E3/UL (ref 1.6–8.3)
NEUTROPHILS NFR BLD AUTO: 58 %
NRBC # BLD AUTO: 0 10E3/UL
NRBC BLD AUTO-RTO: 0 /100
PCO2 BLDV: 45 MM HG (ref 40–50)
PH BLDV: 7.39 [PH] (ref 7.32–7.43)
PLATELET # BLD AUTO: 341 10E3/UL (ref 150–450)
PO2 BLDV: 59 MM HG (ref 25–47)
POTASSIUM SERPL-SCNC: 4.7 MMOL/L (ref 3.4–5.3)
PROT SERPL-MCNC: 7.2 G/DL (ref 6.4–8.3)
RBC # BLD AUTO: 4.32 10E6/UL (ref 4.4–5.9)
SA TARGET DNA: NEGATIVE
SAO2 % BLDV: 90 % (ref 70–75)
SODIUM SERPL-SCNC: 139 MMOL/L (ref 135–145)
WBC # BLD AUTO: 8.7 10E3/UL (ref 4–11)

## 2024-11-14 PROCEDURE — 80051 ELECTROLYTE PANEL: CPT | Performed by: EMERGENCY MEDICINE

## 2024-11-14 PROCEDURE — 250N000011 HC RX IP 250 OP 636: Performed by: PHYSICIAN ASSISTANT

## 2024-11-14 PROCEDURE — 83605 ASSAY OF LACTIC ACID: CPT

## 2024-11-14 PROCEDURE — 85025 COMPLETE CBC W/AUTO DIFF WBC: CPT | Performed by: EMERGENCY MEDICINE

## 2024-11-14 PROCEDURE — G0378 HOSPITAL OBSERVATION PER HR: HCPCS

## 2024-11-14 PROCEDURE — 96366 THER/PROPH/DIAG IV INF ADDON: CPT

## 2024-11-14 PROCEDURE — 258N000003 HC RX IP 258 OP 636: Performed by: EMERGENCY MEDICINE

## 2024-11-14 PROCEDURE — 99222 1ST HOSP IP/OBS MODERATE 55: CPT | Performed by: PHYSICIAN ASSISTANT

## 2024-11-14 PROCEDURE — 36415 COLL VENOUS BLD VENIPUNCTURE: CPT | Performed by: EMERGENCY MEDICINE

## 2024-11-14 PROCEDURE — 73630 X-RAY EXAM OF FOOT: CPT | Mod: LT

## 2024-11-14 PROCEDURE — 87641 MR-STAPH DNA AMP PROBE: CPT | Performed by: PHYSICIAN ASSISTANT

## 2024-11-14 PROCEDURE — 85610 PROTHROMBIN TIME: CPT | Performed by: EMERGENCY MEDICINE

## 2024-11-14 PROCEDURE — 96368 THER/DIAG CONCURRENT INF: CPT

## 2024-11-14 PROCEDURE — 96365 THER/PROPH/DIAG IV INF INIT: CPT

## 2024-11-14 PROCEDURE — 87040 BLOOD CULTURE FOR BACTERIA: CPT | Performed by: EMERGENCY MEDICINE

## 2024-11-14 PROCEDURE — 85652 RBC SED RATE AUTOMATED: CPT | Performed by: EMERGENCY MEDICINE

## 2024-11-14 PROCEDURE — 82803 BLOOD GASES ANY COMBINATION: CPT

## 2024-11-14 PROCEDURE — 85014 HEMATOCRIT: CPT | Performed by: EMERGENCY MEDICINE

## 2024-11-14 PROCEDURE — 96372 THER/PROPH/DIAG INJ SC/IM: CPT | Mod: 59 | Performed by: PHYSICIAN ASSISTANT

## 2024-11-14 PROCEDURE — 87640 STAPH A DNA AMP PROBE: CPT | Performed by: PHYSICIAN ASSISTANT

## 2024-11-14 PROCEDURE — 99285 EMERGENCY DEPT VISIT HI MDM: CPT | Mod: 25

## 2024-11-14 PROCEDURE — 93971 EXTREMITY STUDY: CPT | Mod: LT

## 2024-11-14 PROCEDURE — 86140 C-REACTIVE PROTEIN: CPT | Performed by: EMERGENCY MEDICINE

## 2024-11-14 PROCEDURE — 82040 ASSAY OF SERUM ALBUMIN: CPT | Performed by: EMERGENCY MEDICINE

## 2024-11-14 PROCEDURE — 250N000011 HC RX IP 250 OP 636: Performed by: EMERGENCY MEDICINE

## 2024-11-14 RX ORDER — PROCHLORPERAZINE MALEATE 10 MG
10 TABLET ORAL EVERY 6 HOURS PRN
Status: DISCONTINUED | OUTPATIENT
Start: 2024-11-14 | End: 2024-11-16 | Stop reason: HOSPADM

## 2024-11-14 RX ORDER — HYDRALAZINE HYDROCHLORIDE 10 MG/1
10 TABLET, FILM COATED ORAL EVERY 4 HOURS PRN
Status: DISCONTINUED | OUTPATIENT
Start: 2024-11-14 | End: 2024-11-16 | Stop reason: HOSPADM

## 2024-11-14 RX ORDER — PIPERACILLIN SODIUM, TAZOBACTAM SODIUM 4; .5 G/20ML; G/20ML
4.5 INJECTION, POWDER, LYOPHILIZED, FOR SOLUTION INTRAVENOUS ONCE
Status: COMPLETED | OUTPATIENT
Start: 2024-11-14 | End: 2024-11-14

## 2024-11-14 RX ORDER — AMOXICILLIN 250 MG
1 CAPSULE ORAL 2 TIMES DAILY PRN
Status: DISCONTINUED | OUTPATIENT
Start: 2024-11-14 | End: 2024-11-16 | Stop reason: HOSPADM

## 2024-11-14 RX ORDER — ONDANSETRON 2 MG/ML
4 INJECTION INTRAMUSCULAR; INTRAVENOUS EVERY 6 HOURS PRN
Status: DISCONTINUED | OUTPATIENT
Start: 2024-11-14 | End: 2024-11-16 | Stop reason: HOSPADM

## 2024-11-14 RX ORDER — ONDANSETRON 4 MG/1
4 TABLET, ORALLY DISINTEGRATING ORAL EVERY 6 HOURS PRN
Status: DISCONTINUED | OUTPATIENT
Start: 2024-11-14 | End: 2024-11-16 | Stop reason: HOSPADM

## 2024-11-14 RX ORDER — AMOXICILLIN 250 MG
2 CAPSULE ORAL 2 TIMES DAILY PRN
Status: DISCONTINUED | OUTPATIENT
Start: 2024-11-14 | End: 2024-11-16 | Stop reason: HOSPADM

## 2024-11-14 RX ORDER — ACETAMINOPHEN 650 MG/1
650 SUPPOSITORY RECTAL EVERY 4 HOURS PRN
Status: DISCONTINUED | OUTPATIENT
Start: 2024-11-14 | End: 2024-11-16 | Stop reason: HOSPADM

## 2024-11-14 RX ORDER — HYDRALAZINE HYDROCHLORIDE 20 MG/ML
10 INJECTION INTRAMUSCULAR; INTRAVENOUS EVERY 4 HOURS PRN
Status: DISCONTINUED | OUTPATIENT
Start: 2024-11-14 | End: 2024-11-16 | Stop reason: HOSPADM

## 2024-11-14 RX ORDER — ACETAMINOPHEN 325 MG/1
650 TABLET ORAL EVERY 4 HOURS PRN
Status: DISCONTINUED | OUTPATIENT
Start: 2024-11-14 | End: 2024-11-16 | Stop reason: HOSPADM

## 2024-11-14 RX ORDER — ACETAMINOPHEN 500 MG
500 TABLET ORAL EVERY 6 HOURS PRN
COMMUNITY

## 2024-11-14 RX ORDER — ENOXAPARIN SODIUM 100 MG/ML
40 INJECTION SUBCUTANEOUS EVERY 12 HOURS
Status: DISCONTINUED | OUTPATIENT
Start: 2024-11-14 | End: 2024-11-16 | Stop reason: HOSPADM

## 2024-11-14 RX ADMIN — VANCOMYCIN HYDROCHLORIDE 2500 MG: 10 INJECTION, POWDER, LYOPHILIZED, FOR SOLUTION INTRAVENOUS at 13:49

## 2024-11-14 RX ADMIN — ENOXAPARIN SODIUM 40 MG: 40 INJECTION SUBCUTANEOUS at 22:03

## 2024-11-14 RX ADMIN — PIPERACILLIN AND TAZOBACTAM 4.5 G: 4; .5 INJECTION, POWDER, FOR SOLUTION INTRAVENOUS at 13:49

## 2024-11-14 ASSESSMENT — ACTIVITIES OF DAILY LIVING (ADL)
ADLS_ACUITY_SCORE: 0

## 2024-11-14 ASSESSMENT — COLUMBIA-SUICIDE SEVERITY RATING SCALE - C-SSRS
1. IN THE PAST MONTH, HAVE YOU WISHED YOU WERE DEAD OR WISHED YOU COULD GO TO SLEEP AND NOT WAKE UP?: NO
6. HAVE YOU EVER DONE ANYTHING, STARTED TO DO ANYTHING, OR PREPARED TO DO ANYTHING TO END YOUR LIFE?: NO
2. HAVE YOU ACTUALLY HAD ANY THOUGHTS OF KILLING YOURSELF IN THE PAST MONTH?: NO

## 2024-11-14 NOTE — PROGRESS NOTES
[facility]  ED Nurse Handoff Report    ED Chief complaint: Leg Swelling and Wound Infection      ED Diagnosis:   Final diagnoses:   Cellulitis of left lower extremity   Paraplegia (H)   Heel ulceration, left, with unspecified severity (H)       Code Status: Full Code    Allergies:   Allergies   Allergen Reactions    Sulfa Antibiotics      Pt states cannot have sulfa because it reacts with his other medications       Patient Story: istory of DVT, tetraplegia who presents from a group home via EMS with left lower extremity swelling and wound infection. Patient notes that he has had an ulcer on his left heel for the past two years that has not fully healed. He notes that the erythema and edema at the site has been present for the past two days.   Focused Assessment:  Left heel wound, fevers    Treatments and/or interventions provided:   Medications   vancomycin (VANCOCIN) 2,500 mg in 0.9% NaCl 525 mL intermittent infusion (2,500 mg Intravenous $New Bag 11/14/24 1349)   piperacillin-tazobactam (ZOSYN) 4.5 g vial to attach to  mL bag (0 g Intravenous Stopped 11/14/24 1440)     Labs Ordered and Resulted from Time of ED Arrival to Time of ED Departure   COMPREHENSIVE METABOLIC PANEL - Abnormal       Result Value    Sodium 139      Potassium 4.7      Carbon Dioxide (CO2) 24      Anion Gap 10      Urea Nitrogen 10.0      Creatinine 0.79      GFR Estimate >90      Calcium 8.7 (*)     Chloride 105      Glucose 141 (*)     Alkaline Phosphatase 51      AST 33      ALT 17      Protein Total 7.2      Albumin 3.5      Bilirubin Total 0.2     CRP INFLAMMATION - Abnormal    CRP Inflammation 20.32 (*)    ERYTHROCYTE SEDIMENTATION RATE AUTO - Abnormal    Erythrocyte Sedimentation Rate 28 (*)    CBC WITH PLATELETS AND DIFFERENTIAL - Abnormal    WBC Count 8.7      RBC Count 4.32 (*)     Hemoglobin 12.4 (*)     Hematocrit 37.0 (*)     MCV 86      MCH 28.7      MCHC 33.5      RDW 13.5      Platelet Count 341      %  "Neutrophils 58      % Lymphocytes 32      % Monocytes 8      % Eosinophils 1      % Basophils 1      % Immature Granulocytes 1      NRBCs per 100 WBC 0      Absolute Neutrophils 5.1      Absolute Lymphocytes 2.8      Absolute Monocytes 0.7      Absolute Eosinophils 0.1      Absolute Basophils 0.1      Absolute Immature Granulocytes 0.1      Absolute NRBCs 0.0     ISTAT GASES LACTATE VENOUS POCT - Abnormal    Lactic Acid POCT 1.7      Bicarbonate Venous POCT 28      O2 Sat, Venous POCT 90 (*)     pCO2 Venous POCT 45      pH Venous POCT 7.39      pO2 Venous POCT 59 (*)     Base Excess/Deficit (+/-) POCT 2.0     INR - Normal    INR 1.14     BLOOD CULTURE     US Lower Extremity Venous Duplex Left   Preliminary Result   IMPRESSION: Negative for deep vein thrombosis in the visualized deep   veins of the left lower extremity.      XR Foot Left 3 Views   Final Result   IMPRESSION:  There is a prominent soft tissue defect along the plantar   aspect of the heel. The underlying calcaneus shows no cortical   destructive change or erosions to suggest osteomyelitis by radiograph.   MRI could assess further if needed. Bones are demineralized.      CANDIDO GOVEA MD            SYSTEM ID:  [id number]          Patient's response to treatments and/or interventions: Tolerated    To be done/followed up on inpatient unit:  pending IP orders    Does this patient have any cognitive concerns?:  A&Ox4    Activity level - Baseline/Home:  Wheelchair  Activity Level - Current:   Unknown    Patient's Preferred language: English   Needed?: No    Isolation: None  Infection: Not Applicable  Patient tested for COVID 19 prior to admission: NO  Bariatric?: No    Vital Signs:   Vitals:    11/14/24 1116 11/14/24 1122 11/14/24 1324   BP: 136/78  (!) 135/115   Pulse: 60  59   Resp:   18   Temp: (!) 100.7  F (38.2  C)     TempSrc: Oral     SpO2:   97%   Weight:  136.1 kg (300 lb)    Height:  1.803 m (5' 11\")        Cardiac Rhythm:     Was " the PSS-3 completed:   Yes  What interventions are required if any?               Family Comments:   OBS brochure/video discussed/provided to patient/family: No              Name of person given brochure if not patient:               Relationship to patient:     For the majority of the shift this patient's behavior was Green.   Behavioral interventions performed were none.    ED NURSE PHONE NUMBER: *[phone]

## 2024-11-14 NOTE — ED PROVIDER NOTES
"  Emergency Department Note      History of Present Illness     Chief Complaint   Leg Swelling and Wound Infection      HPI   Jody Jenkins is a 39 year old male who is not anticoagulated with a history of DVTtetraplegia who presents from his group home via EMS with left lower extremity swelling and possible wound infection. Patient notes that he has had an ulcer on his left heel for the past two years that has not fully healed. He notes that the erythema and edema at the site has been present for the past two days. Patient notes that he did not notice having a fever but was found to be febrile with a temp of 100 at his group home. He denies chest pain, shortness of breath, cough or pharyngitis. He denies body aches or headaches. He denies nausea and vomiting. He denies any new injury or trauma to his left heel.    Independent Historian   None    Review of External Notes   I reviewed the telephone note from the wound care clinic from yesterday.    Past Medical History     Medical History and Problem List   Adult failure to thrive  COVID-19  Elevated d-dimer  Elevated troponin  H/O febrile seizure  History of DVT (deep vein thrombosis)  History of pressure injury of skin  Homeless  Other acute pulmonary embolism without acute cor pulmonale (H)  Pressure injury of right buttock, stage 3 (H)  Sepsis (H)  Spastic tetraplegia  Traumatic brain injury   Cervical spinal cord injury    Medications   Betadine    Surgical History   Past Surgical History:   Procedure Laterality Date    DEBRIDEMENT FOOT Left 03/24/2022    ([facility])    ELBOW DEBRIDEMENT Right 07/01/2020    ([facility])       Physical Exam     Patient Vitals for the past 24 hrs:   BP Temp Temp src Pulse Resp SpO2 Height Weight   11/14/24 1324 (!) 135/115 -- -- 59 18 97 % -- --   11/14/24 1122 -- -- -- -- -- -- 1.803 m (5' 11\") 136.1 kg (300 lb)   11/14/24 1116 136/78 (!) 100.7  F (38.2  C) Oral 60 -- -- -- --     Physical Exam  Nursing note and vitals " reviewed.  Constitutional:  Oriented to person, place, and time. Cooperative.   HENT:   Nose:    Nose normal.   Mouth/Throat:   Mucous membranes are normal.   Eyes:    Conjunctivae normal and EOM are normal.      Pupils are equal, round, and reactive to light.   Neck:    Trachea normal.   Cardiovascular:  Normal rate, regular rhythm, normal heart sounds and normal pulses. No murmur heard.  Pulmonary/Chest:  Effort normal and breath sounds normal.   Abdominal:   Soft. Normal appearance and bowel sounds are normal.      There is no tenderness.      There is no rebound and no CVA tenderness.   Musculoskeletal:  Extremities atraumatic x 4.   Lymphadenopathy:  No cervical adenopathy.   Neurological:   Alert and oriented to person, place, and time. Normal strength in the upper extremities.  No use of the lower extremities. No cranial nerve deficit.  Normal sensation in the upper extremities.  No sensation to the lower extremities.  GCS eye subscore is 4. GCS verbal subscore is 5. GCS motor subscore is 6.   Skin:    Ulcer to the left heel.  Swelling and erythema as well as warmth to the the majority of the left lower leg.   Psychiatric:   Normal mood and affect.    Images of Patient's Left Foot    Images of Patient's Left Lower Extremity:          Diagnostics     Lab Results   Labs Ordered and Resulted from Time of ED Arrival to Time of ED Departure   COMPREHENSIVE METABOLIC PANEL - Abnormal       Result Value    Sodium 139      Potassium 4.7      Carbon Dioxide (CO2) 24      Anion Gap 10      Urea Nitrogen 10.0      Creatinine 0.79      GFR Estimate >90      Calcium 8.7 (*)     Chloride 105      Glucose 141 (*)     Alkaline Phosphatase 51      AST 33      ALT 17      Protein Total 7.2      Albumin 3.5      Bilirubin Total 0.2     CRP INFLAMMATION - Abnormal    CRP Inflammation 20.32 (*)    ERYTHROCYTE SEDIMENTATION RATE AUTO - Abnormal    Erythrocyte Sedimentation Rate 28 (*)    CBC WITH PLATELETS AND DIFFERENTIAL -  Abnormal    WBC Count 8.7      RBC Count 4.32 (*)     Hemoglobin 12.4 (*)     Hematocrit 37.0 (*)     MCV 86      MCH 28.7      MCHC 33.5      RDW 13.5      Platelet Count 341      % Neutrophils 58      % Lymphocytes 32      % Monocytes 8      % Eosinophils 1      % Basophils 1      % Immature Granulocytes 1      NRBCs per 100 WBC 0      Absolute Neutrophils 5.1      Absolute Lymphocytes 2.8      Absolute Monocytes 0.7      Absolute Eosinophils 0.1      Absolute Basophils 0.1      Absolute Immature Granulocytes 0.1      Absolute NRBCs 0.0     ISTAT GASES LACTATE VENOUS POCT - Abnormal    Lactic Acid POCT 1.7      Bicarbonate Venous POCT 28      O2 Sat, Venous POCT 90 (*)     pCO2 Venous POCT 45      pH Venous POCT 7.39      pO2 Venous POCT 59 (*)     Base Excess/Deficit (+/-) POCT 2.0     INR - Normal    INR 1.14     BLOOD CULTURE       Imaging   US Lower Extremity Venous Duplex Left   Preliminary Result   IMPRESSION: Negative for deep vein thrombosis in the visualized deep   veins of the left lower extremity.      XR Foot Left 3 Views   Final Result   IMPRESSION:  There is a prominent soft tissue defect along the plantar   aspect of the heel. The underlying calcaneus shows no cortical   destructive change or erosions to suggest osteomyelitis by radiograph.   MRI could assess further if needed. Bones are demineralized.      CANDIDO GOVEA MD            SYSTEM ID:  [id number]          Independent Interpretation   I independently interpreted the patient's left foot x-rays and agree with the negative reading for acute fracture.    ED Course      Medications Administered   Medications   vancomycin (VANCOCIN) 2,500 mg in 0.9% NaCl 525 mL intermittent infusion (2,500 mg Intravenous $New Bag 11/14/24 1349)   piperacillin-tazobactam (ZOSYN) 4.5 g vial to attach to  mL bag (0 g Intravenous Stopped 11/14/24 1440)       Procedures   Procedures     Discussion of Management   Admitting Hospitalist, Magda Emmanuel,  AKIL for Dr. Coulter    ED Course   ED Course as of 11/14/24 1452   Thu Nov 14, 2024   1248 I obtained history and examined the patient as noted above   1305 I spoke with the patient's legal guardian to discuss my findings and subsequent plan of care.   1437 I discussed my findings with Magda Emmanuel PA-C, hospitalist. They will admit my patient for Dr. Coulter.        Additional Documentation  None    Medical Decision Making / Diagnosis     CMS Diagnoses: The patient has signs of sepsis   Sepsis ED evaluation   The patient has signs of sepsis as evidenced by:  1. Presence of 2 SIRS criteria, suspected infection, AND  2. Organ dysfunction: None    Time zero: 1250 on 11/14/24 as this was the time when Provider determined that sepsis was present.    Note: Due to a national blood culture bottle shortage, reduced blood cultures may have been drawn on this patient.    Lactic Acid Results:  Recent Labs   Lab Test 11/14/24  1322   LACT 1.7       3 Hour Bundle 6 Hour Bundle (Reassessment)   Blood Cultures before IV Antibiotics: Yes  Antibiotics given: see below  Prehospital fluid volume (mL):                     Total fluids given (ED +Pre-hospital):  Full 30 mL/kg bolus intentionally NOT administered to this patient due to patient refusal. The target volume to infuse in this patient is 0.   Repeat Lactic Acid Level: Ordered by reflex for 2 hours after initial lactic acid collection.  Vasopressors: MAP>65 after initial IVF bolus, will continue to monitor fluid status and vital signs.  Repeat perfusion exam: I attest to having performed a repeat sepsis exam and assessment of perfusion at 5:49 PM .   BMI Readings from Last 1 Encounters:   11/14/24 41.86 kg/m        Anti-infectives (From admission through now)      Start     Dose/Rate Route Frequency Ordered Stop    11/14/24 1310  vancomycin (VANCOCIN) 2,500 mg in 0.9% NaCl 525 mL intermittent infusion         2,500 mg  over 120 Minutes Intravenous ONCE 11/14/24 1309 11/14/24  "1600    11/14/24 1300  piperacillin-tazobactam (ZOSYN) 4.5 g vial to attach to  mL bag        Note to Pharmacy: For [facility], [facility] and [facility]: For Zosyn-naive patients, use the \"Zosyn initial dose + extended infusion\" order panel.    4.5 g  over 30 Minutes Intravenous ONCE 11/14/24 1259 11/14/24 1440                MIPS       None    MDM   Jody COLT Jenkins is a 39 year old male brought in by EMS from his group home due to cellulitis to the left lower extremity.  This does not appear to be continuous with the heel ulcer.  He does have a fever here but his vital signs are otherwise normal.  His lactic acid and WBC also are normal, which is reassuring.  I obtained an ultrasound to rule out a DVT as well as an x-ray to  to see if he might have obvious findings of osteomyelitis.  He was provided IV Zosyn and vancomycin.  I spoke with the patient's guardian as well, and he was in agreement with the patient staying in the hospital.  I then spoke with Magda Emmanuel NP with the hospitalist service, who will be taking care of the patient.    Disposition   The patient was admitted to the hospital.     Diagnosis     ICD-10-CM    1. Cellulitis of left lower extremity  L03.116       2. Paraplegia (H)  G82.20       3. Heel ulceration, left, with unspecified severity (H)  L97.429              Scribe Disclosure:  I, Zacarias Say, am serving as a scribe at 12:21 PM on 11/14/2024 to document services personally performed by Rosas Morley MD based on my observations and the provider's statements to me.        Rosas Morley MD  11/14/24 1750    "

## 2024-11-14 NOTE — ED TRIAGE NOTES
Per EMS, BLE edema. Usually, elevates and goes away. Febrile 100.0. Denies injury or trauma. Coming from group home.

## 2024-11-14 NOTE — ED NOTES
[facility]  ED Nurse Handoff Report    ED Chief complaint: Leg Swelling and Wound Infection      ED Diagnosis:   Final diagnoses:   Cellulitis of left lower extremity   Paraplegia (H)   Heel ulceration, left, with unspecified severity (H)       Code Status: confirm with hospitalist    Allergies:   Allergies   Allergen Reactions    Sulfa Antibiotics      Pt states cannot have sulfa because it reacts with his other medications       Patient Story: Pt from group home by ambulance for report of BLE edema and worsening heel wound. Pt is paraplegic.    Focused Assessment:  Pt is alert and oriented, able to manage most ADLs independently. Receiving antibiotics for left heel ulcer. Pt has history of med non-compliance but has been cooperative in ED.     Treatments and/or interventions provided: Labs, xray, medications, monitoring  Labs Ordered and Resulted from Time of ED Arrival to Time of ED Departure   COMPREHENSIVE METABOLIC PANEL - Abnormal       Result Value    Sodium 139      Potassium 4.7      Carbon Dioxide (CO2) 24      Anion Gap 10      Urea Nitrogen 10.0      Creatinine 0.79      GFR Estimate >90      Calcium 8.7 (*)     Chloride 105      Glucose 141 (*)     Alkaline Phosphatase 51      AST 33      ALT 17      Protein Total 7.2      Albumin 3.5      Bilirubin Total 0.2     CRP INFLAMMATION - Abnormal    CRP Inflammation 20.32 (*)    ERYTHROCYTE SEDIMENTATION RATE AUTO - Abnormal    Erythrocyte Sedimentation Rate 28 (*)    CBC WITH PLATELETS AND DIFFERENTIAL - Abnormal    WBC Count 8.7      RBC Count 4.32 (*)     Hemoglobin 12.4 (*)     Hematocrit 37.0 (*)     MCV 86      MCH 28.7      MCHC 33.5      RDW 13.5      Platelet Count 341      % Neutrophils 58      % Lymphocytes 32      % Monocytes 8      % Eosinophils 1      % Basophils 1      % Immature Granulocytes 1      NRBCs per 100 WBC 0      Absolute Neutrophils 5.1      Absolute Lymphocytes 2.8      Absolute Monocytes 0.7      Absolute  "Eosinophils 0.1      Absolute Basophils 0.1      Absolute Immature Granulocytes 0.1      Absolute NRBCs 0.0     ISTAT GASES LACTATE VENOUS POCT - Abnormal    Lactic Acid POCT 1.7      Bicarbonate Venous POCT 28      O2 Sat, Venous POCT 90 (*)     pCO2 Venous POCT 45      pH Venous POCT 7.39      pO2 Venous POCT 59 (*)     Base Excess/Deficit (+/-) POCT 2.0     INR - Normal    INR 1.14     BLOOD CULTURE     US Lower Extremity Venous Duplex Left   Preliminary Result   IMPRESSION: Negative for deep vein thrombosis in the visualized deep   veins of the left lower extremity.      XR Foot Left 3 Views   Final Result   IMPRESSION:  There is a prominent soft tissue defect along the plantar   aspect of the heel. The underlying calcaneus shows no cortical   destructive change or erosions to suggest osteomyelitis by radiograph.   MRI could assess further if needed. Bones are demineralized.      CANDIDO GOVEA MD            SYSTEM ID:  [id number]          Patient's response to treatments and/or interventions: Tolerating interventions well    To be done/followed up on inpatient unit:  Continue plan of care    Does this patient have any cognitive concerns?:  none noted    Activity level - Baseline/Home:  Wheelchair  Activity Level - Current:   Wheelchair    Patient's Preferred language: English   Needed?: No    Isolation: None  Infection: Not Applicable  Patient tested for COVID 19 prior to admission: NO  Bariatric?: No    Vital Signs:   Vitals:    11/14/24 1116 11/14/24 1122 11/14/24 1324   BP: 136/78  (!) 135/115   Pulse: 60  59   Resp:   18   Temp: (!) 100.7  F (38.2  C)     TempSrc: Oral     SpO2:   97%   Weight:  136.1 kg (300 lb)    Height:  1.803 m (5' 11\")        Cardiac Rhythm:     Was the PSS-3 completed:   Yes  What interventions are required if any?               Family Comments: not present  OBS brochure/video discussed/provided to patient/family: Yes              Name of person given brochure if not " patient: n/a              Relationship to patient: n/a    For the majority of the shift this patient's behavior was Green.   Behavioral interventions performed were rounding.    ED NURSE PHONE NUMBER: *[phone]

## 2024-11-14 NOTE — ED NOTES
Bed: ED09  Expected date:   Expected time:   Means of arrival:   Comments:  [address] 40M leg infection, wheelchair bound ETA 1110

## 2024-11-14 NOTE — PHARMACY-ADMISSION MEDICATION HISTORY
Pharmacist Admission Medication History    Admission medication history is complete. The information provided in this note is only as accurate as the sources available at the time of the update.    Information Source(s): Facility (TCU/NH/) medication list/MAR ([facility]) via N/A    Pertinent Information: Note from group home states all medications discontinued d/t patient's refusal to take them. Patient received acetaminophen on the morning of ED visit.     Changes made to PTA medication list:  Added: None  Deleted: None  Changed: None    Allergies reviewed with patient and updates made in EHR: no    Medication History Completed By: JOSE UGALDE RPH 11/14/2024 2:10 PM    PTA Med List   Medication Sig Last Dose/Taking    acetaminophen (TYLENOL) 500 MG tablet Take 500 mg by mouth every 6 hours as needed for mild pain. 11/14/2024

## 2024-11-14 NOTE — H&P
"[facility]  History and Physical - Hospitalist Service       Date of Admission:  11/14/2024  PRIMARY CARE PROVIDER:    No Ref-Primary, Physician    Assessment & Plan   Jody Jenkins is a 39 year old male with PMH of hx DVT and PE, spastic tetraplegia, hx TBI, hx cervical spinal cord injury, who was admitted on 11/14/24 with LLE cellulitis.    LLE cellulitis  Chronic pressure injury of the left heel  *Presents with LLE erythema, warmth, swelling x 2 days  *T 100.7 F, no leukocytosis, normal lactic acid  *Xray left foot w/o evidence of osteomyelitis  *Venous US (-) for DVT  - Continue IV Vancomycin  - MRSA nares PCR  - Blood culture pending  - WOC consult  - Keep LLE elevated    History of TBI  History of spinal cord injury  Spastic tetraplegia  *Resides in group home, bed bound at baseline but can pivot. Denies neurogenic bladder.  -  consult for discharge planning    History of PE and DVT (2016 and 2017)  *Noted. No longer on anticoagulation.    Clinically Significant Risk Factors Present on Admission                       # Dementia: noted on problem list       # Severe Obesity: Estimated body mass index is 41.84 kg/m  as calculated from the following:    Height as of this encounter: 1.803 m (5' 11\").    Weight as of this encounter: 136.1 kg (300 lb).               Diet:  Regular  DVT Prophylaxis: Enoxaparin (Lovenox) SQ  Arias Catheter: Not present  Lines: None     Cardiac Monitoring: None  Code Status:  Full         Disposition Plan      Expected Discharge Date: 11/15/2024             Entered: Magda Emmanuel PA-C 11/14/2024, 2:42 PM     Medically Ready for Discharge: Anticipated Tomorrow       The patient's care was discussed with the Attending Physician, Dr. Coulter, Patient.    Magda Emmanuel PA-C  [facility]  Securely message with the Vocera Web Console (learn more " here)      ______________________________________________________________________    Chief Complaint   LLE swelling and erythema    History is obtained from the patient and EMR.      History of Present Illness   Jody Jenkins is a 39 year old male with PMH of hx DVT and PE, spastic tetraplegia, hx TBI, hx cervical spinal cord injury, who presented to the ED from his group home on 11/14/24 for evaluation of LLE swelling and erythema.    Patient states he has had a left heel ulcer x 2 years which has had incomplete healing. He notes increased erythema and swelling of the left lower extremity over the last couple of days. Had a temp of 100 F at the group home. No chest pain or dyspnea. No new injury or trauma to the foot. Denies any LLE pain.    In the ED, temp 100.7 F with HR 60, /78, saturating 97% on room air. CBC with no leukocytosis. CMP unremarkable. CRP 20, glucose 141. Lactic acid 1.7. Xray left foot without changes to suggest osteomyelitis. Venous US LLE negative for DVT. He was given IV Vancomycin and IV Zosyn in the ED. ED provider spoke with guardian who consent for admission.    Past Medical History    I have reviewed this patient's medical history and updated it with pertinent information if needed.   Past Medical History:   Diagnosis Date    Adult failure to thrive 02/25/2022    COVID-19 10/19/2021    Elevated d-dimer 10/19/2021    Elevated troponin 10/19/2021    H/O febrile seizure 09/05/2013    Secondary to fever at 7 yo No subsequent seizures    History of DVT (deep vein thrombosis) 04/14/2017    History of DVT (deep vein thrombosis) December 2016, May 2017, September 2017.   Refused warfarin for treatment      History of pressure injury of skin 06/24/2018    History of pressure ulcer December 2016. Healed by April 2017.      Homeless 06/24/2018    Other acute pulmonary embolism without acute cor pulmonale (H) 10/19/2021    Pressure injury of right buttock, stage 3 (H) 07/11/2018    Sepsis  (H) 07/10/2022       Prior to Admission Medications   Prior to Admission Medications   Prescriptions Last Dose Informant Patient Reported? Taking?   acetaminophen (TYLENOL) 500 MG tablet 11/14/2024  Yes Yes   Sig: Take 500 mg by mouth every 6 hours as needed for mild pain.      Facility-Administered Medications: None     Allergies   Allergies   Allergen Reactions    Sulfa Antibiotics      Pt states cannot have sulfa because it reacts with his other medications       Physical Exam   Vital Signs: Temp: (!) 100.7  F (38.2  C) Temp src: Oral BP: (!) 135/115 Pulse: 59   Resp: 18 SpO2: 97 % O2 Device: None (Room air)    Weight: 300 lbs 0 oz    Constitutional: Awake, alert, cooperative, no apparent distress.    ENT: Normocephalic, without obvious abnormality, atraumatic. Normal sclera.    Neck: Supple, symmetrical, trachea midline  Pulmonary: No increased work of breathing, good air exchange, clear to auscultation bilaterally  Cardiovascular: Regular rate and rhythm  Neuro: Oriented x 3. Moves all extremities spontaneously. No focal neuro deficits.  Psych:  flat affect  Extremities: Erythema and warmth of LLE. Ulcer of left heel.                    Medical Decision Making       60 MINUTES SPENT BY ME on the date of service doing chart review, history, exam, documentation & further activities per the note.         Data   Data reviewed today: I reviewed all medications, new labs and imaging results over the last 24 hours. I personally reviewed no images or EKG's today.      I have personally reviewed the following data over the past 24 hrs:    8.7  \   12.4 (L)   / 341     139 105 10.0 /  141 (H)   4.7 24 0.79 \     ALT: 17 AST: 33 AP: 51 TBILI: 0.2   ALB: 3.5 TOT PROTEIN: 7.2 LIPASE: N/A     Procal: N/A CRP: 20.32 (H) Lactic Acid: 1.7       INR:  1.14 PTT:  N/A   D-dimer:  N/A Fibrinogen:  N/A       Imaging results reviewed over the past 24 hrs:   Recent Results (from the past 24 hours)   XR Foot Left 3 Views    Narrative     FOOT LEFT THREE OR MORE VIEWS November 14, 2024 1:54 PM     HISTORY: Heel ulcer, rule out osteomyelitis.    COMPARISON: None.       Impression    IMPRESSION:  There is a prominent soft tissue defect along the plantar  aspect of the heel. The underlying calcaneus shows no cortical  destructive change or erosions to suggest osteomyelitis by radiograph.  MRI could assess further if needed. Bones are demineralized.   US Lower Extremity Venous Duplex Left    Narrative    VENOUS ULTRASOUND LEFT LOWER EXTREMITY November 14, 2024 2:27 PM     HISTORY: Left swelling, redness, deep vein thrombosis.    COMPARISON: None.    TECHNIQUE: Color Doppler and spectral waveform analysis performed  throughout the deep veins of the left lower extremity.    FINDINGS: The left common femoral, proximal great saphenous, femoral,  and popliteal veins demonstrate normal blood flow, compression, and  augmentation. Only partial visualization of posterior tibial and  peroneal veins, though patent in the visible segments. Contralateral  right common femoral vein is patent.      Impression    IMPRESSION: Negative for deep vein thrombosis in the visualized deep  veins of the left lower extremity.

## 2024-11-15 LAB
ANION GAP SERPL CALCULATED.3IONS-SCNC: 9 MMOL/L (ref 7–15)
BASOPHILS # BLD AUTO: 0.1 10E3/UL (ref 0–0.2)
BASOPHILS NFR BLD AUTO: 1 %
BUN SERPL-MCNC: 9.5 MG/DL (ref 6–20)
CALCIUM SERPL-MCNC: 8.1 MG/DL (ref 8.8–10.4)
CHLORIDE SERPL-SCNC: 104 MMOL/L (ref 98–107)
CREAT SERPL-MCNC: 0.83 MG/DL (ref 0.67–1.17)
CRP SERPL-MCNC: 11.95 MG/L
EGFRCR SERPLBLD CKD-EPI 2021: >90 ML/MIN/1.73M2
EOSINOPHIL # BLD AUTO: 0.1 10E3/UL (ref 0–0.7)
EOSINOPHIL NFR BLD AUTO: 2 %
ERYTHROCYTE [DISTWIDTH] IN BLOOD BY AUTOMATED COUNT: 13.4 % (ref 10–15)
GLUCOSE SERPL-MCNC: 110 MG/DL (ref 70–99)
HCO3 SERPL-SCNC: 22 MMOL/L (ref 22–29)
HCT VFR BLD AUTO: 39.5 % (ref 40–53)
HGB BLD-MCNC: 13 G/DL (ref 13.3–17.7)
IMM GRANULOCYTES # BLD: 0.1 10E3/UL
IMM GRANULOCYTES NFR BLD: 1 %
LYMPHOCYTES # BLD AUTO: 2.7 10E3/UL (ref 0.8–5.3)
LYMPHOCYTES NFR BLD AUTO: 31 %
MCH RBC QN AUTO: 28.2 PG (ref 26.5–33)
MCHC RBC AUTO-ENTMCNC: 32.9 G/DL (ref 31.5–36.5)
MCV RBC AUTO: 86 FL (ref 78–100)
MONOCYTES # BLD AUTO: 0.5 10E3/UL (ref 0–1.3)
MONOCYTES NFR BLD AUTO: 6 %
NEUTROPHILS # BLD AUTO: 5.1 10E3/UL (ref 1.6–8.3)
NEUTROPHILS NFR BLD AUTO: 60 %
NRBC # BLD AUTO: 0 10E3/UL
NRBC BLD AUTO-RTO: 0 /100
PLATELET # BLD AUTO: 369 10E3/UL (ref 150–450)
POTASSIUM SERPL-SCNC: 4 MMOL/L (ref 3.4–5.3)
RBC # BLD AUTO: 4.61 10E6/UL (ref 4.4–5.9)
SODIUM SERPL-SCNC: 135 MMOL/L (ref 135–145)
WBC # BLD AUTO: 8.5 10E3/UL (ref 4–11)

## 2024-11-15 PROCEDURE — 250N000011 HC RX IP 250 OP 636: Performed by: INTERNAL MEDICINE

## 2024-11-15 PROCEDURE — 250N000013 HC RX MED GY IP 250 OP 250 PS 637: Performed by: PHYSICIAN ASSISTANT

## 2024-11-15 PROCEDURE — 96375 TX/PRO/DX INJ NEW DRUG ADDON: CPT

## 2024-11-15 PROCEDURE — 258N000003 HC RX IP 258 OP 636: Performed by: INTERNAL MEDICINE

## 2024-11-15 PROCEDURE — G0378 HOSPITAL OBSERVATION PER HR: HCPCS

## 2024-11-15 PROCEDURE — 36415 COLL VENOUS BLD VENIPUNCTURE: CPT | Performed by: PHYSICIAN ASSISTANT

## 2024-11-15 PROCEDURE — G0463 HOSPITAL OUTPT CLINIC VISIT: HCPCS

## 2024-11-15 PROCEDURE — 96372 THER/PROPH/DIAG INJ SC/IM: CPT | Performed by: PHYSICIAN ASSISTANT

## 2024-11-15 PROCEDURE — 250N000011 HC RX IP 250 OP 636: Performed by: PHYSICIAN ASSISTANT

## 2024-11-15 PROCEDURE — 80048 BASIC METABOLIC PNL TOTAL CA: CPT | Performed by: PHYSICIAN ASSISTANT

## 2024-11-15 PROCEDURE — 82435 ASSAY OF BLOOD CHLORIDE: CPT | Performed by: PHYSICIAN ASSISTANT

## 2024-11-15 PROCEDURE — 86140 C-REACTIVE PROTEIN: CPT | Performed by: PHYSICIAN ASSISTANT

## 2024-11-15 PROCEDURE — 99232 SBSQ HOSP IP/OBS MODERATE 35: CPT | Performed by: PHYSICIAN ASSISTANT

## 2024-11-15 PROCEDURE — 250N000011 HC RX IP 250 OP 636: Mod: JZ | Performed by: PHYSICIAN ASSISTANT

## 2024-11-15 PROCEDURE — 97602 WOUND(S) CARE NON-SELECTIVE: CPT

## 2024-11-15 PROCEDURE — 96376 TX/PRO/DX INJ SAME DRUG ADON: CPT | Mod: 59

## 2024-11-15 PROCEDURE — 85025 COMPLETE CBC W/AUTO DIFF WBC: CPT | Performed by: PHYSICIAN ASSISTANT

## 2024-11-15 RX ORDER — CEFAZOLIN SODIUM 2 G/100ML
2 INJECTION, SOLUTION INTRAVENOUS EVERY 8 HOURS
Status: DISCONTINUED | OUTPATIENT
Start: 2024-11-15 | End: 2024-11-16 | Stop reason: HOSPADM

## 2024-11-15 RX ADMIN — VANCOMYCIN HYDROCHLORIDE 1250 MG: 10 INJECTION, POWDER, LYOPHILIZED, FOR SOLUTION INTRAVENOUS at 00:08

## 2024-11-15 RX ADMIN — CEFAZOLIN SODIUM 2 G: 2 INJECTION, SOLUTION INTRAVENOUS at 18:18

## 2024-11-15 RX ADMIN — ACETAMINOPHEN 650 MG: 325 TABLET, FILM COATED ORAL at 21:40

## 2024-11-15 RX ADMIN — ENOXAPARIN SODIUM 40 MG: 40 INJECTION SUBCUTANEOUS at 21:40

## 2024-11-15 RX ADMIN — ENOXAPARIN SODIUM 40 MG: 40 INJECTION SUBCUTANEOUS at 08:32

## 2024-11-15 RX ADMIN — VANCOMYCIN HYDROCHLORIDE 1250 MG: 10 INJECTION, POWDER, LYOPHILIZED, FOR SOLUTION INTRAVENOUS at 12:25

## 2024-11-15 ASSESSMENT — ACTIVITIES OF DAILY LIVING (ADL)
ADLS_ACUITY_SCORE: 0
DEPENDENT_IADLS:: CLEANING;COOKING;LAUNDRY;SHOPPING;MEAL PREPARATION;MEDICATION MANAGEMENT;MONEY MANAGEMENT;TRANSPORTATION
ADLS_ACUITY_SCORE: 0

## 2024-11-15 NOTE — PROGRESS NOTES
[facility]    Medicine Progress Note - Hospitalist Service    Date of Admission:  11/14/2024    Assessment & Plan   Jody Jenkins is a 39 year old male with PMH of hx DVT and PE, spastic tetraplegia, hx TBI, hx cervical spinal cord injury, who was admitted on 11/14/24 with LLE cellulitis.    LLE cellulitis  Chronic pressure injury of the left heel  *Presents with LLE erythema, warmth, swelling x 2 days  *T 100.7 F, no leukocytosis, normal lactic acid  *Xray left foot w/o evidence of osteomyelitis  *Venous US (-) for DVT  *Much improvement to edema and erythema after 1 day of abx  - Observation status  - Discontinue IV Vancomycin and start Ancef for now  - MRSA nares PCR negative  - Blood culture pending  - WOC consult, wound cares noted, d/w WOCN, no concern for infection of heel, has wound care follow up as outpatient already scheduled, follows closely with podiatry  - Keep LLE elevated  - Unable to return to group home 11/15 due to timing of ride.  Plan to discharge back to [facility] on on 11/16 at 10 AM with 7 more days of Keflex to complete 10-day course. Medically stable.    11/14:        11/15:        History of TBI  History of spinal cord injury  Spastic tetraplegia  *Resides in group home, bed bound at baseline but can pivot. Denies neurogenic bladder.  -  consult for discharge planning    History of PE and DVT (2016 and 2017)  *Noted. No longer on anticoagulation.       Observation Goals: -diagnostic tests and consults completed and resulted, -vital signs normal or at patient baseline, -infection is improving, Nurse to notify provider when observation goals have been met and patient is ready for discharge.  Diet: Regular Diet Adult  Diet    DVT Prophylaxis: Ambulate every shift  Arias Catheter: Not present  Lines: None     Cardiac Monitoring: None  Code Status: Full Code      Clinically Significant Risk Factors Present on Admission           # Hypocalcemia: Lowest Ca = 8.1 mg/dL  "in last 2 days, will monitor and replace as appropriate             # Dementia: noted on problem list       # Severe Obesity: Estimated body mass index is 41.86 kg/m  as calculated from the following:    Height as of this encounter: 1.803 m (5' 10.98\").    Weight as of this encounter: 136.1 kg (300 lb).              Social Drivers of Health     Received from [employer] [facility], [employer]  [facility]    Social Connections          Disposition Plan     Medically Ready for Discharge: Ready Now           The patient's care was discussed with the Attending Physician, Dr. Reynolds, Bedside Nurse, Care Coordinator/, and Patient.    Kristina Mansfield PA-C  Hospitalist Service  [facility]  Securely message with Yu Rong (more info)  Text page via [facility] Paging/Directory   ______________________________________________________________________    Interval History   Seen and examined.  Pain much improved, patient feels swelling and redness has much improved.  Afebrile greater than 24 hours. He would like to discharge as able back to his group home.  Discussed with care coordinator and nurse, unable to arrange a ride home for today therefore patient will stay 1 more night and discharged on oral Keflex tomorrow. Questions welcomed and answered to the best of my knowledge.    Physical Exam   Vital Signs: Temp: 98.4  F (36.9  C) Temp src: Oral BP: 129/73 Pulse: 64   Resp: 18 SpO2: 96 % O2 Device: None (Room air)    Weight: 300 lbs 0 oz    Physical Exam    General: Awake, alert, pleasant gentleman who appears stated age. Looks comfortable sitting at edge of bed. No acute distress.  HEENT: Normocephalic, atraumatic. Extraocular movements intact.   Respiratory: Clear to auscultation bilaterally, no rales, wheezing, or rhonchi.  Cardiovascular: Regular rate and rhythm, +S1 and S2, no murmur auscultated. No peripheral edema.   Gastrointestinal: " Soft, non-tender, non-distended. Bowel sounds present.  Skin: Warm, dry. No obvious rashes or lesions on exposed skin. Dorsalis pedis pulses palpable bilaterally.  Left lower extremity edematous and mild erythema, chronic discoloration and hemosiderin staining.  Lower foot is wrapped by wound care nurse.  See photos above  Musculoskeletal: No joint swelling, erythema or tenderness. Moves all extremities equally.  Neurologic: AAO x3.  Psychiatric: Appropriate mood and affect. No obvious anxiety or depression.      Medical Decision Making       >35 MINUTES SPENT BY ME on the date of service doing chart review, history, exam, documentation & further activities per the note.      Data     I have personally reviewed the following data over the past 24 hrs:    8.5  \   13.0 (L)   / 369     135 104 9.5 /  110 (H)   4.0 22 0.83 \     Procal: N/A CRP: 11.95 (H) Lactic Acid: N/A         Imaging results reviewed over the past 24 hrs:   No results found for this or any previous visit (from the past 24 hours).

## 2024-11-15 NOTE — CONSULTS
Care Management Initial Consult    General Information  Assessment completed with: Care Team MemberMeg  Type of CM/SW Visit: Initial Assessment    Primary Care Provider verified and updated as needed: Yes   Readmission within the last 30 days: no previous admission in last 30 days      Reason for Consult: discharge planning  Advance Care Planning: Advance Care Planning Reviewed: present on chart          Communication Assessment  Patient's communication style: spoken language (English or Bilingual)             Cognitive  Cognitive/Neuro/Behavioral: WDL                      Living Environment:   People in home: facility resident     Current living Arrangements: group home      Able to return to prior arrangements: yes       Family/Social Support:  Care provided by: self, homecare agency  Provides care for:    Marital Status: Single  Support system:            Description of Support System:           Current Resources:   Patient receiving home care services: Yes  Skilled Home Care Services: Skilled Nursing   -[facility] Home health care at [phone]  Community Resources: Group Home, Home Care  Equipment currently used at home: wheelchair, manual, lift device  Supplies currently used at home:      Employment/Financial:  Employment Status: disabled        Financial Concerns:  none           Does the patient's insurance plan have a 3 day qualifying hospital stay waiver?  Yes     Which insurance plan 3 day waiver is available? Alternative insurance waiver    Will the waiver be used for post-acute placement? No    Lifestyle & Psychosocial Needs:  Social Drivers of Health     Food Insecurity: No Food Insecurity (10/15/2021)    Received from [facility], [facility] and [facility]    Hunger Vital Sign     Worried About Running Out of Food in the Last Year: Never true     Ran Out of Food in the Last Year: Never true   Depression: Not at risk (10/15/2021)    Received  from [facility], [facility]    PHQ-2     PHQ-2 Total: 0   Housing Stability: Not on file   Tobacco Use: Low Risk  (2/6/2023)    Received from [facility] [employer] [facility], [facility]    Patient History     Smoking Tobacco Use: Never     Smokeless Tobacco Use: Never     Passive Exposure: Never   Financial Resource Strain: Low Risk  (10/15/2021)    Received from [facility], [facility]    Overall Financial Resource Strain (CARDIA)     Difficulty of Paying Living Expenses: Not hard at all   Alcohol Use: Not on file   Transportation Needs: No Transportation Needs (10/15/2021)    Received from [facility], [facility]    PRAPARE - Transportation     Lack of Transportation (Medical): No     Lack of Transportation (Non-Medical): No   Physical Activity: Not on file   Interpersonal Safety: Low Risk  (10/21/2024)    Interpersonal Safety     Do you feel physically and emotionally safe where you currently live?: Yes     Within the past 12 months, have you been hit, slapped, kicked or otherwise physically hurt by someone?: No     Within the past 12 months, have you been humiliated or emotionally abused in other ways by your partner or ex-partner?: No   Stress: Not on file   Social Connections: Unknown (2/6/2023)    Received from [facility] [employer] [facility], [facility]    Social Connections     Frequency of Communication with Friends and Family: Not on file   Health Literacy: Not on file       Functional Status:  Prior to admission patient needed assistance:   Dependent ADLs:: Positioning, Wheelchair-with assist  Dependent IADLs:: Cleaning, Cooking, Laundry, Shopping, Meal Preparation, Medication Management, Money  Management, Transportation       Mental Health Status:  Mental Health Status: No Current Concerns       Chemical Dependency Status:      No concerns          Values/Beliefs:  Spiritual, Cultural Beliefs, Quaker Practices, Values that affect care: no               Discussed  Partnership in Safe Discharge Planning  document with patient/family: Yes:     Additional Information:  Writer was informed that patient might be able to discharge over the weekend. Writer called and spoke Meg at [phone].  CM team called phone [phone] and spoke with/left message for Meg to request background information on pt's place of living.      In what kind of facility does pt reside (SNF, [facility], group home)? Group home  Name of building/unit: [facility]    What do you know about pt's baseline cognition and mobility? Alert and orientated, wheelchair bound, some behaviors,was at [facility] for 8 months before coming to [facility]        What level of cognition/mobility is required for safe return? same   Is resident enrolled in any services?  Yes meals, medication management, assist of 1-2 and lift if needed       (ask about: meals, physical assist with ADLs, med administration, housekeeping, and if they have built in or wearable call buttons)   Are there grab bars in the bathroom, shower, and/or shower chair in unit? Describe: shower chair, grab bars  Does pt have any personal DME? wheelchair   Best number to reach facility nursing? Phone [phone]  Evening/weekend number? same  Fax: [phone]   What does the facility need faxed to them from us at discharge? Discharge orders   Does facility manage medications?  yes   If yes, any contracted pharmacy? [facility]        If new Rx meds at discharge, fill at hospital pharmacy or contracted pharmacy?  Check at discharge!   Does [facility] have a copy of any Health Care Directive of [facility]? yes         Explained we can provide therapy notes and information when  it is available and provided callback contact information.     -Meg told writer that patient can return over the weekend, tomorrow, he would just need  Health transport, usually wheelchair for transport back to Group home. His wheelchair is at the group home.   -Of note,Patient might get anxious in the hospital as he was at [facility] for 8 months before coming to [facility] per Meg.  -Patient is open to [facility] for RN weekly with his wound care. Writer called [phone].Spoke to intake.They would need resumption orders only if patient was changed to inpatient. They would like orders faxed to them but are following along in [facility].   [facility] branch selected in Epic.    Next Steps: follow for discharge planning    Randi Marin RN

## 2024-11-15 NOTE — PROGRESS NOTES
Admission/Transfer from: ED  2 RN skin assessment completed. Yes, with [facility] INFANTE RN  Significant findings include: BLE redness swelling edema +3, +. Chronic pressure injury to the LLE heel.   WOC Nurse Consult Ordered? Yes

## 2024-11-15 NOTE — PHARMACY-VANCOMYCIN DOSING SERVICE
Pharmacy Vancomycin Initial Note  Date of Service 2024  Patient's  1985  39 year old, male    Indication: Skin and Soft Tissue Infection    Current estimated CrCl = Estimated Creatinine Clearance: 178.6 mL/min (based on SCr of 0.79 mg/dL).    Creatinine for last 3 days  2024:  1:20 PM Creatinine 0.79 mg/dL    Recent Vancomycin Level(s) for last 3 days  No results found for requested labs within last 3 days.      Vancomycin IV Administrations (past 72 hours)                     vancomycin (VANCOCIN) 2,500 mg in 0.9% NaCl 525 mL intermittent infusion (mg) 2,500 mg New Bag 24 1349                    Nephrotoxins and other renal medications (From now, onward)      Start     Dose/Rate Route Frequency Ordered Stop    11/15/24 0000  vancomycin (VANCOCIN) 1,250 mg in 0.9% NaCl 262.5 mL intermittent infusion         1,250 mg  over 90 Minutes Intravenous EVERY 12 HOURS 24 1924              Contrast Orders - past 72 hours (72h ago, onward)      None            [employer] Prediction of Planned Initial Vancomycin Regimen  Loading dose: 2500mg x 1   Regimen: 1250 mg IV every 12 hours.  Start time: 01:49 on 11/15/2024  Exposure target: AUC24 (range)400-600 mg/L.hr   AUC24,ss: 448 mg/L.hr  Probability of AUC24 > 400: 58 %  Ctrough,ss: 11.3 mg/L  Probability of Ctrough,ss > 20: 25 %  Probability of nephrotoxicity (Lodise BERNARDO ): 7 %          Plan:  Start vancomycin  1250 mg IV q12h.   Vancomycin monitoring method: AUC  Vancomycin therapeutic monitoring goal: 400-600 mg*h/L  Pharmacy will check vancomycin levels as appropriate in 1-3 Days.    Serum creatinine levels will be ordered daily for the first week of therapy and at least twice weekly for subsequent weeks.      Nellie Pierson [facility]

## 2024-11-15 NOTE — PROGRESS NOTES
RN updated  Elva ANTOINE about patient condition. Please call her if have any questions. [phone]

## 2024-11-15 NOTE — CONSULTS
[facility] Nurse Inpatient Wound Assessment     Reason for consultation: Evaluate and treat Lt heel - POA    Assessment  Lt heel:  POA Stage 3 as documented by [facility].  No overt local s/s infection. LLE cellulites managed by Hospitalist. Pt follow    Treatment Plan  Wound care: Lt heel   -Iodosorb gel wound gel  tube sent with patient.   -Change dressing Daily  - Clean wound with VASHE.  Moisten gauze and let it sit in wound bed for 10-15 minutes  -Clean emma-wound skin with VASHE  -Pat dry  -Apply quarter size Augustine of Iodosorb gel onto Adaptic  -press Iodosorb gel side down into wound bed.   -Cover with foam  -Secure with Kerlix wrap   - date and time dressing      Orders In Epic  Recommended provider order:  Possible podiatry consult   [facility] Nurse follow-up plan: weekly and prn   Nursing to notify the Provider(s) and re-consult the [facility] Nurse if wound(s) deteriorates or new skin concern.    Patient History  According to provider note(s):    [facility]  History and Physical - Hospitalist Service       Date of Admission:  11/14/2024  PRIMARY CARE PROVIDER:    No Ref-Primary, Physician        Assessment & Plan  Jody COLT Jenkins is a 39 year old male with PMH of hx DVT and PE, spastic tetraplegia, hx TBI, hx cervical spinal cord injury, who was admitted on 11/14/24 with LLE cellulitis.     LLE cellulitis  Chronic pressure injury of the left heel  *Presents with LLE erythema, warmth, swelling x 2 days  *T 100.7 F, no leukocytosis, normal lactic acid  *Xray left foot w/o evidence of osteomyelitis  *Venous US (-) for DVT  - Continue IV Vancomycin  - MRSA nares PCR  - Blood culture pending  - [facility] consult  - Keep LLE elevated     History of TBI  History of spinal cord injury  Spastic tetraplegia  *Resides in group home, bed bound at baseline but can pivot. Denies neurogenic bladder.  -  consult for discharge planning     History of PE and DVT (2016 and 2017)  *Noted.  "No longer on anticoagulation.        Clinically Significant Risk Factors Present on Admission                       # Dementia: noted on problem list        # Severe Obesity: Estimated body mass index is 41.84 kg/m  as calculated from the following:    Height as of this encounter: 1.803 m (5' 11\").    Weight as of this encounter: 136.1 kg (300 lb).               Diet:  Regular  DVT Prophylaxis: Enoxaparin (Lovenox) SQ  [place] Catheter: Not present  Lines: None     Cardiac Monitoring: None  Code Status:  Full       Objective Data  Containment of urine/stool: BRP    Active Diet Order:  Orders Placed This Encounter      Regular Diet Adult    Output:   I/O last 3 completed shifts:  In: -   Out: 600 [Urine:600]    Risk Assessment:   Sensory Perception: 3-->slightly limited  Moisture: 3-->occasionally moist  Activity: 2-->chairfast  Mobility: 2-->very limited  Nutrition: 3-->adequate  Friction and Shear: 2-->potential problem  Dino Score: 15                          Labs:   Recent Labs   Lab 11/14/24  1320   ALBUMIN 3.5   HGB 12.4*   INR 1.14   WBC 8.7       Physical Exam  Skin inspection: LLE and Lt foot    Wound Location:  Lt heel   Date of last photo: 11-15-24       [facility] photo: 11-14-24 Lt heel - POA          Measurements (length x width x depth, in cm):  3.0cm x  x 3.5 x 2.5cm   Wound Base: 100% thin adherent fibrin base   Tunneling: NA  Undermining: NA  Palpation of the wound bed: firm  Periwound skin:   Color: shiny   Temperature: warm   Drainage: small serous drainage  Odor: None today   Pain: tenderness     Interventions  Current support surface: atmos air  Current off-loading measures: pillows and turning, heel suspension  Visual inspection of wound(s) completed  Wound Care: per POC  Supplies: In Pt room and floor supply room   Education provided: Discussed change in tx plan until follow-up appt @ [facility]  Discussed plan of care with THU Barry RN, CWOCN   "

## 2024-11-15 NOTE — DISCHARGE INSTRUCTIONS
Wound care: Lt heel   -Iodosorb gel wound gel  tube sent with patient.   -Change dressing Daily  - Clean wound with VASHE.  Moisten gauze and let it sit in wound bed for 10-15 minutes  -Clean emma-wound skin with VASHE  -Pat dry  -Apply quarter size [place] of Iodosorb gel onto Adaptic  -press Iodosorb gel side down into wound bed.   -Cover with foam  -Secure with Kerlix wrap   - date and time dressing    Pt needs to follow-up with Wound healing [place] at his next scheduled appt.

## 2024-11-15 NOTE — PROGRESS NOTES
Observation goals  PRIOR TO DISCHARGE       Comments:   -diagnostic tests and consults completed and resulted : Met  -vital signs normal or at patient baseline : Met  -infection is improving not met  Nurse to notify provider when observation goals have been met and patient is ready for discharge.

## 2024-11-15 NOTE — PROGRESS NOTES
Observation goals  PRIOR TO DISCHARGE        Comments:   -diagnostic tests and consults completed and resulted not met, WOC consulted  -vital signs normal or at patient baseline met  -infection is improving not met  Nurse to notify provider when observation goals have been met and patient is ready for discharge.

## 2024-11-15 NOTE — PROGRESS NOTES
-diagnostic tests and consults completed and resulted : not met, WOC consult    -vital signs normal or at patient baseline: met  -infection is improving: not met  Nurse to notify provider when observation goals have been met and patient is ready for discharge.

## 2024-11-15 NOTE — PLAN OF CARE
Goal Outcome Evaluation:  11/14/24 [id number]  Summary:  LLE cellulitis.  Orientation: AOX4  Observation Goals (met & not met): not met  Activity Level: reported by pt can pivot ax1. Has not get oob this shift yet. Pt can turn himself independent.   Fall Risk: yes  Behavior & Aggression Tool Color: green  Pain Management: denies pain  ABNL VS/O2: VSS RA  ABNL Lab/BG: See results   Diet: reg  Bowel/Bladder: urinal at bedside. BS+. No BM  Drains/Devices: PIV SL  Tests/Procedures for next shift: WOC consult. MRSA nares PCR sent to lab, Blood culture pending  Anticipated DC date: TBD  Other Important Info:  Keep LLE elevated  *Xray left foot w/o evidence of osteomyelitis  *Venous US (-) for DVT

## 2024-11-15 NOTE — PLAN OF CARE
PRIMARY Concern: LLE cellulitis and LLE heel wound  SAFETY RISK Concerns (fall risk, behaviors, etc.): Falls      Aggression Tool Color: Green  Isolation/Type: None  Tests/Procedures for NEXT shift: Labs  Consults? (Pending/following, signed-off?) WOC/SW consult  Where is patient from? (Home, TCU, etc.): Group home  Other Important info for NEXT shift: Keep reminding pt to elevate LLE, hx spastic tetraplegia & spinal cord injury  Anticipated DC date & active delays: pending  ___________________________________________  SUMMARY NOTE:  Orientation/Cognitive: A&Ox4  Observation Goals (Met/ Not Met): Not met  Mobility Level/Assist Equipment: Not OOB yet, A1 pivot, turns well in bed  Antibiotics & Plan (IV/po, length of tx left): IV Vanco q12  Pain Management: Denies pain  Tele/VS/O2: VSS on RA x soft BP  ABNL Lab/BG: CRP 20.32, MRSA swab neg, US BLE DVT (-)  Diet: Reg  Bowel/Bladder: Uses urinal  Skin Concerns: BLE Edema L > R, BLE redness/blotchy, cellulitis marked  Drains/Devices: PIV SL  Patient Stated Goal for Today: Sleep    Observation goals  PRIOR TO DISCHARGE        Comments:   -diagnostic tests and consults completed and resulted not met, WOC consulted  -vital signs normal or at patient baseline met  -infection is improving not met  Nurse to notify provider when observation goals have been met and patient is ready for discharge.

## 2024-11-15 NOTE — PROGRESS NOTES
Observation goals  PRIOR TO DISCHARGE       Comments:   -diagnostic tests and consults completed and resulted not met, WOC consulted  -vital signs normal or at patient baseline : Partially met, bradycardic.   -infection is improving not met  Nurse to notify provider when observation goals have been met and patient is ready for discharge.

## 2024-11-15 NOTE — PROGRESS NOTES
PRIMARY Concern: LLE cellulitis and LLE heel wound  SAFETY RISK Concerns (fall risk, behaviors, etc.): Falls      Aggression Tool Color: Green  Isolation/Type: None  Tests/Procedures for NEXT shift: None  Consults? (Pending/following, signed-off?) WOC/SW consult  Where is patient from? (Home, TCU, etc.): Group home  Other Important info for NEXT shift: Keep reminding pt to elevate LLE, hx spastic tetraplegia & spinal cord injury, TBI  Anticipated DC date & active delays: Wheelchair ride arranged for tomorrow 11/16 between 9:37 am to 10:22 am back to Group Home.   ___________________________________________  SUMMARY NOTE:  Orientation/Cognitive: A&Ox4  Observation Goals (Met/ Not Met): Partially met, will discharge tomorrow.   Mobility Level/Assist Equipment: A2/G/W  Antibiotics & Plan (IV/po, length of tx left): IV Vanco changed today  to Ancef q 8hrs  Pain Management: Denies pain  Tele/VS/O2: VSS on RA. Bradycardiac  at times.   ABNL Lab/BG: CRP 11.95, sed Rate 28  Diet: Reg  Bowel/Bladder: Uses urinal  Skin Concerns: BLE Edema L > R, BLE redness/blotchy, cellulitis marked. Wound care done by WOC today.   Drains/Devices: PIV SL  Patient Stated Goal for Today: Rest.

## 2024-11-15 NOTE — PROGRESS NOTES
Care Management Follow Up    Length of Stay (days): 0    Expected Discharge Date: 11/16/2024     Concerns to be Addressed:       Patient plan of care discussed at interdisciplinary rounds: Yes    Anticipated Discharge Disposition: Group Home              Anticipated Discharge Services: Home Care  Anticipated Discharge DME: None    Patient/family educated on Medicare website which has current facility and service quality ratings: yes  Education Provided on the Discharge Plan: Yes  Patient/Family in Agreement with the Plan: yes    Referrals Placed by CM/SW:    Private pay costs discussed: Not applicable    Discussed  Partnership in Safe Discharge Planning  document with patient/family: No     Handoff Completed: No, handoff not indicated or clinically appropriate    Additional Information:  Writer was informed that patient can discharge today. Writer called [facility] who tells writer that they have cancelled staff and don't have adequate staff for patient today. They do as of 9:00 am tomorrow.  Writer attempted to make a wheelchair ride for today while waiting to hear back from [facility]. Wheelchair rides were out until 10:00 pm. Writer rearranged wheelchair ride for 9:37 am to 10:22 am.      Next Steps: fax discharge orders to [facility] [phone] and fax orders to [facility] care via [facility] as new wound care instructions are on them    Randi Marin RN

## 2024-11-16 VITALS
SYSTOLIC BLOOD PRESSURE: 107 MMHG | HEART RATE: 51 BPM | DIASTOLIC BLOOD PRESSURE: 57 MMHG | TEMPERATURE: 98.2 F | WEIGHT: 300 LBS | BODY MASS INDEX: 42 KG/M2 | OXYGEN SATURATION: 95 % | HEIGHT: 71 IN | RESPIRATION RATE: 18 BRPM

## 2024-11-16 LAB — PROCALCITONIN SERPL IA-MCNC: 0.05 NG/ML

## 2024-11-16 PROCEDURE — 84145 PROCALCITONIN (PCT): CPT | Performed by: PHYSICIAN ASSISTANT

## 2024-11-16 PROCEDURE — 96372 THER/PROPH/DIAG INJ SC/IM: CPT | Performed by: PHYSICIAN ASSISTANT

## 2024-11-16 PROCEDURE — 36415 COLL VENOUS BLD VENIPUNCTURE: CPT | Performed by: PHYSICIAN ASSISTANT

## 2024-11-16 PROCEDURE — 250N000011 HC RX IP 250 OP 636: Performed by: PHYSICIAN ASSISTANT

## 2024-11-16 PROCEDURE — 250N000011 HC RX IP 250 OP 636: Mod: JZ | Performed by: PHYSICIAN ASSISTANT

## 2024-11-16 PROCEDURE — 96376 TX/PRO/DX INJ SAME DRUG ADON: CPT

## 2024-11-16 PROCEDURE — 99239 HOSP IP/OBS DSCHRG MGMT >30: CPT | Performed by: STUDENT IN AN ORGANIZED HEALTH CARE EDUCATION/TRAINING PROGRAM

## 2024-11-16 PROCEDURE — G0378 HOSPITAL OBSERVATION PER HR: HCPCS

## 2024-11-16 RX ORDER — CEPHALEXIN 500 MG/1
500 CAPSULE ORAL 4 TIMES DAILY
Qty: 32 CAPSULE | Refills: 0 | Status: SHIPPED | OUTPATIENT
Start: 2024-11-16 | End: 2024-11-24

## 2024-11-16 RX ADMIN — ENOXAPARIN SODIUM 40 MG: 40 INJECTION SUBCUTANEOUS at 08:43

## 2024-11-16 RX ADMIN — CEFAZOLIN SODIUM 2 G: 2 INJECTION, SOLUTION INTRAVENOUS at 08:43

## 2024-11-16 RX ADMIN — CEFAZOLIN SODIUM 2 G: 2 INJECTION, SOLUTION INTRAVENOUS at 01:25

## 2024-11-16 ASSESSMENT — ACTIVITIES OF DAILY LIVING (ADL)
ADLS_ACUITY_SCORE: 0

## 2024-11-16 NOTE — PROGRESS NOTES
"Care Management Discharge Note    Discharge Date: 11/16/2024       Discharge Disposition: Group Home    Discharge Services: Home Care    Discharge DME: None    Discharge Transportation:      Private pay costs discussed: Not applicable    Does the patient's insurance plan have a 3 day qualifying hospital stay waiver?  No    PAS Confirmation Code:    Patient/family educated on Medicare website which has current facility and service quality ratings: yes    Education Provided on the Discharge Plan: Yes  Persons Notified of Discharge Plans:  facility, Bedside RN, Charge RN  Patient/Family in Agreement with the Plan: yes    Handoff Referral Completed: No, handoff not indicated or clinically appropriate    Additional Information:  Writer reached out to patient's Group Home \"[facility]\" and spoke with facility RN Osiris who states they can take patient back this morning. Ride set up yesterday for today with time window of 9:30-10:22. Prescriptions sent to [facility] per facility request. Charge RN updated via text. No further needs seen.      Jefferson Ocampo RN  [facility]  Inpatient Care Management - FLOAT  CM RN Mobile: [phone] daily 7:30-4:00          "

## 2024-11-16 NOTE — PROGRESS NOTES
0910 - Assumed care for patient. He has eaten breakfast. Patient's IV antibiotic is infusing. Once antibiotic is done, IV can be removed for discharge. Wheelchair ride coming between [id number] for discharge. Patient is vitally stable on room air. No complaints of pain.    0955 - Patient discharged via wheel chair transport.

## 2024-11-16 NOTE — PROGRESS NOTES
PRIMARY Concern: LLE cellulitis and LLE heel wound  SAFETY RISK Concerns (fall risk, behaviors, etc.): Falls      Aggression Tool Color: Green  Isolation/Type: None  Tests/Procedures for NEXT shift: None  Consults? (Pending/following, signed-off?) WOC/SW following  Where is patient from? (Home, TCU, etc.): Group home  Other Important info for NEXT shift: Keep reminding pt to elevate LLE, hx spastic tetraplegia & spinal cord injury, TBI  Anticipated DC date & active delays: Wheelchair ride arranged for tomorrow 11/16 between 9:37 am to 10:22 am back to Group Home.   ___________________________________________  SUMMARY NOTE:  Orientation/Cognitive: A&Ox4  Observation Goals (Met/ Not Met): Partially met, will discharge tomorrow.   Mobility Level/Assist Equipment: A2/G/W and pivot, WC bound  Antibiotics & Plan (IV/po, length of tx left): IV Vanco changed today  to Ancef q 8hrs  Pain Management: Denies pain  Tele/VS/O2: VSS on RA.   ABNL Lab/BG: CRP 11.95, sed Rate 28  Diet: Reg  Bowel/Bladder: Uses urinal  Skin Concerns: BLE Edema L > R, BLE redness/blotchy, cellulitis marked. Wound care done by WOC today. Dressing reinforced  Drains/Devices: PIV SL  Patient Stated Goal for Today: Rest.

## 2024-11-16 NOTE — PLAN OF CARE
Goal Outcome Evaluation:      Plan of Care Reviewed With: patient      PRIMARY Concern: LLE cellulitis and LLE heel wound  SAFETY RISK Concerns (fall risk, behaviors, etc.): Falls      Aggression Tool Color: Green  Isolation/Type: None  Tests/Procedures for NEXT shift: None  Consults? (Pending/following, signed-off?) WOC/SW consult  Where is patient from? (Home, TCU, etc.): Group home  Other Important info for NEXT shift: Keep reminding pt to elevate LLE, hx spastic tetraplegia & spinal cord injury, TBI  Anticipated DC date & active delays: Wheelchair ride arranged for today 11/16 between 9:37 am to 10:22 am back to Group Home.     Orientation/Cognitive: A&Ox4  Observation Goals (Met/ Not Met): Partially met, will discharge today.   Mobility Level/Assist Equipment: A2/G/W  Antibiotics & Plan (IV/po, length of tx left): IV Vanco changed  to Ancef q 8hrs  Pain Management: Denies pain  Tele/VS/O2: VSS on RA. Bradycardiac  at times.   ABNL Lab/BG: CRP 11.95, sed Rate 28  Diet: Reg  Bowel/Bladder: Uses urinal  Skin Concerns: BLE Edema L > R, BLE redness/blotchy, cellulitis marked. Wound care done by WOC yesterday.   Drains/Devices: PIV SL  Patient Stated Goal for Today: Rest.

## 2024-11-16 NOTE — DISCHARGE SUMMARY
"[facility]  Hospitalist Discharge Summary      Date of Admission:  11/14/2024  Date of Discharge:  11/16/2024  Discharging Provider: Uzair Reynolds MD  Discharge Service: Hospitalist Service    Discharge Diagnoses   LLE cellulitis  Chronic pressure injury of the left heel  History of TBI  History of spinal cord injury  Spastic tetraplegia  History of PE and DVT (2016 and 2017)    Clinically Significant Risk Factors     # Severe Obesity: Estimated body mass index is 41.86 kg/m  as calculated from the following:    Height as of this encounter: 1.803 m (5' 10.98\").    Weight as of this encounter: 136.1 kg (300 lb).       Follow-ups Needed After Discharge   Follow-up Appointments       Follow-up and recommended labs and tests       Follow up with primary care provider, Physician No Ref-Primary, within 7 days for hospital follow- up.    Follow up with wound care clinic next week and podiatry              Unresulted Labs Ordered in the Past 30 Days of this Admission       Date and Time Order Name Status Description    11/16/2024 12:01 AM Procalcitonin In process     11/14/2024 12:59 PM Blood Culture Peripheral Blood Preliminary         These results will be followed up by Hospitalist discharge pool     Discharge Disposition   Discharged to group home  Condition at discharge: Stable    Hospital Course   Jody Jenkins is a 39 year old male with PMH of hx DVT and PE, spastic tetraplegia, hx TBI, hx cervical spinal cord injury, who was admitted on 11/14/24 with LLE cellulitis. He was treated with IV cefazolin and noted significant improvement within 24 hours. Had planned for discharge on 11/15, but group home did not have staff. Will discharge with keflex to complete 10 day course of abx.      LLE cellulitis  Chronic pressure injury of the left heel  *Presents with LLE erythema, warmth, swelling x 2 days  *T 100.7 F, no leukocytosis, normal lactic acid  *Xray left foot w/o evidence of " osteomyelitis  *Venous US (-) for DVT  *Much improvement to edema and erythema after 1 day of abx  - treated with ancef while in the hospital  - MRSA nares PCR negative  - Blood culture pending  - WOC consult, wound cares noted, d/w WOCN, no concern for infection of heel, has wound care follow up as outpatient already scheduled, follows closely with podiatry  - Keep LLE elevated  - discharge with keflex to complete 10 day course     History of TBI  History of spinal cord injury  Spastic tetraplegia  *Resides in group home, bed bound at baseline but can pivot. Denies neurogenic bladder.     History of PE and DVT (2016 and 2017)  *Noted. No longer on anticoagulation.    Consultations This Hospital Stay   PHARMACY TO DOSE VANCO  CARE MANAGEMENT / SOCIAL WORK IP CONSULT  PHARMACY TO DOSE VANCO  WOUND OSTOMY CONTINENCE NURSE  IP CONSULT    Code Status   Full Code    Time Spent on this Encounter   I, Uzair Reynolds MD, personally saw the patient today and spent greater than 30 minutes discharging this patient.       Uzair Reynolds MD  [facility] EXTENDED RECOVERY AND SHORT STAY  [address] [place] [id number]  Phone: [phone]  ______________________________________________________________________    Physical Exam   Vital Signs: Temp: 98.2  F (36.8  C) Temp src: Oral BP: 107/57 Pulse: 51   Resp: 18 SpO2: 95 % O2 Device: None (Room air)    Weight: 300 lbs 0 oz  Constitutional: Awake, alert, cooperative, no apparent distress  Respiratory: Clear to auscultation bilaterally, no crackles or wheezing  Cardiovascular: Regular rate and rhythm, normal S1 and S2, and no murmur noted  GI: Normal bowel sounds, soft, non-distended, non-tender  Skin/Integumen: BLE jonathan discoloration of shins. Mild increased warmth of LLE shin. No erythema  Other:          Primary Care Physician   Physician No Ref-Primary    Discharge Orders      Reason for your hospital stay    You were admitted with left leg  cellulitis and improved with antibiotics     Follow-up and recommended labs and tests     Follow up with primary care provider, Physician No Ref-Primary, within 7 days for hospital follow- up.    Follow up with wound care clinic next week and podiatry     Activity    Your activity upon discharge: activity as tolerated     Discharge Instructions    Elevate left lower leg     Diet    Follow this diet upon discharge: Current Diet:Orders Placed This Encounter      Regular Diet Adult       Significant Results and Procedures   Most Recent 3 CBC's:  Recent Labs   Lab Test 11/15/24  1216 11/14/24  1320   WBC 8.5 8.7   HGB 13.0* 12.4*   MCV 86 86    341     Most Recent 3 BMP's:  Recent Labs   Lab Test 11/15/24  0641 11/14/24  1320    139   POTASSIUM 4.0 4.7   CHLORIDE 104 105   CO2 22 24   BUN 9.5 10.0   CR 0.83 0.79   ANIONGAP 9 10   KALYANI 8.1* 8.7*   * 141*   ,   Results for orders placed or performed during the hospital encounter of 11/14/24   US Lower Extremity Venous Duplex Left    Narrative    VENOUS ULTRASOUND LEFT LOWER EXTREMITY November 14, 2024 2:27 PM     HISTORY: Left swelling, redness, deep vein thrombosis.    COMPARISON: None.    TECHNIQUE: Color Doppler and spectral waveform analysis performed  throughout the deep veins of the left lower extremity.    FINDINGS: The left common femoral, proximal great saphenous, femoral,  and popliteal veins demonstrate normal blood flow, compression, and  augmentation. Only partial visualization of posterior tibial and  peroneal veins, though patent in the visible segments. Contralateral  right common femoral vein is patent.      Impression    IMPRESSION: Negative for deep vein thrombosis in the visualized deep  veins of the left lower extremity.    ABRAHAN RETANA MD         SYSTEM ID:  [id number]   XR Foot Left 3 Views    Narrative    FOOT LEFT THREE OR MORE VIEWS November 14, 2024 1:54 PM     HISTORY: Heel ulcer, rule out osteomyelitis.    COMPARISON: None.        Impression    IMPRESSION:  There is a prominent soft tissue defect along the plantar  aspect of the heel. The underlying calcaneus shows no cortical  destructive change or erosions to suggest osteomyelitis by radiograph.  MRI could assess further if needed. Bones are demineralized.    CANDIDO GOVEA MD         SYSTEM ID:  [id number]       Discharge Medications   Current Discharge Medication List        START taking these medications    Details   cephALEXin (KEFLEX) 500 MG capsule Take 1 capsule (500 mg) by mouth 4 times daily for 8 days.  Qty: 32 capsule, Refills: 0    Associated Diagnoses: Cellulitis of left lower extremity           CONTINUE these medications which have NOT CHANGED    Details   acetaminophen (TYLENOL) 500 MG tablet Take 500 mg by mouth every 6 hours as needed for mild pain.           Allergies   Allergies   Allergen Reactions    Sulfa Antibiotics      Pt states cannot have sulfa because it reacts with his other medications

## 2024-11-19 LAB — BACTERIA BLD CULT: NO GROWTH

## 2024-11-25 ENCOUNTER — HOSPITAL ENCOUNTER (OUTPATIENT)
Dept: WOUND CARE | Facility: CLINIC | Age: 39
Discharge: HOME | End: 2024-11-25
Attending: PODIATRIST
Payer: COMMERCIAL

## 2024-11-25 VITALS — HEART RATE: 52 BPM | TEMPERATURE: 99.1 F | DIASTOLIC BLOOD PRESSURE: 68 MMHG | SYSTOLIC BLOOD PRESSURE: 108 MMHG

## 2024-11-25 DIAGNOSIS — L89.623 PRESSURE INJURY OF LEFT HEEL, STAGE 3 (H): Primary | Chronic | ICD-10-CM

## 2024-11-25 DIAGNOSIS — Z53.9 DIAGNOSIS NOT YET DEFINED: Primary | ICD-10-CM

## 2024-11-25 PROCEDURE — 99213 OFFICE O/P EST LOW 20 MIN: CPT | Performed by: PODIATRIST

## 2024-11-25 PROCEDURE — 97602 WOUND(S) CARE NON-SELECTIVE: CPT

## 2024-11-25 RX ORDER — CEPHALEXIN 500 MG/1
500 CAPSULE ORAL 2 TIMES DAILY
Qty: 14 CAPSULE | Refills: 0 | Status: SHIPPED | OUTPATIENT
Start: 2024-11-25 | End: 2024-12-02

## 2024-11-25 NOTE — PROGRESS NOTES
[facility] [place] Progress Note    Subject: Patient was seen at wound center by myself for the first time, for his left heel. States ulcer has been present for approx 2 years. Per chart review, patient he underwent operative excision with bone cultures in march of 2022. He was noted to have osteomyelitis a that time and was treated with prolonged oral antibiotic. In sept he also had an infection with signs of osteomyelitis. A BKA was suggested, but was declined by patient and his legal guardian.   -Currently, an MRI has been recommended and ordered. It hasn't been done yet. He lives in a group home and has nurses that assist with his wound care. He uses a motorized chair for mobility. He does use his left heel for transfers.     6/24: Follows up for left foot. MRI is now scheduled for 7/2. Hasn't been completed. States things are going well with dressing changes, but states he doesn't want any debridement done. Also hasn't gotten prevalon boots, but order was placed.   7/15: Follows up for left foot. Denies pain to site. Conversation had regarding pressure, states he has the prevalon boot, but hasn't been using it. States he only puts weight on his foot for transfers. Primarily uses his wheelchair.   8/19: Follows up for left foot. Denies pain. States has had a wound vac applied and being changed three times a week. No known issues per patient. Also haven't received any questions from home health. Uses wheelchair for mobility.      9/16: Follows up for left foot. Minimally conversational today. No known issues with wound vac or changes. Per patient and aid, no concerns. Denies pain. Denies N/F/V/C/D.       10/21: Follows up for left foot. Denies pain to site. Wound vac has been on now for about 2 months. Per patient and aid, no known issues. Is being changed three times a week. States is offloading his plantar heel. Denies N/F/V/C/D.     11/25: Follows up for left foot. Patient minimally  conversational. Yoli pain to the site. Per his nurse, he states he's much weaker than he's been. States he also isn't wearing lower extremity compression. Has completed the antibiotics.     PMH:   Past Medical History:   Diagnosis Date    Adult failure to thrive 02/25/2022    COVID-19 10/19/2021    Elevated d-dimer 10/19/2021    Elevated troponin 10/19/2021    H/O febrile seizure 09/05/2013    Secondary to fever at 7 yo No subsequent seizures    History of DVT (deep vein thrombosis) 04/14/2017    History of DVT (deep vein thrombosis) December 2016, May 2017, September 2017.   Refused warfarin for treatment      History of pressure injury of skin 06/24/2018    History of pressure ulcer December 2016. Healed by April 2017.      Homeless 06/24/2018    Other acute pulmonary embolism without acute cor pulmonale (H) 10/19/2021    Pressure injury of right buttock, stage 3 (H) 07/11/2018    Sepsis (H) 07/10/2022       Social Hx:   Social History     Socioeconomic History    Marital status: Patient Declined     Spouse name: Not on file    Number of children: Not on file    Years of education: Not on file    Highest education level: Not on file   Occupational History    Not on file   Tobacco Use    Smoking status: Unknown    Smokeless tobacco: Not on file   Substance and Sexual Activity    Alcohol use: Not on file    Drug use: Not on file    Sexual activity: Not on file   Other Topics Concern    Not on file   Social History Narrative    5/2024: Lives in a Group Home     Social Drivers of Health     Financial Resource Strain: Low Risk  (10/15/2021)    [facility], [facility] and [facility]    Overall Financial Resource Strain (CARDIA)     Difficulty of Paying Living Expenses: Not hard at all   Food Insecurity: No Food Insecurity (10/15/2021)    [facility] [employer] [facility], [facility] and [facility]     Hunger Vital Sign     Worried About Running Out of Food in the Last Year: Never true     Ran Out of Food in the Last Year: Never true   Transportation Needs: No Transportation Needs (10/15/2021)    Received from [facility] and [facility], [facility]    PRAPARE - Transportation     Lack of Transportation (Medical): No     Lack of Transportation (Non-Medical): No   Physical Activity: Not on file   Stress: Not on file   Social Connections: Unknown (2/6/2023)    [facility]    Social Connections     Frequency of Communication with Friends and Family: Not on file   Interpersonal Safety: Low Risk  (11/25/2024)    Interpersonal Safety     Do you feel physically and emotionally safe where you currently live?: Yes     Within the past 12 months, have you been hit, slapped, kicked or otherwise physically hurt by someone?: No     Within the past 12 months, have you been humiliated or emotionally abused in other ways by your partner or ex-partner?: No   Housing Stability: Not on file       Surgical Hx:   Past Surgical History:   Procedure Laterality Date    DEBRIDEMENT FOOT Left 03/24/2022    ([facility])    ELBOW DEBRIDEMENT Right 07/01/2020    ([facility])       Allergies:    Allergies   Allergen Reactions    Sulfa Antibiotics      Pt states cannot have sulfa because it reacts with his other medications       Medications:   Current Outpatient Medications   Medication Sig Dispense Refill    cephALEXin (KEFLEX) 500 MG capsule Take 1 capsule (500 mg) by mouth 2 times daily for 7 days. 14 capsule 0    acetaminophen (TYLENOL) 500 MG tablet Take 500 mg by mouth every 6 hours as needed for mild pain.       No current facility-administered medications for this encounter.         Objective:  Vitals:  /68 (BP Location: Right arm, Patient Position: Left side, Cuff Size: Adult Regular)    Pulse 52   Temp 99.1  F (37.3  C) (Temporal)     A1C: None available      General:  Patient is alert and orientated.  NAD      Dermatologic: Left plantar heel ulcer: large and with depth to deep subcutaneous tissue. Wound bed fibrogranular. No significant necrosis or probe to bone. Ankle is slightly warm and edematous.   .   Wound (used by OP [facility] only) 10/06/23 0905 Left plantar heel (Active)   Thickness/Stage Stage 3 11/25/24 1409   Base granulating 11/25/24 1409   Periwound macerated;intact 11/25/24 1409   Periwound Temperature warm 11/25/24 1409   Periwound Skin Turgor firm 11/25/24 1409   Edges callused 11/25/24 1409   Length (cm) 3.8 11/25/24 1409   Width (cm) 3.7 11/25/24 1409   Depth (cm) 1.5 11/25/24 1409   Wound (cm^2) 14.06 cm^2 11/25/24 1409   Wound Volume (cm^3) 21.09 cm^3 11/25/24 1409   Wound healing % -87.47 11/25/24 1409   Drainage Characteristics/Odor serosanguineous 11/25/24 1409   Drainage Amount moderate 11/25/24 1409   Care, Wound non-select wound debridement performed. 11/25/24 1409       Vascular: DP & PT pulses are intact & regular bilaterally.  No significant edema or varicosities noted.  CFT and skin temperature is normal to both lower extremities.     Neurologic: Lower extremity sensation is absent.     Musculoskeletal: Patient is nonambulatory.      Imaging:    Right Digital brachial index: 120, index of 0.98  Left Digital Brachial index: 118, index of 0.97     Waveforms: Distal posterior tibial and dorsalis pedis waveforms are  triphasic. Both digital waveforms intact.                                                                      IMPRESSION: Normal CASSANDRA examination     I personally reviewed the images and agree with the reports as stated.     Cultures:  None     Left foot XR 11/14:   MPRESSION:  There is a prominent soft tissue defect along the plantar  aspect of the heel. The underlying calcaneus shows no cortical  destructive change or erosions to suggest osteomyelitis by  radiograph.  MRI could assess further if needed. Bones are demineralized.     I personally reviewed the images and agree with the reports as stated.  -No signs of osteomyelitis      Assessment:   Left heel ulcer   Spastic tetraplegia    Plan:    -Discussed all findings with patient. Chart and imaging reviewed.   -Wound bed with overall more viable in appearance, but patient with recent admission with cellulitis. Chart reviewed, xray without signs of osteomyelitis.   -Patient states feels better, but still weak. Still cellulitis to ankle. Keflex extended. Patient initially resistant to this, but eventually agrees.   -No debridement today as needed consent from patient's guardian who wasn't available by phone.   -Discussed need for compression.   -Cont to closely monitor.   -Follow up in 3 weeks         Mone Koehler DPM    Greater than 30 minutes were spent today, reviewing previous hospital records, counseling and educating the patient on the ongoing treatment plan and coordinating care.                 Further instructions from your care team         11/25/2024   Jody Jenkins   1985    A DME order for ABD has been placed to [facility]. If there are any issues with your order including not receiving the order please call [facility] at [phone]. They can also provide a tracking number for you.    Dressing changes outside of clinic are being performed by Home Care and Group Home  [facility] phone [phone] fax [phone] (1 day a week)  [facility]; Meg RN phone [phone] fax [phone]    Plan 11/25/2024   We will order another course of cephalexin antibiotic to your [facility] pharmacy and it will be shipped to you     Wound Dressing Change: Left Plantar Heel  - Wash your hands with soap and water before you begin your dressing change and prepare a clean surface for dressings.  -Cleanse with mild unscented soap and water (such as Cetaphil, Cerave or Dove)   -Apply a thin  "layer of zinc oxide paste (such as Desitin) to the macerated periwound skin  -Primary dressing: Apply a small amount of Iodosorb gel to gauze and ensure the gel is in contact with full base of wound  -Secondary dressing: Cover with 1/2 of a 5x9 ABD pad  Secure with 1 roll of 4\" conforming roll gauze and medipore tape  Pull up Spandigrip size F from toes to ankle and size G from ankle to knee (or velcro compression wraps)  Change daily and as needed with soiling/saturation        Compression:   Spandigrip F to feet, G to calf  OR Velcro compression wraps arrive as ordered) and can be removed at night and put back on first thing in the morning. Please remove compression dressing if toes turn blue and/or tingle and can not be relieved by raising the leg for one hour. If unable to reapply in the morning, keep compression on until next dressing change.  Whenever you sit raise your ankle above your hips to promote wound healing.     Protein:  A diet high in protein is important for wound healing, we recommend getting 90 grams of protein per day.   Taking protein shakes or bars are a good way to get extra protein in your diet. Pravin supplement 1-2 times a day.     Main Provider: Mone Koehler D.P.M. November 25, 2024    Call us at [phone] if you have any questions about your wounds, if you have redness or swelling around your wound, have a fever of 101 degrees Fahrenheit or greater or if you have any other problems or concerns. We answer the phone Monday through Friday 8 am to 4 pm, please leave a message as we check the voicemail frequently throughout the day. If you have a concern over the weekend, please leave a message and we will return your call Monday. If the need is urgent, go to the ER or urgent care.    If you had a positive experience please indicate that on your patient satisfaction survey form that [facility] will be sending you.    It was a pleasure meeting with you today.  Thank you for " allowing me and my team the privilege of caring for you today.  YOU are the reason we are here, and I truly hope we provided you with the excellent service you deserve.  Please let us know if there is anything else we can do for you so that we can be sure you are leaving completely satisfied with your care experience.      If you have any billing related questions please call the [facility] Business office at [phone]. The clinic staff does not handle billing related matters.    If you are scheduled to have a follow up appointment, you will receive a reminder call the day before your visit. On the appointment day please arrive 15 minutes prior to your appointment time. If you are unable to keep that appointment, please call the clinic to cancel or reschedule. If you are more than 10 minutes late or greater for your scheduled appointment time, the clinic policy is that you may be asked to reschedule.

## 2024-11-25 NOTE — DISCHARGE INSTRUCTIONS
"11/25/2024   Jody Jenkins   1985    A DME order for ABD has been placed to [facility]. If there are any issues with your order including not receiving the order please call [facility] at [phone]. They can also provide a tracking number for you.    Dressing changes outside of clinic are being performed by Home Care and Group Home  [facility] phone [phone] fax [phone] (1 day a week)  [facility]; Meg RN phone [phone] fax [phone]    Plan 11/25/2024   We will order another course of cephalexin antibiotic to your [facility] pharmacy and it will be shipped to you     Wound Dressing Change: Left Plantar Heel  - Wash your hands with soap and water before you begin your dressing change and prepare a clean surface for dressings.  -Cleanse with mild unscented soap and water (such as Cetaphil, Cerave or Dove)   -Apply a thin layer of zinc oxide paste (such as Desitin) to the macerated periwound skin  -Primary dressing: Apply a small amount of Iodosorb gel to gauze and ensure the gel is in contact with full base of wound  -Secondary dressing: Cover with 1/2 of a 5x9 ABD pad  Secure with 1 roll of 4\" conforming roll gauze and medipore tape  Pull up Spandigrip size F from toes to ankle and size G from ankle to knee (or velcro compression wraps)  Change daily and as needed with soiling/saturation        Compression:   Spandigrip F to feet, G to calf  OR Velcro compression wraps arrive as ordered) and can be removed at night and put back on first thing in the morning. Please remove compression dressing if toes turn blue and/or tingle and can not be relieved by raising the leg for one hour. If unable to reapply in the morning, keep compression on until next dressing change.  Whenever you sit raise your ankle above your hips to promote wound healing.     Protein:  A diet high in protein is important for wound healing, we recommend getting 90 grams of protein per day.   Taking protein " shakes or bars are a good way to get extra protein in your diet. Pravin supplement 1-2 times a day.     Main Provider: Mone Koehler D.P.M. November 25, 2024    Call us at [phone] if you have any questions about your wounds, if you have redness or swelling around your wound, have a fever of 101 degrees Fahrenheit or greater or if you have any other problems or concerns. We answer the phone Monday through Friday 8 am to 4 pm, please leave a message as we check the voicemail frequently throughout the day. If you have a concern over the weekend, please leave a message and we will return your call Monday. If the need is urgent, go to the ER or urgent care.    If you had a positive experience please indicate that on your patient satisfaction survey form that [facility] will be sending you.    It was a pleasure meeting with you today.  Thank you for allowing me and my team the privilege of caring for you today.  YOU are the reason we are here, and I truly hope we provided you with the excellent service you deserve.  Please let us know if there is anything else we can do for you so that we can be sure you are leaving completely satisfied with your care experience.      If you have any billing related questions please call the [facility] Business office at [phone]. The clinic staff does not handle billing related matters.    If you are scheduled to have a follow up appointment, you will receive a reminder call the day before your visit. On the appointment day please arrive 15 minutes prior to your appointment time. If you are unable to keep that appointment, please call the clinic to cancel or reschedule. If you are more than 10 minutes late or greater for your scheduled appointment time, the clinic policy is that you may be asked to reschedule.

## 2024-11-26 ENCOUNTER — TELEPHONE (OUTPATIENT)
Dept: WOUND CARE | Facility: CLINIC | Age: 39
End: 2024-11-26
Payer: COMMERCIAL

## 2024-11-26 NOTE — ADDENDUM NOTE
Encounter addended by: Sonja Street RN on: 11/26/2024 10:05 AM   Actions taken: Order list changed, Diagnosis association updated

## 2024-11-26 NOTE — TELEPHONE ENCOUNTER
Supply order sent to Kisha Floyd PA-C for signature. Will send to [facility] once signed.

## 2024-11-26 NOTE — TELEPHONE ENCOUNTER
Viviana with [facility] left a vm regarding the recent Rx for the patient's wound care dressings. Per Viviana, [place] not [place] an Rx from a podiatrist so in order to fulfill the order, another [place] approved provider will need to sign instead. Once that's obtained, refax to [phone].    If needed, Viviana can be reached at [phone]

## 2024-12-09 ENCOUNTER — MEDICAL CORRESPONDENCE (OUTPATIENT)
Dept: HEALTH INFORMATION MANAGEMENT | Facility: CLINIC | Age: 39
End: 2024-12-09
Payer: COMMERCIAL

## 2025-01-06 ENCOUNTER — HOSPITAL ENCOUNTER (OUTPATIENT)
Dept: WOUND CARE | Facility: CLINIC | Age: 40
Discharge: HOME | End: 2025-01-06
Attending: PODIATRIST | Admitting: PODIATRIST
Payer: COMMERCIAL

## 2025-01-06 VITALS — TEMPERATURE: 97.7 F | DIASTOLIC BLOOD PRESSURE: 53 MMHG | SYSTOLIC BLOOD PRESSURE: 116 MMHG | HEART RATE: 78 BPM

## 2025-01-06 DIAGNOSIS — L89.623 PRESSURE INJURY OF LEFT HEEL, STAGE 3 (H): Primary | Chronic | ICD-10-CM

## 2025-01-06 PROCEDURE — 11042 DBRDMT SUBQ TIS 1ST 20SQCM/<: CPT | Performed by: PODIATRIST

## 2025-01-06 NOTE — DISCHARGE INSTRUCTIONS
"01/06/2025   Jody Jenkins   1985    Plan 01/06/2025     A DME order has been placed to [facility]. If there are any issues with your order including not receiving the order please call [facility] at [phone]. They can also provide a tracking number for you.     Dressing changes outside of clinic are being performed by Home Care and Group Home  [facility] phone [phone] fax [phone] (Twice per week)  [facility]; Meg RN phone [phone] fax [phone]     Plan 1/06/2024   We will submit for Epifix skin substitute through your insurance   Offload as much as you can. If you don't offload at all you risk losing your foot. Utilize a pillow on your foot rest if you cannot use the Prevalon boots.      Wound Dressing Change: Left Plantar Heel  - Wash your hands with soap and water before you begin your dressing change and prepare a clean surface for dressings.  -Cleanse with mild unscented soap and water (such as Cetaphil, Cerave or Dove)   -Apply a thin layer of zinc oxide paste (such as Desitin) to the macerated periwound skin  -Primary dressing: Apply a small amount of Iodosorb gel to 1 4x4 gauze pad and ensure the gel is in contact with full base of wound  -Secondary dressing: Cover with 1/2 of a 5x9 ABD pad  Secure with 1 roll of 4\" conforming roll gauze and medipore tape  Pull up Spandigrip size E from toes to ankle and size G from ankle to knee (or velcro compression wraps)  Change daily and as needed with soiling/saturation     Compression:   Spandigrip F to feet, G to calf  OR Velcro compression wraps arrive as ordered) and can be removed at night and put back on first thing in the morning. Please remove compression dressing if toes turn blue and/or tingle and can not be relieved by raising the leg for one hour. If unable to reapply in the morning, keep compression on until next dressing change.  Whenever you sit raise your ankle above your hips to promote wound healing.   "   Protein:  A diet high in protein is important for wound healing, we recommend getting 90 grams of protein per day.   Taking protein shakes or bars are a good way to get extra protein in your diet. Pravin supplement 1-2 times a day.    You do not need to change the dressing on the days you are being seen at the wound clinic     Main Provider: Mone Koehler D.P.M. January 6, 2025    Call us at [phone] if you have any questions about your wounds, if you have redness or swelling around your wound, have a fever of 101 degrees Fahrenheit or greater or if you have any other problems or concerns. We answer the phone Monday through Friday 8 am to 4 pm, please leave a message as we check the voicemail frequently throughout the day. If you have a concern over the weekend, please leave a message and we will return your call Monday. If the need is urgent, go to the ER or urgent care.    If you had a positive experience please indicate that on your patient satisfaction survey form that [facility] will be sending you.    It was a pleasure meeting with you today.  Thank you for allowing me and my team the privilege of caring for you today.  YOU are the reason we are here, and I truly hope we provided you with the excellent service you deserve.  Please let us know if there is anything else we can do for you so that we can be sure you are leaving completely satisfied with your care experience.      If you have any billing related questions please call the [facility] Business office at [phone]. The clinic staff does not handle billing related matters.    If you are scheduled to have a follow up appointment, you will receive a reminder call the day before your visit. On the appointment day please arrive 15 minutes prior to your appointment time. If you are unable to keep that appointment, please call the clinic to cancel or reschedule. If you are more than 10 minutes late or greater for your scheduled appointment  time, the clinic policy is that you may be asked to reschedule.     TBA

## 2025-01-07 ENCOUNTER — TELEPHONE (OUTPATIENT)
Dept: WOUND CARE | Facility: CLINIC | Age: 40
End: 2025-01-07
Payer: COMMERCIAL

## 2025-01-07 NOTE — ADDENDUM NOTE
Encounter addended by: Sonja Street RN on: 1/7/2025 9:28 AM   Actions taken: Order list changed, Diagnosis association updated

## 2025-01-07 NOTE — TELEPHONE ENCOUNTER
Viviana Farmer called regarding the faxed order from today 1/7/25. Per insurance, it requires an MD signature.    [phone]

## 2025-01-07 NOTE — PROGRESS NOTES
[facility] [place] Progress Note    Subject: Patient was seen at wound center by myself for the first time, for his left heel. States ulcer has been present for approx 2 years. Per chart review, patient he underwent operative excision with bone cultures in march of 2022. He was noted to have osteomyelitis a that time and was treated with prolonged oral antibiotic. In sept he also had an infection with signs of osteomyelitis. A BKA was suggested, but was declined by patient and his legal guardian.   -Currently, an MRI has been recommended and ordered. It hasn't been done yet. He lives in a group home and has nurses that assist with his wound care. He uses a motorized chair for mobility. He does use his left heel for transfers.     6/24: Follows up for left foot. MRI is now scheduled for 7/2. Hasn't been completed. States things are going well with dressing changes, but states he doesn't want any debridement done. Also hasn't gotten prevalon boots, but order was placed.   7/15: Follows up for left foot. Denies pain to site. Conversation had regarding pressure, states he has the prevalon boot, but hasn't been using it. States he only puts weight on his foot for transfers. Primarily uses his wheelchair.   8/19: Follows up for left foot. Denies pain. States has had a wound vac applied and being changed three times a week. No known issues per patient. Also haven't received any questions from home health. Uses wheelchair for mobility.      9/16: Follows up for left foot. Minimally conversational today. No known issues with wound vac or changes. Per patient and aid, no concerns. Denies pain. Denies N/F/V/C/D.       10/21: Follows up for left foot. Denies pain to site. Wound vac has been on now for about 2 months. Per patient and aid, no known issues. Is being changed three times a week. States is offloading his plantar heel. Denies N/F/V/C/D.      11/25: Follows up for left foot. Patient minimally  conversational. Yoli pain to the site. Per his nurse, he states he's much weaker than he's been. States he also isn't wearing lower extremity compression. Has completed the antibiotics.    1/6: Follows up for his left foot. Denies pain. Minimally conversational. His aid states that he spends the majority of his time with his foot down on the ground (approx 8 hours) and doesn't offload at all. He states that he can't fit the prevalon boot on his foot plate.     PMH:   Past Medical History:   Diagnosis Date    Adult failure to thrive 02/25/2022    COVID-19 10/19/2021    Elevated d-dimer 10/19/2021    Elevated troponin 10/19/2021    H/O febrile seizure 09/05/2013    Secondary to fever at 5 yo No subsequent seizures    History of DVT (deep vein thrombosis) 04/14/2017    History of DVT (deep vein thrombosis) December 2016, May 2017, September 2017.   Refused warfarin for treatment      History of pressure injury of skin 06/24/2018    History of pressure ulcer December 2016. Healed by April 2017.      Homeless 06/24/2018    Other acute pulmonary embolism without acute cor pulmonale (H) 10/19/2021    Pressure injury of right buttock, stage 3 (H) 07/11/2018    Sepsis (H) 07/10/2022       Social Hx:   Social History     Socioeconomic History    Marital status: Patient Declined     Spouse name: Not on file    Number of children: Not on file    Years of education: Not on file    Highest education level: Not on file   Occupational History    Not on file   Tobacco Use    Smoking status: Unknown    Smokeless tobacco: Not on file   Substance and Sexual Activity    Alcohol use: Not on file    Drug use: Not on file    Sexual activity: Not on file   Other Topics Concern    Not on file   Social History Narrative    5/2024: Lives in a Group Home     Social Drivers of Health     Financial Resource Strain: Low Risk  (10/15/2021)    Received [facility] [place] [facility] [employer], [facility] [place] [facility] [employer]    Overall Financial Resource Strain (CARDIA)     Difficulty of Paying Living Expenses: Not hard at all   Food Insecurity: No Food Insecurity (10/15/2021)    Received from [facility], [facility] and [facility]    Hunger Vital Sign     Worried About Running Out of Food in the Last Year: Never true     Ran Out of Food in the Last Year: Never true   Transportation Needs: No Transportation Needs (10/15/2021)    Received from [facility], [facility] and [facility]    PRAPARE - Transportation     Lack of Transportation (Medical): No     Lack of Transportation (Non-Medical): No   Physical Activity: Not on file   Stress: Not on file   Social Connections: Unknown (2/6/2023)    Received from [employer] [facility], [facility] & [facility]    Social Connections     Frequency of Communication with Friends and Family: Not on file   Interpersonal Safety: Low Risk  (1/6/2025)    Interpersonal Safety     Do you feel physically and emotionally safe where you currently live?: Yes     Within the past 12 months, have you been hit, slapped, kicked or otherwise physically hurt by someone?: No     Within the past 12 months, have you been humiliated or emotionally abused in other ways by your partner or ex-partner?: No   Housing Stability: Not on file       Surgical Hx:   Past Surgical History:   Procedure Laterality Date    DEBRIDEMENT FOOT Left 03/24/2022    ()    ELBOW DEBRIDEMENT Right 07/01/2020    ()       Allergies:    Allergies   Allergen Reactions    Sulfa Antibiotics      Pt states cannot have sulfa because it reacts with his other medications       Medications:   Current Outpatient Medications   Medication Sig Dispense Refill    acetaminophen (TYLENOL) 500 MG tablet Take 500 mg by mouth every 6 hours as needed for mild pain.       No current  facility-administered medications for this encounter.         Objective:  Vitals:  /53 (BP Location: Left arm, Patient Position: Right side)   Pulse 78   Temp 97.7  F (36.5  C) (Temporal)     A1C: None available      General:  Patient is alert and orientated.  NAD      Dermatologic: Left plantar heel ulcer: large and with depth to deep subcutaneous tissue. Wound bed fibrogranular. No significant necrosis or probe to bone. Ankle is slightly warm and edematous. --> wound bed improved today. More granular. No cellulitis,    .   Wound (used by OP [facility] only) 10/06/23 0905 Left plantar heel (Active)   Thickness/Stage Stage 3 01/06/25 1250   Base granulating 01/06/25 1250   Periwound intact;edematous 01/06/25 1250   Periwound Temperature warm 01/06/25 1250   Periwound Skin Turgor firm 01/06/25 1250   Edges callused 01/06/25 1250   Length (cm) 4 01/06/25 1250   Width (cm) 4.2 01/06/25 1250   Depth (cm) 1.5 01/06/25 1250   Wound (cm^2) 16.8 cm^2 01/06/25 1250   Wound Volume (cm^3) 25.2 cm^3 01/06/25 1250   Wound healing % -124 01/06/25 1250   Drainage Characteristics/Odor serosanguineous 01/06/25 1250   Drainage Amount moderate 01/06/25 1250   Care, Wound debrided 01/06/25 1250          Vascular: DP & PT pulses are intact & regular bilaterally.  No significant edema or varicosities noted.  CFT and skin temperature is normal to both lower extremities.     Neurologic: Lower extremity sensation is absent.     Musculoskeletal: Patient is nonambulatory.      Imaging:    Right Digital brachial index: 120, index of 0.98  Left Digital Brachial index: 118, index of 0.97     Waveforms: Distal posterior tibial and dorsalis pedis waveforms are  triphasic. Both digital waveforms intact.                                                                      IMPRESSION: Normal CASSANDRA examination     I personally reviewed the images and agree with the reports as stated    Left foot XR 11/14:   MPRESSION:  There is a prominent soft tissue  defect along the plantar  aspect of the heel. The underlying calcaneus shows no cortical  destructive change or erosions to suggest osteomyelitis by radiograph.  MRI could assess further if needed. Bones are demineralized.     I personally reviewed the images and agree with the reports as stated.  -No signs of osteomyelitis      Assessment:   Left heel ulcer --> getting larger. Healthier wound bed.   Spastic tetraplegia    Plan:    -Discussed all findings with patient. Chart and imaging reviewed.   -Discussed with patient and aid that wound bed has been getting larger and there hasn't been significant improvement.   -Stressed need for offloading and not being in wheelchair with foot down for 8 hours a day.     -Given overall slow healing process, will apply for epifix skin sub to try to assist in healing. Wound bed has been quite resistant to all other wound care that has been done so far (wound vac, collagen, hydrofera, frequent debridements.)     -Will monitor for improvement closely with applications. If site still doesn't improve, may be better served with palliative type wound care.     -Excisional debridement done to left side as below.      Procedure:  After verbal consent, per protocol lidocaine was applied to the wound. Excisional debridement was performed on ulcer.   #15 blade was used to debride ulcer down to and including subcutaneous tissue, on the right foot. Bleeding controlled with light pressure.  No drainage noted.   < 20sq cm debrided.  Dry dressing applied to foot.  Patient tolerated procedure well.    Mone Koehler DPM            Further instructions from your care team         01/06/2025   Jody Jenkins   1985    Plan 01/06/2025     A DME order has been placed to [facility]. If there are any issues with your order including not receiving the order please call [facility] at [phone]. They can also provide a tracking number for you.     Dressing changes outside of clinic are  "being performed by Home Care and Group Home  [facility] phone [phone] fax [phone] (Twice per week)  [facility]; Meg RN phone [phone] fax [phone]     Plan 1/06/2024   We will submit for Epifix skin substitute through your insurance   Offload as much as you can. If you don't offload at all you risk losing your foot. Utilize a pillow on your foot rest if you cannot use the Prevalon boots.      Wound Dressing Change: Left Plantar Heel  - Wash your hands with soap and water before you begin your dressing change and prepare a clean surface for dressings.  -Cleanse with mild unscented soap and water (such as Cetaphil, Cerave or Dove)   -Apply a thin layer of zinc oxide paste (such as Desitin) to the macerated periwound skin  -Primary dressing: Apply a small amount of Iodosorb gel to 1 4x4 gauze pad and ensure the gel is in contact with full base of wound  -Secondary dressing: Cover with 1/2 of a 5x9 ABD pad  Secure with 1 roll of 4\" conforming roll gauze and medipore tape  Pull up Spandigrip size E from toes to ankle and size G from ankle to knee (or velcro compression wraps)  Change daily and as needed with soiling/saturation     Compression:   Spandigrip F to feet, G to calf  OR Velcro compression wraps arrive as ordered) and can be removed at night and put back on first thing in the morning. Please remove compression dressing if toes turn blue and/or tingle and can not be relieved by raising the leg for one hour. If unable to reapply in the morning, keep compression on until next dressing change.  Whenever you sit raise your ankle above your hips to promote wound healing.     Protein:  A diet high in protein is important for wound healing, we recommend getting 90 grams of protein per day.   Taking protein shakes or bars are a good way to get extra protein in your diet. Pravin supplement 1-2 times a day.    You do not need to change the dressing on the days you are being seen at the " wound clinic     Main Provider: Mone Koehler D.P.M. January 6, 2025    Call us at [phone] if you have any questions about your wounds, if you have redness or swelling around your wound, have a fever of 101 degrees Fahrenheit or greater or if you have any other problems or concerns. We answer the phone Monday through Friday 8 am to 4 pm, please leave a message as we check the voicemail frequently throughout the day. If you have a concern over the weekend, please leave a message and we will return your call Monday. If the need is urgent, go to the ER or urgent care.    If you had a positive experience please indicate that on your patient satisfaction survey form that [facility] will be sending you.    It was a pleasure meeting with you today.  Thank you for allowing me and my team the privilege of caring for you today.  YOU are the reason we are here, and I truly hope we provided you with the excellent service you deserve.  Please let us know if there is anything else we can do for you so that we can be sure you are leaving completely satisfied with your care experience.      If you have any billing related questions please call the [facility] Business office at [phone]. The clinic staff does not handle billing related matters.    If you are scheduled to have a follow up appointment, you will receive a reminder call the day before your visit. On the appointment day please arrive 15 minutes prior to your appointment time. If you are unable to keep that appointment, please call the clinic to cancel or reschedule. If you are more than 10 minutes late or greater for your scheduled appointment time, the clinic policy is that you may be asked to reschedule.

## 2025-01-13 ENCOUNTER — MEDICAL CORRESPONDENCE (OUTPATIENT)
Dept: HEALTH INFORMATION MANAGEMENT | Facility: CLINIC | Age: 40
End: 2025-01-13
Payer: COMMERCIAL

## 2025-01-29 ENCOUNTER — MEDICAL CORRESPONDENCE (OUTPATIENT)
Dept: HEALTH INFORMATION MANAGEMENT | Facility: CLINIC | Age: 40
End: 2025-01-29

## 2025-01-29 DIAGNOSIS — Z53.9 DIAGNOSIS NOT YET DEFINED: Primary | ICD-10-CM

## 2025-02-03 ENCOUNTER — HOSPITAL ENCOUNTER (OUTPATIENT)
Dept: WOUND CARE | Facility: CLINIC | Age: 40
Discharge: HOME | End: 2025-02-03
Attending: PODIATRIST | Admitting: PODIATRIST
Payer: COMMERCIAL

## 2025-02-03 ENCOUNTER — TELEPHONE (OUTPATIENT)
Dept: WOUND CARE | Facility: CLINIC | Age: 40
End: 2025-02-03

## 2025-02-03 VITALS
RESPIRATION RATE: 16 BRPM | HEART RATE: 59 BPM | SYSTOLIC BLOOD PRESSURE: 128 MMHG | TEMPERATURE: 98.8 F | DIASTOLIC BLOOD PRESSURE: 72 MMHG

## 2025-02-03 DIAGNOSIS — R60.0 BILATERAL LEG EDEMA: ICD-10-CM

## 2025-02-03 DIAGNOSIS — L89.623 PRESSURE INJURY OF LEFT HEEL, STAGE 3 (H): Primary | Chronic | ICD-10-CM

## 2025-02-03 PROCEDURE — 11042 DBRDMT SUBQ TIS 1ST 20SQCM/<: CPT | Performed by: PODIATRIST

## 2025-02-03 PROCEDURE — 99213 OFFICE O/P EST LOW 20 MIN: CPT | Mod: 25 | Performed by: PODIATRIST

## 2025-02-03 PROCEDURE — 11042 DBRDMT SUBQ TIS 1ST 20SQCM/<: CPT | Mod: LT | Performed by: PODIATRIST

## 2025-02-03 NOTE — ADDENDUM NOTE
Encounter addended by: Sonja Street RN on: 2/3/2025 1:43 PM   Actions taken: Order list changed, Diagnosis association updated

## 2025-02-03 NOTE — PROGRESS NOTES
[facility] [place] Progress Note    Subject: Patient was seen at wound center by myself for the first time, for his left heel. States ulcer has been present for approx 2 years. Per chart review, patient he underwent operative excision with bone cultures in march of 2022. He was noted to have osteomyelitis a that time and was treated with prolonged oral antibiotic. In sept he also had an infection with signs of osteomyelitis. A BKA was suggested, but was declined by patient and his legal guardian.   -Currently, an MRI has been recommended and ordered. It hasn't been done yet. He lives in a group home and has nurses that assist with his wound care. He uses a motorized chair for mobility. He does use his left heel for transfers.     6/24: Follows up for left foot. MRI is now scheduled for 7/2. Hasn't been completed. States things are going well with dressing changes, but states he doesn't want any debridement done. Also hasn't gotten prevalon boots, but order was placed.   7/15: Follows up for left foot. Denies pain to site. Conversation had regarding pressure, states he has the prevalon boot, but hasn't been using it. States he only puts weight on his foot for transfers. Primarily uses his wheelchair.   8/19: Follows up for left foot. Denies pain. States has had a wound vac applied and being changed three times a week. No known issues per patient. Also haven't received any questions from home health. Uses wheelchair for mobility.      9/16: Follows up for left foot. Minimally conversational today. No known issues with wound vac or changes. Per patient and aid, no concerns. Denies pain. Denies N/F/V/C/D.       10/21: Follows up for left foot. Denies pain to site. Wound vac has been on now for about 2 months. Per patient and aid, no known issues. Is being changed three times a week. States is offloading his plantar heel. Denies N/F/V/C/D.      11/25: Follows up for left foot. Patient minimally  conversational. Yoli pain to the site. Per his nurse, he states he's much weaker than he's been. States he also isn't wearing lower extremity compression. Has completed the antibiotics.     1/6: Follows up for his left foot. Denies pain. Minimally conversational. His aid states that he spends the majority of his time with his foot down on the ground (approx 8 hours) and doesn't offload at all. He states that he can't fit the prevalon boot on his foot plate.     2/3: Follows up for his left foot. No significant concerns. Does have an area that's bleeding from his toenail. States he tried cutting his nails and cut them too short. Denies other concerns.     PMH:   Past Medical History:   Diagnosis Date    Adult failure to thrive 02/25/2022    COVID-19 10/19/2021    Elevated d-dimer 10/19/2021    Elevated troponin 10/19/2021    H/O febrile seizure 09/05/2013    Secondary to fever at 5 yo No subsequent seizures    History of DVT (deep vein thrombosis) 04/14/2017    History of DVT (deep vein thrombosis) December 2016, May 2017, September 2017.   Refused warfarin for treatment      History of pressure injury of skin 06/24/2018    History of pressure ulcer December 2016. Healed by April 2017.      Homeless 06/24/2018    Other acute pulmonary embolism without acute cor pulmonale (H) 10/19/2021    Pressure injury of right buttock, stage 3 (H) 07/11/2018    Sepsis (H) 07/10/2022       Social Hx:   Social History     Socioeconomic History    Marital status: Patient Declined     Spouse name: Not on file    Number of children: Not on file    Years of education: Not on file    Highest education level: Not on file   Occupational History    Not on file   Tobacco Use    Smoking status: Unknown    Smokeless tobacco: Not on file   Substance and Sexual Activity    Alcohol use: Not on file    Drug use: Not on file    Sexual activity: Not on file   Other Topics Concern    Not on file   Social History Narrative    5/2024: Lives in a  Group Home     Social Drivers of Health     Financial Resource Strain: Low Risk  (10/15/2021)    [facility]    Overall Financial Resource Strain (CARDIA)     Difficulty of Paying Living Expenses: Not hard at all   Food Insecurity: No Food Insecurity (10/15/2021)    Received from [facility]    Hunger Vital Sign     Worried About Running Out of Food in the Last Year: Never true     Ran Out of Food in the Last Year: Never true   Transportation Needs: No Transportation Needs (10/15/2021)    Received from [facility], [facility]    PRAPARE - Transportation     Lack of Transportation (Medical): No     Lack of Transportation (Non-Medical): No   Physical Activity: Not on file   Stress: Not on file   Social Connections: Unknown (2/6/2023)    [facility]    Social Connections     Frequency of Communication with Friends and Family: Not on file   Interpersonal Safety: Low Risk  (2/3/2025)    Interpersonal Safety     Do you feel physically and emotionally safe where you currently live?: Yes     Within the past 12 months, have you been hit, slapped, kicked or otherwise physically hurt by someone?: No     Within the past 12 months, have you been humiliated or emotionally abused in other ways by your partner or ex-partner?: No   Housing Stability: Not on file       Surgical Hx:   Past Surgical History:   Procedure Laterality Date    DEBRIDEMENT FOOT Left 03/24/2022    ([facility])    ELBOW DEBRIDEMENT Right 07/01/2020    ([facility])       Allergies:    Allergies   Allergen Reactions    Sulfa Antibiotics      Pt states cannot have sulfa because it reacts with his other medications        Medications:   Current Outpatient Medications   Medication Sig Dispense Refill    acetaminophen (TYLENOL) 500 MG tablet Take 500 mg by mouth every 6 hours as needed for mild pain.       No current facility-administered medications for this encounter.         Objective:  Vitals:  /72 (BP Location: Left arm)   Pulse 59   Temp 98.8  F (37.1  C) (Temporal)   Resp 16     A1C: None available      General:  Patient is alert and orientated.  NAD      Dermatologic: Left plantar heel ulcer: large and with depth to deep subcutaneous tissue. Wound bed fibrogranular. No significant necrosis or probe to bone. Wound bed measured as below, remains large.   Third toe nail: area of subungual bleeding. No cellulitis, open lesions or signs of infection.        .   Wound (used by OP [facility] only) 10/06/23 0905 Left plantar heel (Active)   Thickness/Stage Stage 3 02/03/25 1205   Base granulating 02/03/25 1205   Periwound intact;edematous 02/03/25 1205   Periwound Temperature warm 02/03/25 1205   Periwound Skin Turgor firm 02/03/25 1205   Edges callused 02/03/25 1205   Length (cm) 4.4 02/03/25 1205   Width (cm) 4.2 02/03/25 1205   Depth (cm) 1.8 02/03/25 1205   Wound (cm^2) 18.48 cm^2 02/03/25 1205   Wound Volume (cm^3) 33.26 cm^3 02/03/25 1205   Wound healing % -146.4 02/03/25 1205   Drainage Characteristics/Odor serosanguineous 02/03/25 1205   Drainage Amount moderate 02/03/25 1205   Care, Wound debrided 02/03/25 1205          Vascular: DP & PT pulses are intact & regular bilaterally.  No significant edema or varicosities noted.  CFT and skin temperature is normal to both lower extremities.     Neurologic: Lower extremity sensation is absent.     Musculoskeletal: Patient is nonambulatory.     Imaging:    Right Digital brachial index: 120, index of 0.98  Left Digital Brachial index: 118, index of 0.97     Waveforms: Distal posterior tibial and dorsalis pedis waveforms are  triphasic. Both digital waveforms intact.                                                                       IMPRESSION: Normal CASSANDRA examination     I personally reviewed the images and agree with the reports as stated     Left foot XR 11/14:   MPRESSION:  There is a prominent soft tissue defect along the plantar  aspect of the heel. The underlying calcaneus shows no cortical  destructive change or erosions to suggest osteomyelitis by radiograph.  MRI could assess further if needed. Bones are demineralized.     I personally reviewed the images and agree with the reports as stated.  -No signs of osteomyelitis      Assessment:   Left heel ulcer --> getting larger. Healthier wound bed.   Lower extremity edema   Spastic tetraplegia       Plan:    -Discussed all findings with patient. Chart and imaging reviewed.     -Discussed with patient and aid that wound bed has been getting larger and there hasn't been significant improvement.   -Stressed need for offloading and not being in wheelchair with foot down for 8 hours a day. --> continued this conversation    -Further conversation had regarding skin sub applications. Given increasing size, recommendation for therakin ovear epifix. This will fit more appropriately and has more thickness and structural integrity.     -Will order left foot xray to rule out osteomyelitis. No signs of infection.     -Discussed edema. Previously have discussed edemawear or spandage. Patient and aid state these are too difficulty to apply. Order applied for velcro wrap compression. Measurements obtained by nursing.        -Excisional debridement done to left side as below.    Procedure:  After verbal consent, per protocol lidocaine was applied to the wound. Excisional debridement was performed on ulcer.   #15 blade was used to debride ulcer down to and including subcutaneous tissue, on the left foot. Bleeding controlled with light pressure.  No drainage noted.   < 20sq cm debrided.  Dry dressing applied to foot.  Patient tolerated procedure  "well.    Mone Koehler DPM              Further instructions from your care team         02/03/2025   Jody Jenkins   1985    A DME order for supplies has been placed to [facility]. If there are any issues with your order including not receiving the order please call [facility] at [phone]. They can also provide a tracking number for you.    Dressing changes outside of clinic are being performed by Home Care and Group Home  [facility] phone [phone] fax [phone] (Twice per week)  [facility]; Meg RN phone [phone] fax [phone]     Plan 2/03/2025   We will submit for Theraskin skin substitute through your insurance.  Please call the scheduling  to schedule your x-ray appointment at [facility]: [phone]  Offload as much as you can. If you don't offload at all you risk losing your foot. Utilize a pillow on your foot rest if you cannot use the Prevalon boots.   Will place order for Velcro compression wrap. Measured you today. Order sent to [facility].     Wound Dressing Change: Left Plantar Heel  - Wash your hands with soap and water before you begin your dressing change and prepare a clean surface for dressings.  -Cleanse with mild unscented soap and water (such as Cetaphil, Cerave or Dove)   -Apply a thin layer of zinc oxide paste (such as Desitin) to the macerated periwound skin  -Primary dressing: Apply a small amount of Iodosorb gel to 1 4x4 gauze pad and ensure the gel is in contact with full base of wound  -Secondary dressing: Cover with 1/2 of a 5x9 ABD pad  Secure with 1 roll of 4\" conforming roll gauze and medipore tape  Pull up Spandigrip size E from toes to ankle and size G from ankle to knee (or velcro compression wraps)  Change daily and as needed with soiling/saturation     Compression:   Spandigrip F to feet, G to calf  OR Velcro compression wraps arrive as ordered) and can be removed at night and put back on first thing in the " morning. Please remove compression dressing if toes turn blue and/or tingle and can not be relieved by raising the leg for one hour. If unable to reapply in the morning, keep compression on until next dressing change.  Whenever you sit raise your ankle above your hips to promote wound healing.     Protein:  A diet high in protein is important for wound healing, we recommend getting 90 grams of protein per day.   Taking protein shakes or bars are a good way to get extra protein in your diet. Pravin supplement 1-2 times a day.     You do not need to change the dressing on the days you are being seen at the wound clinic     Main Provider: Mone Koehler D.P.M. February 3, 2025    Call us at [phone] if you have any questions about your wounds, if you have redness or swelling around your wound, have a fever of 101 degrees Fahrenheit or greater or if you have any other problems or concerns. We answer the phone Monday through Friday 8 am to 4 pm, please leave a message as we check the voicemail frequently throughout the day. If you have a concern over the weekend, please leave a message and we will return your call Monday. If the need is urgent, go to the ER or urgent care.    If you had a positive experience please indicate that on your patient satisfaction survey form that [facility] will be sending you.    It was a pleasure meeting with you today.  Thank you for allowing me and my team the privilege of caring for you today.  YOU are the reason we are here, and I truly hope we provided you with the excellent service you deserve.  Please let us know if there is anything else we can do for you so that we can be sure you are leaving completely satisfied with your care experience.      If you have any billing related questions please call the [facility] Business office at [phone]. The clinic staff does not handle billing related matters.    If you are scheduled to have a follow up appointment, you will  receive a reminder call the day before your visit. On the appointment day please arrive 15 minutes prior to your appointment time. If you are unable to keep that appointment, please call the clinic to cancel or reschedule. If you are more than 10 minutes late or greater for your scheduled appointment time, the clinic policy is that you may be asked to reschedule.

## 2025-02-03 NOTE — DISCHARGE INSTRUCTIONS
"02/03/2025   Jody Jenkins   1985    A DME order for supplies has been placed to [facility]. If there are any issues with your order including not receiving the order please call [facility] at [phone]. They can also provide a tracking number for you.    Dressing changes outside of clinic are being performed by Home Care and Group Home  [facility] phone [phone] fax [phone] (Twice per week)  [facility]; Meg RN phone [phone] fax [phone]     Plan 2/03/2025   We will submit for Theraskin skin substitute through your insurance.  Please call the scheduling  to schedule your x-ray appointment at [facility]: [phone]  Offload as much as you can. If you don't offload at all you risk losing your foot. Utilize a pillow on your foot rest if you cannot use the Prevalon boots.   Will place order for Velcro compression wrap. Measured you today. Order sent to [facility].     Wound Dressing Change: Left Plantar Heel  - Wash your hands with soap and water before you begin your dressing change and prepare a clean surface for dressings.  -Cleanse with mild unscented soap and water (such as Cetaphil, Cerave or Dove)   -Apply a thin layer of zinc oxide paste (such as Desitin) to the macerated periwound skin  -Primary dressing: Apply a small amount of Iodosorb gel to 1 4x4 gauze pad and ensure the gel is in contact with full base of wound  -Secondary dressing: Cover with 1/2 of a 5x9 ABD pad  Secure with 1 roll of 4\" conforming roll gauze and medipore tape  Pull up Spandigrip size E from toes to ankle and size G from ankle to knee (or velcro compression wraps)  Change daily and as needed with soiling/saturation     Compression:   Spandigrip F to feet, G to calf  OR Velcro compression wraps arrive as ordered) and can be removed at night and put back on first thing in the morning. Please remove compression dressing if toes turn blue and/or tingle and can not be " relieved by raising the leg for one hour. If unable to reapply in the morning, keep compression on until next dressing change.  Whenever you sit raise your ankle above your hips to promote wound healing.     Protein:  A diet high in protein is important for wound healing, we recommend getting 90 grams of protein per day.   Taking protein shakes or bars are a good way to get extra protein in your diet. Pravin supplement 1-2 times a day.     You do not need to change the dressing on the days you are being seen at the wound clinic     Main Provider: Mone Koehler D.P.M. February 3, 2025    Call us at [phone] if you have any questions about your wounds, if you have redness or swelling around your wound, have a fever of 101 degrees Fahrenheit or greater or if you have any other problems or concerns. We answer the phone Monday through Friday 8 am to 4 pm, please leave a message as we check the voicemail frequently throughout the day. If you have a concern over the weekend, please leave a message and we will return your call Monday. If the need is urgent, go to the ER or urgent care.    If you had a positive experience please indicate that on your patient satisfaction survey form that [facility] will be sending you.    It was a pleasure meeting with you today.  Thank you for allowing me and my team the privilege of caring for you today.  YOU are the reason we are here, and I truly hope we provided you with the excellent service you deserve.  Please let us know if there is anything else we can do for you so that we can be sure you are leaving completely satisfied with your care experience.      If you have any billing related questions please call the [facility] Business office at [phone]. The clinic staff does not handle billing related matters.    If you are scheduled to have a follow up appointment, you will receive a reminder call the day before your visit. On the appointment day please arrive 15  minutes prior to your appointment time. If you are unable to keep that appointment, please call the clinic to cancel or reschedule. If you are more than 10 minutes late or greater for your scheduled appointment time, the clinic policy is that you may be asked to reschedule.

## 2025-02-03 NOTE — ADDENDUM NOTE
Encounter addended by: Sonja Street RN on: 2/3/2025 4:02 PM   Actions taken: Order list changed, Diagnosis association updated

## 2025-02-04 NOTE — TELEPHONE ENCOUNTER
Fax received from [email or web address] that the Theraskin has been approved. Appointment notes updated. Dr. Koehler informed. The patient needs to have the xray ordered 2/3/25 completed prior to the Theraskin being applied. Meg, the patients group home RN, called to discuss upcoming appointments and confirm with her that the xray will be done prior to 2/17/25.

## 2025-02-05 ENCOUNTER — MEDICAL CORRESPONDENCE (OUTPATIENT)
Dept: HEALTH INFORMATION MANAGEMENT | Facility: CLINIC | Age: 40
End: 2025-02-05
Payer: COMMERCIAL

## 2025-02-24 ENCOUNTER — TELEPHONE (OUTPATIENT)
Dept: WOUND CARE | Facility: CLINIC | Age: 40
End: 2025-02-24
Payer: COMMERCIAL

## 2025-02-24 NOTE — TELEPHONE ENCOUNTER
Returned call to Meg and informed her that Dr. Koehler is able to see the xray that is on the patients chart. Meg reported she had asked a copy be provided to the patients PCP but since the call she [facility] and [facility] that way. No further questions or concerns.

## 2025-02-24 NOTE — TELEPHONE ENCOUNTER
[facility] VASCULAR [facility]    Who is the name of the provider?:  Rodo    What is the location you see this provider at/preferred location?: [facility] [place] [facility] [place]  Person calling / Facility: [facility]  Phone number:  [phone]   Nurse call back needed:  YES   Can we leave a detailed message on this number?  YES      Reason for call:  Requesting a call to confirm that I has received the x-ray done at [facility] on 2/20/25 and requesting a copy be sent to patient's PCP.    Pharmacy location:  NA

## 2025-03-03 ENCOUNTER — HOSPITAL ENCOUNTER (OUTPATIENT)
Dept: WOUND CARE | Facility: CLINIC | Age: 40
Discharge: HOME | End: 2025-03-03
Attending: PODIATRIST | Admitting: PODIATRIST
Payer: COMMERCIAL

## 2025-03-03 VITALS — DIASTOLIC BLOOD PRESSURE: 55 MMHG | HEART RATE: 88 BPM | TEMPERATURE: 98 F | SYSTOLIC BLOOD PRESSURE: 101 MMHG

## 2025-03-03 DIAGNOSIS — L97.322 NON-HEALING ULCER OF LEFT ANKLE WITH FAT LAYER EXPOSED (H): ICD-10-CM

## 2025-03-03 DIAGNOSIS — L89.623 PRESSURE INJURY OF LEFT HEEL, STAGE 3 (H): Primary | Chronic | ICD-10-CM

## 2025-03-03 PROCEDURE — 11042 DBRDMT SUBQ TIS 1ST 20SQCM/<: CPT | Performed by: PODIATRIST

## 2025-03-03 RX ORDER — METRONIDAZOLE 500 MG/1
500 TABLET ORAL DAILY
Qty: 21 TABLET | Refills: 0 | Status: SHIPPED | OUTPATIENT
Start: 2025-03-03 | End: 2025-03-24

## 2025-03-03 NOTE — DISCHARGE INSTRUCTIONS
"03/03/2025   Jody Jenkins   1985    A DME order for supplies has been placed to [facility] for 4x4 Mepilex foam dressing only. Has enough Fibracol and Iodosorb and dry gauzes. If there are any issues with your order including not receiving the order please call [facility] at [phone]. They can also provide a tracking number for you.     Dressing changes outside of clinic are being performed by Home Care and Group Home  [facility] phone [phone] fax [phone] (Twice per week)  [facility]; Meg RN phone [phone] fax [phone]     Plan 03/03/25   Theraskin skin substitute NOT placed; consider placement on next visit 3/24/25  Done x-ray   Offload as much as you can. If you don't offload at all, you risk losing your foot; Utilize a pillow on your foot rest if you cannot use the Prevalon boots.   Done Velcro compression wrap foot/calf pieces     Wound Dressing Change: Left Plantar Heel  - Wash your hands with soap and water before you begin your dressing change and prepare a clean surface for dressings.  -Cleanse with mild unscented soap and water (such as Cetaphil, Cerave or Dove)   -Apply a thin layer of zinc oxide paste (such as Desitin) to the macerated periwound skin  -Apply small amount of VASHE on gauze, lay into wound bed, let sit for 10 minutes, remove gauze (do not rinse)   -Sprinkle finely crushed flagyl tablet topically onto wound. You may purchase a pill  (can be found at a pharmacy or online) or crush by placing in a doubled plastic bag and crush using a rolling pin.    -Primary dressing: Apply a small amount of Iodosorb gel to 1 4x4 gauze pad and ensure the gel is in contact with full base of wound  -Secondary dressing: Cover with 1/2 of a 5x9 ABD pad  Secure with 1 roll of 4\" conforming roll gauze and medipore tape  Pull up Spandigrip size E from toes to ankle and size G from ankle to knee (or velcro compression wraps)  Change daily and as needed with " soiling/saturation    Wound Dressing Change: Left anterior ankle  - Wash your hands with soap and water before you begin your dressing change and prepare a clean surface for dressings.  -Cleanse with mild unscented soap and water (such as Cetaphil, Cerave or Dove)   -PRN Apply a thin layer of zinc oxide paste (such as Desitin) to the macerated periwound skin  -Primary dressing: Apply a small piece of Fibracol directly to wound bed  -Secondary dressing: Cover with one 4x4 silicone foam border dressing such as Mepilex or similar  Pull up Spandigrip size E from toes to ankle and size G from ankle to knee (or velcro compression wraps)  provider wants to continue with Velcro compression pieces however must diligently observe for worsening of L anterior ankle in which case you can go back to Spandigrip size F to foot and ankle  Change three times a week and as needed with soiling/saturation     Compression: provider wants to continue with Velcro compression pieces however must diligently observe for worsening of L anterior ankle in which case you can go back to Spandigrip size F to foot and ankle  Spandigrip size F to feet/ankle, Spandigrip size G to ankle/calf  OR Velcro compression wraps foot and calf pieces and can be removed at night and put back on first thing in the morning.   Please remove compression dressing if toes turn blue and/or tingle and can not be relieved by raising the leg for one hour.   If unable to reapply in the morning, keep compression on until next dressing change.  Whenever you sit raise your ankle above your hips to promote wound healing.     Protein:  A diet high in protein is important for wound healing, we recommend getting 90 grams of protein per day.   Taking protein shakes or bars are a good way to get extra protein in your diet. Pravin supplement 1-2 times a day.     You do not need to change the dressing on the days you are being seen at the wound clinic     Main Provider: Mone  MEGHAN Koehler March 3, 2025    Call us at [phone] if you have any questions about your wounds, if you have redness or swelling around your wound, have a fever of 101 degrees Fahrenheit or greater or if you have any other problems or concerns. We answer the phone Monday through Friday 8 am to 4 pm, please leave a message as we check the voicemail frequently throughout the day. If you have a concern over the weekend, please leave a message and we will return your call Monday. If the need is urgent, go to the ER or urgent care.    If you had a positive experience please indicate that on your patient satisfaction survey form that [facility] will be sending you.    It was a pleasure meeting with you today.  Thank you for allowing me and my team the privilege of caring for you today.  YOU are the reason we are here, and I truly hope we provided you with the excellent service you deserve.  Please let us know if there is anything else we can do for you so that we can be sure you are leaving completely satisfied with your care experience.      If you have any billing related questions please call the [facility] Business office at [phone]. The clinic staff does not handle billing related matters.    If you are scheduled to have a follow up appointment, you will receive a reminder call the day before your visit. On the appointment day please arrive 15 minutes prior to your appointment time. If you are unable to keep that appointment, please call the clinic to cancel or reschedule. If you are more than 10 minutes late or greater for your scheduled appointment time, the clinic policy is that you may be asked to reschedule.

## 2025-03-03 NOTE — PROGRESS NOTES
[facility] Wound Healing [place] Progress Note    Subject: Patient was seen at wound center by myself for the first time, for his left heel. States ulcer has been present for approx 2 years. Per chart review, patient he underwent operative excision with bone cultures in march of 2022. He was noted to have osteomyelitis a that time and was treated with prolonged oral antibiotic. In sept he also had an infection with signs of osteomyelitis. A BKA was suggested, but was declined by patient and his legal guardian.   -Currently, an MRI has been recommended and ordered. It hasn't been done yet. He lives in a group home and has nurses that assist with his wound care. He uses a motorized chair for mobility. He does use his left heel for transfers.     6/24: Follows up for left foot. MRI is now scheduled for 7/2. Hasn't been completed. States things are going well with dressing changes, but states he doesn't want any debridement done. Also hasn't gotten prevalon boots, but order was placed.   7/15: Follows up for left foot. Denies pain to site. Conversation had regarding pressure, states he has the prevalon boot, but hasn't been using it. States he only puts weight on his foot for transfers. Primarily uses his wheelchair.   8/19: Follows up for left foot. Denies pain. States has had a wound vac applied and being changed three times a week. No known issues per patient. Also haven't received any questions from home health. Uses wheelchair for mobility.      9/16: Follows up for left foot. Minimally conversational today. No known issues with wound vac or changes. Per patient and aid, no concerns. Denies pain. Denies N/F/V/C/D.       10/21: Follows up for left foot. Denies pain to site. Wound vac has been on now for about 2 months. Per patient and aid, no known issues. Is being changed three times a week. States is offloading his plantar heel. Denies N/F/V/C/D.      11/25: Follows up for left foot. Patient minimally  conversational. Yoli pain to the site. Per his nurse, he states he's much weaker than he's been. States he also isn't wearing lower extremity compression. Has completed the antibiotics.     1/6: Follows up for his left foot. Denies pain. Minimally conversational. His aid states that he spends the majority of his time with his foot down on the ground (approx 8 hours) and doesn't offload at all. He states that he can't fit the prevalon boot on his foot plate.      2/3: Follows up for his left foot. No significant concerns. Does have an area that's bleeding from his toenail. States he tried cutting his nails and cut them too short. Denies other concerns.     3/3: Follows up for left foot. Plan was for a theraskin application today. Had xray done which didn't show osteomyelitis. Doesn't provide any further history and is not conversational.      PMH:   Past Medical History:   Diagnosis Date    Adult failure to thrive 02/25/2022    COVID-19 10/19/2021    Elevated d-dimer 10/19/2021    Elevated troponin 10/19/2021    H/O febrile seizure 09/05/2013    Secondary to fever at 5 yo No subsequent seizures    History of DVT (deep vein thrombosis) 04/14/2017    History of DVT (deep vein thrombosis) December 2016, May 2017, September 2017.   Refused warfarin for treatment      History of pressure injury of skin 06/24/2018    History of pressure ulcer December 2016. Healed by April 2017.      Homeless 06/24/2018    Other acute pulmonary embolism without acute cor pulmonale (H) 10/19/2021    Pressure injury of right buttock, stage 3 (H) 07/11/2018    Sepsis (H) 07/10/2022       Social Hx:   Social History     Socioeconomic History    Marital status: Patient Declined     Spouse name: Not on file    Number of children: Not on file    Years of education: Not on file    Highest education level: Not on file   Occupational History    Not on file   Tobacco Use    Smoking status: Unknown    Smokeless tobacco: Not on file   Substance and  Sexual Activity    Alcohol use: Not on file    Drug use: Not on file    Sexual activity: Not on file   Other Topics Concern    Not on file   Social History Narrative    5/2024: Lives in a Group Home     Social Drivers of Health     Financial Resource Strain: Low Risk  (10/15/2021)    [facility]    Overall Financial Resource Strain (CARDIA)     Difficulty of Paying Living Expenses: Not hard at all   Food Insecurity: No Food Insecurity (10/15/2021)    [facility]    Hunger Vital Sign     Worried About Running Out of Food in the Last Year: Never true     Ran Out of Food in the Last Year: Never true   Transportation Needs: No Transportation Needs (10/15/2021)    [facility]    PRAPARE - Transportation     Lack of Transportation (Medical): No     Lack of Transportation (Non-Medical): No   Physical Activity: Not on file   Stress: Not on file   Social Connections: Unknown (2/6/2023)    [facility]    Social Connections     Frequency of Communication with Friends and Family: Not on file   Interpersonal Safety: Low Risk  (3/3/2025)    Interpersonal Safety     Do you feel physically and emotionally safe where you currently live?: Yes     Within the past 12 months, have you been hit, slapped, kicked or otherwise physically hurt by someone?: No     Within the past 12 months, have you been humiliated or emotionally abused in other ways by your partner or ex-partner?: No   Housing Stability: Not on file       Surgical Hx:   Past Surgical History:   Procedure Laterality Date    DEBRIDEMENT FOOT Left 03/24/2022    ([facility])    ELBOW DEBRIDEMENT Right  07/01/2020    ([facility])       Allergies:    Allergies   Allergen Reactions    Sulfa Antibiotics      Pt states cannot have sulfa because it reacts with his other medications       Medications:   Current Outpatient Medications   Medication Sig Dispense Refill    metroNIDAZOLE (FLAGYL) 500 MG tablet Apply 1 tablet (500 mg) topically daily for 21 days. Sprinkle finely crushed flagyl tablets topically onto wound(s). You may purchase a pill  (can be found at a pharmacy or online) or crush by placing in a doubled plastic bag and crush using a rolling pin. 21 tablet 0    acetaminophen (TYLENOL) 500 MG tablet Take 500 mg by mouth every 6 hours as needed for mild pain.       No current facility-administered medications for this encounter.         Objective:  Vitals:  /55 (BP Location: Left arm, Patient Position: Left side, Cuff Size: Adult Large)   Pulse 88   Temp 98  F (36.7  C)     A1C: None available      General:  Patient is alert and orientated.  NAD      Dermatologic: Left plantar heel ulcer: more fibrous today. No probe to bone or necrosis. No cellulitis. States nontender, but tenderness with debridement.   Third toe nail: area of subungual bleeding. No cellulitis, open lesions or signs of infection.     .   Wound (used by OP I only) 10/06/23 0905 Left plantar heel (Active)   Thickness/Stage Stage 3 03/03/25 1158   Base slough;granulating 03/03/25 1158   Periwound intact;edematous 03/03/25 1158   Periwound Temperature warm 03/03/25 1158   Periwound Skin Turgor firm 03/03/25 1158   Edges callused 03/03/25 1158   Length (cm) 4.2 03/03/25 1158   Width (cm) 3.7 03/03/25 1158   Depth (cm) 1 03/03/25 1158   Wound (cm^2) 15.54 cm^2 03/03/25 1158   Wound Volume (cm^3) 15.54 cm^3 03/03/25 1158   Wound healing % -107.2 03/03/25 1158   Drainage Characteristics/Odor serosanguineous 03/03/25 1158   Drainage Amount moderate 03/03/25 1158   Care, Wound debrided 03/03/25 1158       Wound (used by OP I only)  03/03/25 1208 ankle anterior;left (Active)   Thickness/Stage full thickness 03/03/25 1158   Base granulating 03/03/25 1158   Periwound intact 03/03/25 1158   Periwound Temperature warm 03/03/25 1158   Periwound Skin Turgor soft 03/03/25 1158   Edges open 03/03/25 1158   Length (cm) 1.8 03/03/25 1158   Width (cm) 0.5 03/03/25 1158   Depth (cm) 0.2 03/03/25 1158   Wound (cm^2) 0.9 cm^2 03/03/25 1158   Wound Volume (cm^3) 0.18 cm^3 03/03/25 1158   Drainage Characteristics/Odor serosanguineous 03/03/25 1158   Drainage Amount moderate 03/03/25 1158   Care, Wound debrided 03/03/25 1158          Vascular: DP & PT pulses are intact & regular bilaterally.  No significant edema or varicosities noted.  CFT and skin temperature is normal to both lower extremities.     Neurologic: Lower extremity sensation is absent.     Musculoskeletal: Patient is nonambulatory.    Imaging:    Right Digital brachial index: 120, index of 0.98  Left Digital Brachial index: 118, index of 0.97     Waveforms: Distal posterior tibial and dorsalis pedis waveforms are  triphasic. Both digital waveforms intact.                                                                      IMPRESSION: Normal CASSANDRA examination     I personally reviewed the images and agree with the reports as stated     Left foot XR 11/14:   MPRESSION:  There is a prominent soft tissue defect along the plantar  aspect of the heel. The underlying calcaneus shows no cortical  destructive change or erosions to suggest osteomyelitis by radiograph.  MRI could assess further if needed. Bones are demineralized.     I personally reviewed the images and agree with the reports as stated.  -No signs of osteomyelitis      Assessment:   Left heel ulcer --> getting larger.  Left dorsal foot/ankle ulcer  Lower extremity edema   Spastic tetraplegia    Plan:    -Discussed all findings with patient. Chart and imaging reviewed.     -Plan originally for theraskin to be applied to the left foot today.  But upon evaluation, decided that the wound bed is not appropriate for a skin substitute today as it's too fibrous. Excisional debridement as below, but patient with some pain with this. Although states is not painful, he withdrawals his foot.     -Overall challenging situation. Many types of wound care have been used, but without any obvious improvement or change. Somewhat apprehensive to apply a skin sub to the bottom of the patient's foot as it's uncertain if the patient will be compliant being off of the site. The wound bed also wasn't appropriate for it today.     -Excisional debridement as below. No significant signs of infection.     -He also has new breakdown to the dorsal foot/ankle. Excisional debridement done here. No signs of infection. Dressing instructions provided. Appears compression made be rubbing on site. Care monitoring as the compression is also needed.     -Follow up in 3-4 weeks for re evaluation.       Procedure:  After verbal consent, per protocol lidocaine was applied to the wound. Excisional debridement was performed on ulcer.   #15 blade was used to debride ulcer down to and including subcutaneous tissue, on the left foot. Bleeding controlled with light pressure.  No drainage noted.  Debrided site measured 6 cm x 4 cm x 1 cm, consisting of both ulcers. Dry dressing applied to foot.  Patient tolerated procedure well.    Mone Koehler DPM                      Further instructions from your care team         03/03/2025   Jody Jenkins   1985    A DME order for supplies has been placed to [facility]. If there are any issues with your order including not receiving the order please call [facility] at [phone]. They can also provide a tracking number for you.     Dressing changes outside of clinic are being performed by Home Care and Group Home  [facility] phone [phone] fax [phone] (Twice per week)  [facility]; Meg ANTOINE phone [phone] fax  "[phone]     Plan 03/03/25   Theraskin skin substitute NOT placed; consider placement on next visit 3/24/25  Done x-ray   Offload as much as you can. If you don't offload at all, you risk losing your foot; Utilize a pillow on your foot rest if you cannot use the Prevalon boots.   Done Velcro compression wrap foot/calf pieces     Wound Dressing Change: Left Plantar Heel  - Wash your hands with soap and water before you begin your dressing change and prepare a clean surface for dressings.  -Cleanse with mild unscented soap and water (such as Cetaphil, Cerave or Dove)   -Apply a thin layer of zinc oxide paste (such as Desitin) to the macerated periwound skin  -Apply small amount of VASHE on gauze, lay into wound bed, let sit for 10 minutes, remove gauze (do not rinse)   -Sprinkle finely crushed flagyl tablet topically onto wound. You may purchase a pill  (can be found at a pharmacy or online) or crush by placing in a doubled plastic bag and crush using a rolling pin.    -Primary dressing: Apply a small amount of Iodosorb gel to 1 4x4 gauze pad and ensure the gel is in contact with full base of wound  -Secondary dressing: Cover with 1/2 of a 5x9 ABD pad  Secure with 1 roll of 4\" conforming roll gauze and medipore tape  Pull up Spandigrip size E from toes to ankle and size G from ankle to knee (or velcro compression wraps)  Change daily and as needed with soiling/saturation    Wound Dressing Change: Left anterior ankle  - Wash your hands with soap and water before you begin your dressing change and prepare a clean surface for dressings.  -Cleanse with mild unscented soap and water (such as Cetaphil, Cerave or Dove)   -PRN Apply a thin layer of zinc oxide paste (such as Desitin) to the macerated periwound skin  -Primary dressing: Apply a small piece of Fibracol directly to wound bed  -Secondary dressing: Cover with one 4x4 silicone foam border dressing such as Mepilex or similar  Pull up Spandigrip size E from " toes to ankle and size G from ankle to knee (or velcro compression wraps)  provider wants to continue with Velcro compression pieces however must diligently observe for worsening of L anterior ankle in which case you can go back to Spandigrip size F to foot and ankle  Change three times a week and as needed with soiling/saturation     Compression: provider wants to continue with Velcro compression pieces however must diligently observe for worsening of L anterior ankle in which case you can go back to Spandigrip size F to foot and ankle  Spandigrip size F to feet/ankle, Spandigrip size G to ankle/calf  OR Velcro compression wraps foot and calf pieces and can be removed at night and put back on first thing in the morning.   Please remove compression dressing if toes turn blue and/or tingle and can not be relieved by raising the leg for one hour.   If unable to reapply in the morning, keep compression on until next dressing change.  Whenever you sit raise your ankle above your hips to promote wound healing.     Protein:  A diet high in protein is important for wound healing, we recommend getting 90 grams of protein per day.   Taking protein shakes or bars are a good way to get extra protein in your diet. Pravin supplement 1-2 times a day.     You do not need to change the dressing on the days you are being seen at the wound clinic     Main Provider: DARRYL KeePIZAIAH. March 3, 2025    Call us at [phone] if you have any questions about your wounds, if you have redness or swelling around your wound, have a fever of 101 degrees Fahrenheit or greater or if you have any other problems or concerns. We answer the phone Monday through Friday 8 am to 4 pm, please leave a message as we check the voicemail frequently throughout the day. If you have a concern over the weekend, please leave a message and we will return your call Monday. If the need is urgent, go to the ER or urgent care.    If you had a positive  experience please indicate that on your patient satisfaction survey form that [facility] will be sending you.    It was a pleasure meeting with you today.  Thank you for allowing me and my team the privilege of caring for you today.  YOU are the reason we are here, and I truly hope we provided you with the excellent service you deserve.  Please let us know if there is anything else we can do for you so that we can be sure you are leaving completely satisfied with your care experience.      If you have any billing related questions please call the [facility] Business office at [phone]. The clinic staff does not handle billing related matters.    If you are scheduled to have a follow up appointment, you will receive a reminder call the day before your visit. On the appointment day please arrive 15 minutes prior to your appointment time. If you are unable to keep that appointment, please call the clinic to cancel or reschedule. If you are more than 10 minutes late or greater for your scheduled appointment time, the clinic policy is that you may be asked to reschedule.

## 2025-03-04 ENCOUNTER — MEDICAL CORRESPONDENCE (OUTPATIENT)
Dept: HEALTH INFORMATION MANAGEMENT | Facility: CLINIC | Age: 40
End: 2025-03-04
Payer: COMMERCIAL

## 2025-03-06 ENCOUNTER — TELEPHONE (OUTPATIENT)
Dept: WOUND CARE | Facility: CLINIC | Age: 40
End: 2025-03-06
Payer: COMMERCIAL

## 2025-03-07 ENCOUNTER — MEDICAL CORRESPONDENCE (OUTPATIENT)
Dept: HEALTH INFORMATION MANAGEMENT | Facility: CLINIC | Age: 40
End: 2025-03-07
Payer: COMMERCIAL

## 2025-03-24 ENCOUNTER — HOSPITAL ENCOUNTER (OUTPATIENT)
Dept: WOUND CARE | Facility: CLINIC | Age: 40
Discharge: HOME | End: 2025-03-24
Attending: PODIATRIST | Admitting: PODIATRIST
Payer: COMMERCIAL

## 2025-03-24 ENCOUNTER — MEDICAL CORRESPONDENCE (OUTPATIENT)
Dept: HEALTH INFORMATION MANAGEMENT | Facility: CLINIC | Age: 40
End: 2025-03-24

## 2025-03-24 VITALS — SYSTOLIC BLOOD PRESSURE: 137 MMHG | DIASTOLIC BLOOD PRESSURE: 70 MMHG | TEMPERATURE: 98.9 F | HEART RATE: 81 BPM

## 2025-03-24 DIAGNOSIS — L89.623 PRESSURE INJURY OF LEFT HEEL, STAGE 3 (H): Primary | Chronic | ICD-10-CM

## 2025-03-24 DIAGNOSIS — Z53.9 DIAGNOSIS NOT YET DEFINED: Primary | ICD-10-CM

## 2025-03-24 PROCEDURE — 11042 DBRDMT SUBQ TIS 1ST 20SQCM/<: CPT | Mod: LT | Performed by: PODIATRIST

## 2025-03-24 PROCEDURE — 11042 DBRDMT SUBQ TIS 1ST 20SQCM/<: CPT | Performed by: PODIATRIST

## 2025-03-24 NOTE — DISCHARGE INSTRUCTIONS
"03/24/2025   Jody Jenkins   1985    A DME order was not completed because the patient declined the need for supplies (will call if supplies are needed)    Dressing changes outside of clinic are being performed by Home Care and Group Home  [facility] phone [phone] fax [phone] (Twice per week)  [facility]; Meg RN phone [phone] fax [phone]    Plan 03/24/2025   Theraskin skin substitute not placed; consider placement on next visit 4/14/25  Done x-ray   Offload as much as you can. If you don't offload at all, you risk losing your foot; Utilize a pillow on your foot rest if you cannot use the Prevalon boots.   Done Velcro compression wrap foot/calf pieces     Wound Dressing Change: Left Plantar Heel  - Wash your hands with soap and water before you begin your dressing change and prepare a clean surface for dressings.  -Cleanse with mild unscented soap and water (such as Cetaphil, Cerave or Dove)   -Apply small amount of VASHE on gauze, lay into wound bed, let sit for 10 minutes, remove gauze (do not rinse)   -Apply a thin layer of zinc oxide paste (such as Desitin) to the macerated periwound skin  -Sprinkle finely crushed flagyl tablet topically onto wound. You may purchase a pill  (can be found at a pharmacy or online) or crush by placing in a doubled plastic bag and crush using a rolling pin.    -Primary dressing: Apply a small amount of Iodosorb gel to 1 4x4 gauze pad and ensure the gel is in contact with full base of wound  -Secondary dressing: Cover with 1/2 of a 5x9 ABD pad  Secure with 1 roll of 4\" conforming roll gauze and medipore tape  Pull up Spandigrip size E from toes to ankle and size G from ankle to knee (or velcro compression wraps)  Change daily and as needed with soiling/saturation     Wound Dressing Change: Left Anterior Ankle  - Wash your hands with soap and water before you begin your dressing change and prepare a clean surface for " dressings.  -Cleanse with mild unscented soap and water (such as Cetaphil, Cerave or Dove)   -PRN Apply a thin layer of zinc oxide paste (such as Desitin) to the macerated periwound skin  -Primary dressing: Apply a small piece of Fibracol directly to wound bed  -Secondary dressing: Cover with one 4x4 silicone foam border dressing such as Mepilex or similar  Pull up Spandigrip size E from toes to ankle and size G from ankle to knee (or velcro compression wraps)  Provider wants to continue with Velcro compression pieces however must diligently observe for worsening of L anterior ankle in which case you can go back to Spandigrip size F to foot and ankle  Change three times a week and as needed with soiling/saturation     Compression:  Spandigrip size F to feet/ankle, Spandigrip size G to ankle/calf  OR Velcro compression wraps foot and calf pieces and can be removed at night and put back on first thing in the morning.   Please remove compression dressing if toes turn blue and/or tingle and can not be relieved by raising the leg for one hour.   If unable to reapply in the morning, keep compression on until next dressing change.  Whenever you sit raise your ankle above your hips to promote wound healing.     Protein:  A diet high in protein is important for wound healing, we recommend getting 90 grams of protein per day.   Taking protein shakes or bars are a good way to get extra protein in your diet. Pravin supplement 1-2 times a day.    You do not need to change the dressing on the days you are being seen at the wound clinic     Main Provider: Mone Koehler D.P.M. March 24, 2025    Call us at [phone] if you have any questions about your wounds, if you have redness or swelling around your wound, have a fever of 101 degrees Fahrenheit or greater or if you have any other problems or concerns. We answer the phone Monday through Friday 8 am to 4 pm, please leave a message as we check the voicemail frequently  throughout the day. If you have a concern over the weekend, please leave a message and we will return your call Monday. If the need is urgent, go to the ER or urgent care.    If you had a positive experience please indicate that on your patient satisfaction survey form that [facility] will be sending you.    It was a pleasure meeting with you today.  Thank you for allowing me and my team the privilege of caring for you today.  YOU are the reason we are here, and I truly hope we provided you with the excellent service you deserve.  Please let us know if there is anything else we can do for you so that we can be sure you are leaving completely satisfied with your care experience.      If you have any billing related questions please call the [facility] Business office at [phone]. The clinic staff does not handle billing related matters.    If you are scheduled to have a follow up appointment, you will receive a reminder call the day before your visit. On the appointment day please arrive 15 minutes prior to your appointment time. If you are unable to keep that appointment, please call the clinic to cancel or reschedule. If you are more than 10 minutes late or greater for your scheduled appointment time, the clinic policy is that you may be asked to reschedule.

## 2025-03-24 NOTE — PROGRESS NOTES
[facility] [place] Progress Note    Subject: Patient was seen at wound center by myself for the first time, for his left heel. States ulcer has been present for approx 2 years. Per chart review, patient he underwent operative excision with bone cultures in march of 2022. He was noted to have osteomyelitis a that time and was treated with prolonged oral antibiotic. In sept he also had an infection with signs of osteomyelitis. A BKA was suggested, but was declined by patient and his legal guardian.   -Currently, an MRI has been recommended and ordered. It hasn't been done yet. He lives in a group home and has nurses that assist with his wound care. He uses a motorized chair for mobility. He does use his left heel for transfers.     6/24: Follows up for left foot. MRI is now scheduled for 7/2. Hasn't been completed. States things are going well with dressing changes, but states he doesn't want any debridement done. Also hasn't gotten prevalon boots, but order was placed.   7/15: Follows up for left foot. Denies pain to site. Conversation had regarding pressure, states he has the prevalon boot, but hasn't been using it. States he only puts weight on his foot for transfers. Primarily uses his wheelchair.   8/19: Follows up for left foot. Denies pain. States has had a wound vac applied and being changed three times a week. No known issues per patient. Also haven't received any questions from home health. Uses wheelchair for mobility.      9/16: Follows up for left foot. Minimally conversational today. No known issues with wound vac or changes. Per patient and aid, no concerns. Denies pain. Denies N/F/V/C/D.       10/21: Follows up for left foot. Denies pain to site. Wound vac has been on now for about 2 months. Per patient and aid, no known issues. Is being changed three times a week. States is offloading his plantar heel. Denies N/F/V/C/D.      11/25: Follows up for left foot. Patient minimally  conversational. Yoli pain to the site. Per his nurse, he states he's much weaker than he's been. States he also isn't wearing lower extremity compression. Has completed the antibiotics.     1/6: Follows up for his left foot. Denies pain. Minimally conversational. His aid states that he spends the majority of his time with his foot down on the ground (approx 8 hours) and doesn't offload at all. He states that he can't fit the prevalon boot on his foot plate.      2/3: Follows up for his left foot. No significant concerns. Does have an area that's bleeding from his toenail. States he tried cutting his nails and cut them too short. Denies other concerns.      3/3: Follows up for left foot. Plan was for a theraskin application today. Had xray done which didn't show osteomyelitis. Doesn't provide any further history and is not conversational.     3/24: Follows up for his left foot. His guardian is present today. Denies pain or concerns to the bottom of his foot. States applies his compression wraps himself. Denies N/F/V/C/D.     PMH:   Past Medical History:   Diagnosis Date    Adult failure to thrive 02/25/2022    COVID-19 10/19/2021    Elevated d-dimer 10/19/2021    Elevated troponin 10/19/2021    H/O febrile seizure 09/05/2013    Secondary to fever at 7 yo No subsequent seizures    History of DVT (deep vein thrombosis) 04/14/2017    History of DVT (deep vein thrombosis) December 2016, May 2017, September 2017.   Refused warfarin for treatment      History of pressure injury of skin 06/24/2018    History of pressure ulcer December 2016. Healed by April 2017.      Homeless 06/24/2018    Other acute pulmonary embolism without acute cor pulmonale (H) 10/19/2021    Pressure injury of right buttock, stage 3 (H) 07/11/2018    Sepsis (H) 07/10/2022       Social Hx:   Social History     Socioeconomic History    Marital status: Patient Declined     Spouse name: Not on file    Number of children: Not on file    Years of  education: Not on file    Highest education level: Not on file   Occupational History    Not on file   Tobacco Use    Smoking status: Unknown    Smokeless tobacco: Not on file   Substance and Sexual Activity    Alcohol use: Not on file    Drug use: Not on file    Sexual activity: Not on file   Other Topics Concern    Not on file   Social History Narrative    5/2024: Lives in a Group Home     Social Drivers of Health     Financial Resource Strain: Low Risk  (10/15/2021)    [facility]    Overall Financial Resource Strain (CARDIA)     Difficulty of Paying Living Expenses: Not hard at all   Food Insecurity: No Food Insecurity (10/15/2021)    [facility]    Hunger Vital Sign     Worried About Running Out of Food in the Last Year: Never true     Ran Out of Food in the Last Year: Never true   Transportation Needs: No Transportation Needs (10/15/2021)    [facility]    PRAPARE - Transportation     Lack of Transportation (Medical): No     Lack of Transportation (Non-Medical): No   Physical Activity: Not on file   Stress: Not on file   Social Connections: Unknown (2/6/2023)    [facility]    Social Connections     Frequency of Communication with Friends and Family: Not on file   Interpersonal Safety: Low Risk  (3/24/2025)    Interpersonal Safety     Do you feel physically and emotionally safe where you currently live?: Yes     Within the past 12 months, have you been hit, slapped, kicked or otherwise physically hurt by someone?: No     Within the past 12 months, have you been humiliated or emotionally abused in other ways by your partner or  ex-partner?: No   Housing Stability: Not on file       Surgical Hx:   Past Surgical History:   Procedure Laterality Date    DEBRIDEMENT FOOT Left 03/24/2022    ([facility])    ELBOW DEBRIDEMENT Right 07/01/2020    ([facility])       Allergies:    Allergies   Allergen Reactions    Sulfa Antibiotics      Pt states cannot have sulfa because it reacts with his other medications       Medications:   Current Outpatient Medications   Medication Sig Dispense Refill    acetaminophen (TYLENOL) 500 MG tablet Take 500 mg by mouth every 6 hours as needed for mild pain.      metroNIDAZOLE (FLAGYL) 500 MG tablet Apply 1 tablet (500 mg) topically daily for 21 days. Sprinkle finely crushed flagyl tablets topically onto wound(s). You may purchase a pill  (can be found at a pharmacy or online) or crush by placing in a doubled plastic bag and crush using a rolling pin. 21 tablet 0     No current facility-administered medications for this encounter.         Objective:  Vitals:  /70 (BP Location: Left arm, Patient Position: Sitting, Cuff Size: Adult Regular)   Pulse 81   Temp 98.9  F (37.2  C) (Temporal)     A1C: None available      General:  Patient is alert and orientated.  NAD      Dermatologic: Left plantar heel ulcer: more granular today. Less fibrous. No probe to bone or cellulitis.     Left anterior ankle ulcer: proximal site closed and healed. Distal site open. Granular and superficial. No exposed deep structures. No cellulitis.    .   Wound (used by [facility] only) 10/06/23 0905 Left plantar heel (Active)   Thickness/Stage Stage 3 03/24/25 1243   Base slough;granulating 03/24/25 1243   Periwound intact;edematous 03/24/25 1243   Periwound Temperature warm 03/24/25 1243   Periwound Skin Turgor firm 03/24/25 1243   Edges callused 03/24/25 1243   Length (cm) 4.1 03/24/25 1243   Width (cm) 3.7 03/24/25 1243   Depth (cm) 1.1 03/24/25 1243   Wound (cm^2) 15.17 cm^2 03/24/25 1243   Wound Volume (cm^3) 16.69 cm^3 03/24/25 1243    Wound healing % -102.27 03/24/25 1243   Drainage Characteristics/Odor serosanguineous 03/24/25 1243   Drainage Amount moderate 03/24/25 1243   Care, Wound debrided 03/24/25 1243       Wound (used by OP [facility] only) 03/03/25 1208 ankle anterior;left (Active)   Thickness/Stage full thickness 03/24/25 1243   Base granulating 03/24/25 1243   Periwound intact 03/24/25 1243   Periwound Temperature warm 03/24/25 1243   Periwound Skin Turgor soft 03/24/25 1243   Edges open 03/24/25 1243   Length (cm) 0.5 03/24/25 1243   Width (cm) 0.7 03/24/25 1243   Depth (cm) 0.2 03/24/25 1243   Wound (cm^2) 0.35 cm^2 03/24/25 1243   Wound Volume (cm^3) 0.07 cm^3 03/24/25 1243   Wound healing % 61.11 03/24/25 1243   Drainage Characteristics/Odor serosanguineous 03/24/25 1243   Drainage Amount moderate 03/24/25 1243   Care, Wound non-select wound debridement performed. 03/24/25 1243          Vascular: DP & PT pulses are intact & regular bilaterally.  No significant edema or varicosities noted.  CFT and skin temperature is normal to both lower extremities.     Neurologic: Lower extremity sensation is absent.     Musculoskeletal: Patient is nonambulatory.     Imaging:    Right Digital brachial index: 120, index of 0.98  Left Digital Brachial index: 118, index of 0.97     Waveforms: Distal posterior tibial and dorsalis pedis waveforms are  triphasic. Both digital waveforms intact.                                                                      IMPRESSION: Normal CASSANDRA examination     I personally reviewed the images and agree with the reports as stated    Left foot XR 11/14:   MPRESSION:  There is a prominent soft tissue defect along the plantar  aspect of the heel. The underlying calcaneus shows no cortical  destructive change or erosions to suggest osteomyelitis by radiograph.  MRI could assess further if needed. Bones are demineralized.     I personally reviewed the images and agree with the reports as stated.  -No signs of osteomyelitis       Assessment:   Left heel ulcer  Left dorsal foot/ankle ulcer  Lower extremity edema   Spastic tetraplegia    Plan:    -Discussed all findings with patient. Chart and imaging reviewed.     -Left heel ulcer improved some. Wound bed more healthy today. Granular. No signs of infection. Still sizeable.     -Left dorsal foot/ankle site: improved.     -Excisional debridement of plantar heel site as below.     -Discussed continued need for compression. Will change to edemawear to insure no excessive pressure to anterior leg.     -Will continue to re evaluate for theraskin and possible application. Would want to make sure the wound bed is appropriate and swelling is well controlled prior to this.       Procedure:  After verbal consent, per protocol lidocaine was applied to the wound. Excisional debridement was performed on ulcer.   #15 blade was used to debride ulcer down to and including subcutaneous tissue, on the left foot. Bleeding controlled with light pressure.  No drainage noted.  Debrided site measured 5 cm x 4 cm x 1 cm, consisting of both ulcers. Dry dressing applied to foot.  Patient tolerated procedure well.     Mone Koehler DPM                Further instructions from your care team         03/24/2025   Jody Jenikns   1985    A DME order was not completed because the patient declined the need for supplies (will call if supplies are needed)    Dressing changes outside of clinic are being performed by Home Care and Group Home  [facility] phone [phone] fax [phone] (Twice per week)  [facility]; Meg RN phone [phone] fax [phone]    Plan 03/24/2025   Theraskin skin substitute not placed; consider placement on next visit 4/14/25  Done x-ray   Offload as much as you can. If you don't offload at all, you risk losing your foot; Utilize a pillow on your foot rest if you cannot use the Prevalon boots.   Done Velcro compression wrap foot/calf pieces     Wound Dressing  "Change: Left Plantar Heel  - Wash your hands with soap and water before you begin your dressing change and prepare a clean surface for dressings.  -Cleanse with mild unscented soap and water (such as Cetaphil, Cerave or Dove)   -Apply small amount of VASHE on gauze, lay into wound bed, let sit for 10 minutes, remove gauze (do not rinse)   -Apply a thin layer of zinc oxide paste (such as Desitin) to the macerated periwound skin  -Sprinkle finely crushed flagyl tablet topically onto wound. You may purchase a pill  (can be found at a pharmacy or online) or crush by placing in a doubled plastic bag and crush using a rolling pin.    -Primary dressing: Apply a small amount of Iodosorb gel to 1 4x4 gauze pad and ensure the gel is in contact with full base of wound  -Secondary dressing: Cover with 1/2 of a 5x9 ABD pad  Secure with 1 roll of 4\" conforming roll gauze and medipore tape  Pull up Spandigrip size E from toes to ankle and size G from ankle to knee (or velcro compression wraps)  Change daily and as needed with soiling/saturation     Wound Dressing Change: Left Anterior Ankle  - Wash your hands with soap and water before you begin your dressing change and prepare a clean surface for dressings.  -Cleanse with mild unscented soap and water (such as Cetaphil, Cerave or Dove)   -PRN Apply a thin layer of zinc oxide paste (such as Desitin) to the macerated periwound skin  -Primary dressing: Apply a small piece of Fibracol directly to wound bed  -Secondary dressing: Cover with one 4x4 silicone foam border dressing such as Mepilex or similar  Pull up Spandigrip size E from toes to ankle and size G from ankle to knee (or velcro compression wraps)  Provider wants to continue with Velcro compression pieces however must diligently observe for worsening of L anterior ankle in which case you can go back to Spandigrip size F to foot and ankle  Change three times a week and as needed with soiling/saturation   "   Compression:  Spandigrip size F to feet/ankle, Spandigrip size G to ankle/calf  OR Velcro compression wraps foot and calf pieces and can be removed at night and put back on first thing in the morning.   Please remove compression dressing if toes turn blue and/or tingle and can not be relieved by raising the leg for one hour.   If unable to reapply in the morning, keep compression on until next dressing change.  Whenever you sit raise your ankle above your hips to promote wound healing.     Protein:  A diet high in protein is important for wound healing, we recommend getting 90 grams of protein per day.   Taking protein shakes or bars are a good way to get extra protein in your diet. Pravin supplement 1-2 times a day.    You do not need to change the dressing on the days you are being seen at the wound clinic     Main Provider: Mone Koehler D.P.M. March 24, 2025    Call us at [phone] if you have any questions about your wounds, if you have redness or swelling around your wound, have a fever of 101 degrees Fahrenheit or greater or if you have any other problems or concerns. We answer the phone Monday through Friday 8 am to 4 pm, please leave a message as we check the voicemail frequently throughout the day. If you have a concern over the weekend, please leave a message and we will return your call Monday. If the need is urgent, go to the ER or urgent care.    If you had a positive experience please indicate that on your patient satisfaction survey form that [facility] will be sending you.    It was a pleasure meeting with you today.  Thank you for allowing me and my team the privilege of caring for you today.  YOU are the reason we are here, and I truly hope we provided you with the excellent service you deserve.  Please let us know if there is anything else we can do for you so that we can be sure you are leaving completely satisfied with your care experience.      If you have any billing related  questions please call the [facility] Business office at [phone]. The clinic staff does not handle billing related matters.    If you are scheduled to have a follow up appointment, you will receive a reminder call the day before your visit. On the appointment day please arrive 15 minutes prior to your appointment time. If you are unable to keep that appointment, please call the clinic to cancel or reschedule. If you are more than 10 minutes late or greater for your scheduled appointment time, the clinic policy is that you may be asked to reschedule.

## 2025-03-31 ENCOUNTER — MEDICAL CORRESPONDENCE (OUTPATIENT)
Dept: HEALTH INFORMATION MANAGEMENT | Facility: CLINIC | Age: 40
End: 2025-03-31
Payer: COMMERCIAL

## 2025-04-14 ENCOUNTER — HOSPITAL ENCOUNTER (OUTPATIENT)
Dept: WOUND CARE | Facility: CLINIC | Age: 40
Discharge: HOME | End: 2025-04-14
Attending: PODIATRIST | Admitting: PODIATRIST
Payer: MEDICAID

## 2025-04-14 VITALS — DIASTOLIC BLOOD PRESSURE: 69 MMHG | SYSTOLIC BLOOD PRESSURE: 106 MMHG | HEART RATE: 75 BPM | TEMPERATURE: 97.3 F

## 2025-04-14 DIAGNOSIS — L89.623 PRESSURE INJURY OF LEFT HEEL, STAGE 3 (H): Primary | Chronic | ICD-10-CM

## 2025-04-14 PROCEDURE — 11042 DBRDMT SUBQ TIS 1ST 20SQCM/<: CPT | Performed by: PODIATRIST

## 2025-04-14 NOTE — PROGRESS NOTES
[facility] Wound Healing [place] Progress Note    Subject: Patient was seen at wound center by myself for the first time, for his left heel. States ulcer has been present for approx 2 years. Per chart review, patient he underwent operative excision with bone cultures in march of 2022. He was noted to have osteomyelitis a that time and was treated with prolonged oral antibiotic. In sept he also had an infection with signs of osteomyelitis. A BKA was suggested, but was declined by patient and his legal guardian.   -Currently, an MRI has been recommended and ordered. It hasn't been done yet. He lives in a group home and has nurses that assist with his wound care. He uses a motorized chair for mobility. He does use his left heel for transfers.     6/24: Follows up for left foot. MRI is now scheduled for 7/2. Hasn't been completed. States things are going well with dressing changes, but states he doesn't want any debridement done. Also hasn't gotten prevalon boots, but order was placed.   7/15: Follows up for left foot. Denies pain to site. Conversation had regarding pressure, states he has the prevalon boot, but hasn't been using it. States he only puts weight on his foot for transfers. Primarily uses his wheelchair.   8/19: Follows up for left foot. Denies pain. States has had a wound vac applied and being changed three times a week. No known issues per patient. Also haven't received any questions from home health. Uses wheelchair for mobility.      9/16: Follows up for left foot. Minimally conversational today. No known issues with wound vac or changes. Per patient and aid, no concerns. Denies pain. Denies N/F/V/C/D.       10/21: Follows up for left foot. Denies pain to site. Wound vac has been on now for about 2 months. Per patient and aid, no known issues. Is being changed three times a week. States is offloading his plantar heel. Denies N/F/V/C/D.      11/25: Follows up for left foot. Patient minimally  conversational. Yoli pain to the site. Per his nurse, he states he's much weaker than he's been. States he also isn't wearing lower extremity compression. Has completed the antibiotics.     1/6: Follows up for his left foot. Denies pain. Minimally conversational. His aid states that he spends the majority of his time with his foot down on the ground (approx 8 hours) and doesn't offload at all. He states that he can't fit the prevalon boot on his foot plate.      2/3: Follows up for his left foot. No significant concerns. Does have an area that's bleeding from his toenail. States he tried cutting his nails and cut them too short. Denies other concerns.      3/3: Follows up for left foot. Plan was for a theraskin application today. Had xray done which didn't show osteomyelitis. Doesn't provide any further history and is not conversational.      3/24: Follows up for his left foot. His guardian is present today. Denies pain or concerns to the bottom of his foot. States applies his compression wraps himself. Denies N/F/V/C/D.     4/14: Follows up for left foot. Denies pain. Has been doing flagyl and iodosorb to the wound bed. States his wheelchair is broken, but he has still be using it.     PMH:   Past Medical History:   Diagnosis Date    Adult failure to thrive 02/25/2022    COVID-19 10/19/2021    Elevated d-dimer 10/19/2021    Elevated troponin 10/19/2021    H/O febrile seizure 09/05/2013    Secondary to fever at 5 yo No subsequent seizures    History of DVT (deep vein thrombosis) 04/14/2017    History of DVT (deep vein thrombosis) December 2016, May 2017, September 2017.   Refused warfarin for treatment      History of pressure injury of skin 06/24/2018    History of pressure ulcer December 2016. Healed by April 2017.      Homeless 06/24/2018    Other acute pulmonary embolism without acute cor pulmonale (H) 10/19/2021    Pressure injury of right buttock, stage 3 (H) 07/11/2018    Sepsis (H) 07/10/2022       Social  Hx:   Social History     Socioeconomic History    Marital status: Patient Declined     Spouse name: Not on file    Number of children: Not on file    Years of education: Not on file    Highest education level: Not on file   Occupational History    Not on file   Tobacco Use    Smoking status: Unknown    Smokeless tobacco: Not on file   Substance and Sexual Activity    Alcohol use: Not on file    Drug use: Not on file    Sexual activity: Not on file   Other Topics Concern    Not on file   Social History Narrative    5/2024: Lives in a Group Home     Social Drivers of Health     Financial Resource Strain: Low Risk  (10/15/2021)    Received from [facility], [facility]    Overall Financial Resource Strain (CARDIA)     Difficulty of Paying Living Expenses: Not hard at all   Food Insecurity: No Food Insecurity (10/15/2021)    Received from [facility], [facility]    Hunger Vital Sign     Worried About Running Out of Food in the Last Year: Never true     Ran Out of Food in the Last Year: Never true   Transportation Needs: No Transportation Needs (10/15/2021)    Received from [facility] [employer] [facility], [facility]    PRAPARE - Transportation     Lack of Transportation (Medical): No     Lack of Transportation (Non-Medical): No   Physical Activity: Not on file   Stress: Not on file   Social Connections: Unknown (2/6/2023)    [facility] [facility], [facility] [employer] [facility]    Social Connections     Frequency of Communication with Friends and Family: Not on file   Interpersonal Safety: Low Risk  (4/14/2025)    Interpersonal Safety     Do you feel physically and emotionally safe where you currently live?: Yes     Within the past 12 months, have you been hit, slapped, kicked or  otherwise physically hurt by someone?: No     Within the past 12 months, have you been humiliated or emotionally abused in other ways by your partner or ex-partner?: No   Housing Stability: Not on file       Surgical Hx:   Past Surgical History:   Procedure Laterality Date    DEBRIDEMENT FOOT Left 03/24/2022    ([facility])    ELBOW DEBRIDEMENT Right 07/01/2020    ([facility])       Allergies:    Allergies   Allergen Reactions    Sulfa Antibiotics      Pt states cannot have sulfa because it reacts with his other medications; patient would like this removed from chart       Medications:   Current Outpatient Medications   Medication Sig Dispense Refill    acetaminophen (TYLENOL) 500 MG tablet Take 500 mg by mouth every 6 hours as needed for mild pain.       No current facility-administered medications for this encounter.         Objective:  Vitals:  There were no vitals taken for this visit.    A1C: None available      General:  Patient is alert and orientated.  NAD      Dermatologic: Left plantar heel ulcer: remains fibro-granular. Relatively large in size. No probe to bone or cellulitis. No meaningful change.     Left anterior ankle ulcer: proximal site closed and healed. Distal site open. Granular and superficial. No exposed deep structures. No cellulitis.       .   Wound (used by [facility] only) 10/06/23 0905 Left plantar heel (Active)   Thickness/Stage Stage 3 04/14/25 1210   Base slough;granulating;pink 04/14/25 1210   Periwound intact;edematous 04/14/25 1210   Periwound Temperature warm 04/14/25 1210   Periwound Skin Turgor firm 04/14/25 1210   Edges callused 04/14/25 1210   Length (cm) 3.5 04/14/25 1210   Width (cm) 3.8 04/14/25 1210   Depth (cm) 1.3 04/14/25 1210   Wound (cm^2) 13.3 cm^2 04/14/25 1210   Wound Volume (cm^3) 17.29 cm^3 04/14/25 1210   Wound healing % -77.33 04/14/25 1210   Drainage Characteristics/Odor serosanguineous 04/14/25 1210   Drainage Amount moderate 04/14/25 1210   Care, Wound non-select  wound debridement performed. 04/14/25 1210       Wound (used by [facility] only) 03/03/25 1208 ankle anterior;left (Active)   Thickness/Stage full thickness 04/14/25 1210   Base granulating 04/14/25 1210   Periwound intact 04/14/25 1210   Periwound Temperature warm 04/14/25 1210   Periwound Skin Turgor soft 04/14/25 1210   Edges open 04/14/25 1210   Length (cm) 0.5 04/14/25 1210   Width (cm) 0.6 04/14/25 1210   Depth (cm) 0.1 04/14/25 1210   Wound (cm^2) 0.3 cm^2 04/14/25 1210   Wound Volume (cm^3) 0.03 cm^3 04/14/25 1210   Wound healing % 66.67 04/14/25 1210   Drainage Characteristics/Odor serosanguineous 04/14/25 1210   Drainage Amount moderate 04/14/25 1210   Care, Wound non-select wound debridement performed. 04/14/25 1210          Vascular: DP & PT pulses are intact & regular bilaterally.  No significant edema or varicosities noted.  CFT and skin temperature is normal to both lower extremities.     Neurologic: Lower extremity sensation is absent.     Musculoskeletal: Patient is nonambulatory.     Imaging:    Right Digital brachial index: 120, index of 0.98  Left Digital Brachial index: 118, index of 0.97     Waveforms: Distal posterior tibial and dorsalis pedis waveforms are  triphasic. Both digital waveforms intact.                                                                      IMPRESSION: Normal CASSANDRA examination     I personally reviewed the images and agree with the reports as stated     Left foot XR 11/14:   MPRESSION:  There is a prominent soft tissue defect along the plantar  aspect of the heel. The underlying calcaneus shows no cortical  destructive change or erosions to suggest osteomyelitis by radiograph.  MRI could assess further if needed. Bones are demineralized.     I personally reviewed the images and agree with the reports as stated.  -No signs of osteomyelitis    Assessment:   Left heel ulcer  Left dorsal foot/ankle ulcer  Lower extremity edema   Spastic tetraplegia     Plan:    -Discussed all  findings with patient. Chart and imaging reviewed.      -Left heel ulcer similar in appearance. Excisional debridement as below.     -Left dorsal foot/ankle site: improved.      -Discussed continued need for compression. Will change to edemawear to insure no excessive pressure to anterior leg.     -Will change dressing plan to try to continue to debride the plantar heel site. Eventually, may benefit from a skin substitute.      -Discussed with patient need for offloading and compliance with this. Recommend when transferring to use the forefoot as able.     -Discussed with patient obtaining another opinion by one of the other providers here at the wound healing institute, as we haven't really made any meaningful change. Attempted treatments have been: wound vac, significant debridements, products with flagyl, iodosorb, fibrocol and compression. May benefit from second round of wound vac due to size of the ulcer, but will defer further treatment to patient's next provider. Recommend follow up as scheduling allows with clinic and with the patient.     Procedure:  After verbal consent, per protocol lidocaine was applied to the wound. Excisional debridement was performed on ulcer.   #15 blade was used to debride ulcer down to and including subcutaneous tissue, on the left foot. Bleeding controlled with light pressure.  No drainage noted.  Debrided site measures 3 cm x 3 cm x .5 cm < 20sq cm debrided.  Dry dressing applied to foot.  Patient tolerated procedure well.    Mone Koehler DPM                Further instructions from your care team         04/14/2025   Jody Jenkins   1985    A DME order was not completed because the patient declined the need for supplies (will call if supplies are needed)     Dressing changes outside of clinic are being performed by Home Care and Group Home  [facility] phone [phone] fax [phone] (Twice per week)  [facility]; Meg ANTOINE phone [phone] fax  "[phone]    Plan 04/14/2025     Theraskin skin substitute not placed; consider placement on next visit 4/14/25  Done x-ray   Offload as much as you can. If you don't offload at all, you risk losing your foot; Utilize a pillow on your foot rest if you cannot use the Prevalon boots.        Wound Dressing Change: Left Plantar Heel  - Wash your hands with soap and water before you begin your dressing change and prepare a clean surface for dressings.  -Cleanse with mild unscented soap and water (such as Cetaphil, Cerave or Dove)   -Apply small amount of VASHE on gauze, lay into wound bed, let sit for 10 minutes, remove gauze (do not rinse)   -Apply a thin layer of zinc oxide paste (such as Desitin) to the macerated periwound skin  -Sprinkle finely crushed flagyl tablet topically onto wound. You may purchase a pill  (can be found at a pharmacy or online) or crush by placing in a doubled plastic bag and crush using a rolling pin.    -Primary dressing: Apply a small amount of Iodosorb gel to 1 4x4 gauze pad and ensure the gel is in contact with full base of wound  -Secondary dressing: Cover with 1/2 of a 5x9 ABD pad  Secure with 1 roll of 4\" conforming roll gauze and medipore tape  Pull up velcro compression wraps  Change daily and as needed with soiling/saturation     Wound Dressing Change: Left Anterior Ankle  - Wash your hands with soap and water before you begin your dressing change and prepare a clean surface for dressings.  -Cleanse with mild unscented soap and water (such as Cetaphil, Cerave or Dove)   -PRN Apply a thin layer of zinc oxide paste (such as Desitin) to the macerated periwound skin  -Primary dressing: Apply a small piece of Fibracol directly to wound bed  -Secondary dressing: Cover with one 4x4 silicone foam border dressing such as Mepilex or similar  Pull up Spandigrip size E from toes to ankle and size G from ankle to knee (or velcro compression wraps)  Provider wants to continue with Velcro " compression pieces however must diligently observe for worsening of L anterior ankle in which case you can go back to Spandigrip size F to foot and ankle  Change three times a week and as needed with soiling/saturation     Compression:  Spandigrip size F to feet/ankle, Spandigrip size G to ankle/calf  OR Velcro compression wraps foot and calf pieces and can be removed at night and put back on first thing in the morning.   Please remove compression dressing if toes turn blue and/or tingle and can not be relieved by raising the leg for one hour.   If unable to reapply in the morning, keep compression on until next dressing change.  Whenever you sit raise your ankle above your hips to promote wound healing.     Protein:  A diet high in protein is important for wound healing, we recommend getting 90 grams of protein per day.   Taking protein shakes or bars are a good way to get extra protein in your diet. Pravin supplement 1-2 times a day.     You do not need to change the dressing on the days you are being seen at the wound clinic        Main Provider: Mone Koehler D.P.M. April 14, 2025    Call us at [phone] if you have any questions about your wounds, if you have redness or swelling around your wound, have a fever of 101 degrees Fahrenheit or greater or if you have any other problems or concerns. We answer the phone Monday through Friday 8 am to 4 pm, please leave a message as we check the voicemail frequently throughout the day. If you have a concern over the weekend, please leave a message and we will return your call Monday. If the need is urgent, go to the ER or urgent care.    If you had a positive experience please indicate that on your patient satisfaction survey form that [facility] will be sending you.    It was a pleasure meeting with you today.  Thank you for allowing me and my team the privilege of caring for you today.  YOU are the reason we are here, and I truly hope we provided you with  the excellent service you deserve.  Please let us know if there is anything else we can do for you so that we can be sure you are leaving completely satisfied with your care experience.      If you have any billing related questions please call the [facility] Business office at [phone]. The clinic staff does not handle billing related matters.    If you are scheduled to have a follow up appointment, you will receive a reminder call the day before your visit. On the appointment day please arrive 15 minutes prior to your appointment time. If you are unable to keep that appointment, please call the clinic to cancel or reschedule. If you are more than 10 minutes late or greater for your scheduled appointment time, the clinic policy is that you may be asked to reschedule.

## 2025-04-14 NOTE — DISCHARGE INSTRUCTIONS
"04/14/2025   Jody Rogervas   1985    A DME order was not completed because the patient declined the need for supplies (will call if supplies are needed)     Dressing changes outside of clinic are being performed by Home Care and Group Home  [facility] phone [phone] fax [phone] (Twice per week)  [facility]; Meg RN phone [phone] fax [phone]    Plan 04/14/2025   Theraskin skin substitute not placed; consider placement on next visit 4/14/25  Done x-ray   Offload as much as you can. If you don't offload at all, you risk losing your foot; Utilize a pillow on your foot rest if you cannot use the Prevalon boots.     Wound Dressing Change: Left Plantar Heel  - Wash your hands with soap and water before you begin your dressing change and prepare a clean surface for dressings.  -Cleanse with mild unscented soap and water (such as Cetaphil, Cerave or Dove)   -Apply small amount of VASHE on gauze, lay into wound bed, let sit for 10 minutes, remove gauze (do not rinse)   -Apply a thin layer of zinc oxide paste (such as Desitin) to the macerated periwound skin  -Apply Urgoclean Ag to wound bed  -Bolster the Urgoclean Ag to wound bed with gauze fluffed over top  -Cover with 1/2 of a 5x9 ABD pad  Secure with 1 roll of 4\" conforming roll gauze and medipore tape  Pull up velcro compression wraps  Change daily and as needed with soiling/saturation     Wound Dressing Change: Left Anterior Ankle  -Wash your hands with soap and water before you begin your dressing change and prepare a clean surface for dressings.  -Cleanse with mild unscented soap and water (such as Cetaphil, Cerave or Dove)   -PRN Apply a thin layer of zinc oxide paste (such as Desitin) to the macerated periwound skin  -Apply a small piece of Fibracol directly to wound bed  -Cover with one 4x4 silicone foam border dressing such as Mepilex or similar  -Pull up Velcro Compression Stockings  Change three times a week and as " needed with soiling/saturation     Compression:  Spandigrip size F to feet/ankle, Spandigrip size G to ankle/calf  OR Velcro compression wraps foot and calf pieces and can be removed at night and put back on first thing in the morning.   Please remove compression dressing if toes turn blue and/or tingle and can not be relieved by raising the leg for one hour.   If unable to reapply in the morning, keep compression on until next dressing change.  Whenever you sit raise your ankle above your hips to promote wound healing.     Protein:  A diet high in protein is important for wound healing, we recommend getting 90 grams of protein per day.   Taking protein shakes or bars are a good way to get extra protein in your diet. Pravin supplement 1-2 times a day.     You do not need to change the dressing on the days you are being seen at the wound clinic        Main Provider: Mone Koehler D.P.M. April 14, 2025    Call us at [phone] if you have any questions about your wounds, if you have redness or swelling around your wound, have a fever of 101 degrees Fahrenheit or greater or if you have any other problems or concerns. We answer the phone Monday through Friday 8 am to 4 pm, please leave a message as we check the voicemail frequently throughout the day. If you have a concern over the weekend, please leave a message and we will return your call Monday. If the need is urgent, go to the ER or urgent care.    If you had a positive experience please indicate that on your patient satisfaction survey form that [facility] will be sending you.    It was a pleasure meeting with you today.  Thank you for allowing me and my team the privilege of caring for you today.  YOU are the reason we are here, and I truly hope we provided you with the excellent service you deserve.  Please let us know if there is anything else we can do for you so that we can be sure you are leaving completely satisfied with your care experience.       If you have any billing related questions please call the [facility] Business office at [phone]. The clinic staff does not handle billing related matters.    If you are scheduled to have a follow up appointment, you will receive a reminder call the day before your visit. On the appointment day please arrive 15 minutes prior to your appointment time. If you are unable to keep that appointment, please call the clinic to cancel or reschedule. If you are more than 10 minutes late or greater for your scheduled appointment time, the clinic policy is that you may be asked to reschedule.

## 2025-04-28 ENCOUNTER — TELEPHONE (OUTPATIENT)
Dept: WOUND CARE | Facility: CLINIC | Age: 40
End: 2025-04-28
Payer: COMMERCIAL

## 2025-04-28 DIAGNOSIS — L89.623 PRESSURE INJURY OF LEFT HEEL, STAGE 3 (H): Primary | ICD-10-CM

## 2025-04-28 DIAGNOSIS — L97.322 NON-HEALING ULCER OF LEFT ANKLE WITH FAT LAYER EXPOSED (H): ICD-10-CM

## 2025-04-28 NOTE — TELEPHONE ENCOUNTER
Voicemail left with home care nurse ([facility]) to call clinic when able.

## 2025-04-28 NOTE — TELEPHONE ENCOUNTER
[facility] nurse (unable to hear the name given) left a vm asking if the [facility] can place a supply order for the patient as his insurance has recently changed and they are no longer able to place that order. She did not provide details on how the insurance has changed. Patient is in need of:   4 x 4 gauze  Vashe  Urgo Clean AG  Zinc cream  Kerlix     She is asking for a call back to [phone]

## 2025-04-29 NOTE — TELEPHONE ENCOUNTER
Returned call to home care nurse. She was able to provide the new insurance account number for [facility]. This was entered into the [employer] system. Supply order sent to [facility]. The home care nurse states the patient needs zinc oxide paste. Will attempt to send this to the pharmacy to see if it can be provided to the patient this way.

## 2025-05-15 ENCOUNTER — HOSPITAL ENCOUNTER (OUTPATIENT)
Dept: WOUND CARE | Facility: CLINIC | Age: 40
End: 2025-05-15
Attending: FAMILY MEDICINE
Payer: COMMERCIAL

## 2025-05-15 VITALS — SYSTOLIC BLOOD PRESSURE: 113 MMHG | DIASTOLIC BLOOD PRESSURE: 57 MMHG | TEMPERATURE: 98.9 F | HEART RATE: 80 BPM

## 2025-05-15 DIAGNOSIS — G82.50 SPASTIC TETRAPLEGIA (H): Chronic | ICD-10-CM

## 2025-05-15 DIAGNOSIS — L89.623 PRESSURE INJURY OF LEFT HEEL, STAGE 3 (H): Primary | Chronic | ICD-10-CM

## 2025-05-15 PROCEDURE — 11042 DBRDMT SUBQ TIS 1ST 20SQCM/<: CPT | Performed by: FAMILY MEDICINE

## 2025-05-15 NOTE — DISCHARGE INSTRUCTIONS
"05/15/2025   Jody Jenkins   1985    A DME (durable medical equipment) order for supplies has been placed to [email or web address]. If there are any issues with your order including not receiving the order please call [email or web address] at [phone]. They can also provide a tracking number for you.    Dressing changes outside of clinic are being performed by Home Care and Group Home  [facility] phone [phone] fax [phone] (3 times weekly)  [facility]; Meg RN phone [phone] fax [phone]    Plan 05/15/2025   Key to healing is taking pressure off of your heel  Reduce pressure by using pillow boots and floating heels with a pillow so nothing is touching the heel     Wound Dressing Change: Left Plantar Heel  - Wash your hands with soap and water before you begin your dressing change and prepare a clean surface for dressings.  - Cleanse with mild unscented soap and water (such as Cetaphil, Cerave or Dove)   - Apply small amount of VASHE on gauze, lay into wound bed, let sit for 10 minutes, remove gauze (do not rinse)   - Apply a thin layer of zinc oxide paste to the macerated periwound skin  - Apply 1/5 of a 4x5 Hydrofera Blue Transfer Ready to wound bed  - Bolster the Hydrofera Blue Transfer Ready to wound bed with gauze fluffed over top   - Okay to use sterile strip to hold HFB and Gauze in place  - Cover with 1/2 of a 5x9 ABD pad  - Secure with 1 roll of 4\" conforming roll gauze and medipore tape  - Pull up velcro compression wraps  Change daily and as needed with soiling/saturation    Compression:  Spandigrip size F to feet/ankle, Spandigrip size G to ankle/calf  OR Velcro compression wraps foot and calf pieces and can be removed at night and put back on first thing in the morning.   Please remove compression dressing if toes turn blue and/or tingle and can not be relieved by raising the leg for one hour.   If unable to reapply in the morning, keep compression on until next dressing " change.  Whenever you sit raise your ankle above your hips to promote wound healing.     Protein:  A diet high in protein is important for wound healing, we recommend getting 90 grams of protein per day.   Taking protein shakes or bars are a good way to get extra protein in your diet. Pravin supplement 1-2 times a day.          Main Provider: Ryder Lake M.D. May 15, 2025    Call us at [phone] if you have any questions about your wounds, if you have redness or swelling around your wound, have a fever of 101 degrees Fahrenheit or greater or if you have any other problems or concerns. We answer the phone Monday through Friday 8 am to 4 pm, please leave a message as we check the voicemail frequently throughout the day. If you have a concern over the weekend, please leave a message and we will return your call Monday. If the need is urgent, go to the ER or urgent care.    If you had a positive experience please indicate that on your patient satisfaction survey form that [facility] will be sending you.    It was a pleasure meeting with you today.  Thank you for allowing me and my team the privilege of caring for you today.  YOU are the reason we are here, and I truly hope we provided you with the excellent service you deserve.  Please let us know if there is anything else we can do for you so that we can be sure you are leaving completely satisfied with your care experience.      If you have any billing related questions please call the [facility] Business office at [phone]. The clinic staff does not handle billing related matters.    If you are scheduled to have a follow up appointment, you will receive a reminder call the day before your visit. On the appointment day please arrive 15 minutes prior to your appointment time. If you are unable to keep that appointment, please call the clinic to cancel or reschedule. If you are more than 10 minutes late or greater for your scheduled appointment time,  the clinic policy is that you may be asked to reschedule.

## 2025-05-15 NOTE — PROGRESS NOTES
Wound Clinic Note         Visit date: 05/15/2025       Cheif Complaint:     Jody Jenkins is a 40 year old   male had concerns including WOUND CARE.  The patient has lower extremity edema and a left heel ulcer.         HISTORY OF PRESENT ILLNESS:    Jody Jenkins reports the wound has been present since at least 2022.  The wound began due to pressure applied to the area.  He has been seen here in the wound clinic by numerous providers over the years and most recently has been working with Dr. Koehler, the podiatrist here in the wound clinic.  Previously he was diagnosed with osteomyelitis and a BKA was recommended however the patient and his guardian refused.  However on more recent imaging including an MRI done in July 2024 and an x-ray done in February 2025 there was no evidence of osteomyelitis.  The patient confirms today that he has not been on any antibiotics recently.  He is quite sure that nothing presses or rubs against his heel at any time.  He does not use his heel to propel himself while he is in his wheelchair.  He also does not have a foot board on his bed that his heel could press against.  In October 2023 an ankle-brachial index was performed which was normal.  He has some decreased sensation and decreased motor function due to tetraplegia.      At his last visit with Dr. Koehler on April 14, 2025 she switched to Urgoclean followed by an ABD pad and roll gauze.  The patient has had this bandage change 3 times a week by home health nurses.      The pateint denies fevers or chills.  They report the pain from the wound has been 4/10 and has remained about the same recently.      The patient reports they currently do not have any routine for elevating their legs.  The patient confirms they do sleep in a bed.     Today the patient reports maintaining a high protein diet, but has not been taking protein supplements lately.        The patient denies a history of diabetes, smoking or chronic  steroid use.         The patient has not had any symptoms of infection relating to the wound recently and is not currently on antibiotics.       Problem List:   Past Medical History:   Diagnosis Date    Adult failure to thrive 02/25/2022    COVID-19 10/19/2021    Elevated d-dimer 10/19/2021    Elevated troponin 10/19/2021    H/O febrile seizure 09/05/2013    Secondary to fever at 5 yo No subsequent seizures    History of DVT (deep vein thrombosis) 04/14/2017    History of DVT (deep vein thrombosis) December 2016, May 2017, September 2017.   Refused warfarin for treatment      History of pressure injury of skin 06/24/2018    History of pressure ulcer December 2016. Healed by April 2017.      Homeless 06/24/2018    Other acute pulmonary embolism without acute cor pulmonale (H) 10/19/2021    Pressure injury of right buttock, stage 3 (H) 07/11/2018    Sepsis (H) 07/10/2022             Family Hx: family history is not on file.       Surgical Hx:   Past Surgical History:   Procedure Laterality Date    DEBRIDEMENT FOOT Left 03/24/2022    ([facility])    ELBOW DEBRIDEMENT Right 07/01/2020    ([facility])          Allergies:    Allergies   Allergen Reactions    Sulfa Antibiotics      Pt states cannot have sulfa because it reacts with his other medications; patient would like this removed from chart              Medication History:    Current Outpatient Medications   Medication Sig Dispense Refill    acetaminophen (TYLENOL) 500 MG tablet Take 500 mg by mouth every 6 hours as needed for mild pain.      zinc oxide (TRIPLE PASTE) 12.8 % external ointment Apply to intact macerated skin around the wound 454 g 2     No current facility-administered medications for this encounter.         Tobacco History:  has no history on file for tobacco use.       REVIEW OF SYMPTOMS:   The review of systems was negative except as noted in the HPI.           PHYSICAL EXAMINATION:     /57 (BP Location: Right arm, Patient Position: Sitting, Cuff  "Size: Adult Regular)   Pulse 80   Temp 98.9  F (37.2  C) (Temporal)            GENERAL: The patient overall appears well and is no acute distress.   HEAD: normocephalic   EYES: Sclera and conjunctiva clear   NECK: no obvious masses   LUNGS: breathing is unlabored.   EXTREMITIES: No clubbing, cyanosis or edema   SKIN: No rashes or other abnormalities except as noted under the Wound section below.   NEUROLOGICAL: normal motor and sensory function   EDEMA: Moderate       WOUND: The wound appears healthy with no sign of infection.   Wound bed: necrotic material  Periwound: healthy intact skin  The wound overall appears fairly similar compared with his last picture with Dr. Koehler, also the wound measurements are about the same.      Also see below for wound details:       Circumferential volume measures:             No data to display                Ulceration(s)/Wound(s):   Please see the media tab under the chart review for pictures of the wounds.  Nursing staff removed dressings and cleansed wound.    Wound (used by OP [facility] only) 10/06/23 0905 Left plantar heel (Active)   Thickness/Stage Stage 3 05/15/25 1050   Base slough;pink 05/15/25 1050   Periwound intact;edematous 05/15/25 1050   Periwound Temperature warm 05/15/25 1050   Periwound Skin Turgor firm 05/15/25 1050   Edges callused 05/15/25 1050   Length (cm) 3.5 05/15/25 1050   Width (cm) 3.9 05/15/25 1050   Depth (cm) 1.2 05/15/25 1050   Wound (cm^2) 13.65 cm^2 05/15/25 1050   Wound Volume (cm^3) 16.38 cm^3 05/15/25 1050   Wound healing % -82 05/15/25 1050   Drainage Characteristics/Odor serosanguineous 05/15/25 1050   Drainage Amount moderate 05/15/25 1050   Care, Wound debrided 05/15/25 1050             No results for input(s): \"HGBA1C\", \"A1C\", \"EAG\" in the last 66170 hours.    Invalid input(s): \"WROPAXSIJ4Y\"       Recent Labs   Lab Test 11/14/24  1320   ALBUMIN 3.5              Procedure note:  Informed Consent:  Patient acknowledges that I have explained " the patient's general medical condition to him/her.  Patient has been informed and acknowledges that I have explained the risks or complications of wound debridement including, but not limited to, scarring, damage to blood vessels or surrounding areas such as nerves and organs, allergic reactions to topical and injected anaesthetic and/or skin prep solutions.  Other risks include excessive bleeding, removal of healthy tissue, infection, pain and inflammation, and prolonged or failure to heal.  Patient acknowledges that bleeding after debridement and pain may worsen after debridement and that dead/necrotic tissue may cause bacteria and toxins to be released into the bloodstream and cause sepsis or shock.  Patient acknowledges that I have explained that the wound may be larger after debridement.  Patient acknowledges that they may need serial debridements while under care in the wound department.  Patient acknowledges that they were given an opportunity to ask questions about treatment and I have answered patient's questions.    and previous providers here in the wound clinic have also gotten consent from the guardian to perform serial debridements to this area. Anesthetized as needed with lidocaine.  Using a curette and/or a scalpel I performed an excisional debridement removing all necrotic material at the left heel wound in to the level of viable subcutaneous tissue.  I obtained hemostasis with direct pressure.  The patient tolerated the procedure well.  EBL: <5 ml  Total debridement surface area: Less than 20 cm                  ASSESSMENT:   This is a 40 year old  male with a left leg ulcer, the patient also has lower extremity edema which was also managed during today's clinic visit.          PLAN:   Will try switching to bandaging the area beginning with a zinc oxide barrier cream applied to the periwound followed by Hydrofera Blue, a gauze bolster, and ABD pad and his compression stocking change 3 times a week  by home health nurses.    Separate from the wound care instructions we then discussed management strategies for lower extremity edema.  I explained the keys for managing lower extremity edema are compression and elevation.  I explained to the patient today that controlling the edema is probably the most important thing we can do to help heal the wound.  I have specifically recommended that they lay down with their legs above the level of the heart for 30 minutes at least twice a day.  I have also encouraged the patient to continue to sleep in a bed.     I have explained to the patient the importance of protein intake to wound healing.  I have explained that increasing protein intake will speed wound healing.  We discussed several types of food that are high in protein and the wound care nurse gave the patient a handout that summarizes this information.  In addition to further speed wound healing I have encouraged the patient to take a protein supplement.   The patient will return to the wound clinic in 3 to 4 weeks to see me again.        45 minutes spent on the date of the encounter doing chart review, history and exam, documentation and further activities per the note, this time excludes any procedure time      Ryder Lake MD  05/15/2025   11:24 AM   [facility] Vascular/Wound  [phone]    This note was electronically signed by Ryder Lake MD        Further instructions from your care team         05/15/2025   Jody Jenkins   1985    A DME (durable medical equipment) order for supplies has been placed to [employer]. If there are any issues with your order including not receiving the order please call our clinic at [phone]. Do not call [facility]. We are better able to help you. We can contact the [employer] rep to better assist than if you call the general [place] number. We can provide a tracking number also if needed.     In addition to your supplies, [facility] will be sending an  "ancillary kit free of charge. This kit includes gauze, gloves, and saline. You may use the saline to clean your wound in place of the soap and water if you so choose. You will receive 1 kit per 15 days of the supply order. We typically order 30 days of supplies so you will receive 2 kits. Please let us know if you would not like to receive this kit and we can communicate this to [place]. The other supplies will be billed to your insurance. Your insurance may require a copay or deductible for the supplies.     Dressing changes outside of clinic are being performed by Home Care and Group Home  [facility] phone [phone] fax [phone] (3 times weekly)  [facility]; Meg RN phone [phone] fax [phone]    Plan 05/15/2025   Key to healing is taking pressure off of your heel  Reduce pressure by using pillow boots and floating heels with a pillow so nothing is touching the heel     Wound Dressing Change: Left Plantar Heel  - Wash your hands with soap and water before you begin your dressing change and prepare a clean surface for dressings.  - Cleanse with mild unscented soap and water (such as Cetaphil, Cerave or Dove)   - Apply small amount of VASHE on gauze, lay into wound bed, let sit for 10 minutes, remove gauze (do not rinse)   - Apply a thin layer of zinc oxide paste to the macerated periwound skin  - Apply 1/5 of a 4x5 Hydrofera Blue Transfer Ready to wound bed  - Bolster the Hydrofera Blue Transfer Ready to wound bed with gauze fluffed over top   - Okay to use sterile strip to hold HFB and Gauze in place  - Cover with 1/2 of a 5x9 ABD pad  - Secure with 1 roll of 4\" conforming roll gauze and medipore tape  - Pull up velcro compression wraps  Change daily and as needed with soiling/saturation    Compression:  Spandigrip size F to feet/ankle, Spandigrip size G to ankle/calf  OR Velcro compression wraps foot and calf pieces and can be removed at night and put back on first thing in the " morning.   Please remove compression dressing if toes turn blue and/or tingle and can not be relieved by raising the leg for one hour.   If unable to reapply in the morning, keep compression on until next dressing change.  Whenever you sit raise your ankle above your hips to promote wound healing.     Protein:  A diet high in protein is important for wound healing, we recommend getting 90 grams of protein per day.   Taking protein shakes or bars are a good way to get extra protein in your diet. Pravin supplement 1-2 times a day.          Main Provider: Ryder Lake M.D. May 15, 2025    Call us at [phone] if you have any questions about your wounds, if you have redness or swelling around your wound, have a fever of 101 degrees Fahrenheit or greater or if you have any other problems or concerns. We answer the phone Monday through Friday 8 am to 4 pm, please leave a message as we check the voicemail frequently throughout the day. If you have a concern over the weekend, please leave a message and we will return your call Monday. If the need is urgent, go to the ER or urgent care.    If you had a positive experience please indicate that on your patient satisfaction survey form that [facility] will be sending you.    It was a pleasure meeting with you today.  Thank you for allowing me and my team the privilege of caring for you today.  YOU are the reason we are here, and I truly hope we provided you with the excellent service you deserve.  Please let us know if there is anything else we can do for you so that we can be sure you are leaving completely satisfied with your care experience.      If you have any billing related questions please call the [facility] Business office at [phone]. The clinic staff does not handle billing related matters.    If you are scheduled to have a follow up appointment, you will receive a reminder call the day before your visit. On the appointment day please arrive 15  minutes prior to your appointment time. If you are unable to keep that appointment, please call the clinic to cancel or reschedule. If you are more than 10 minutes late or greater for your scheduled appointment time, the clinic policy is that you may be asked to reschedule.    ,

## 2025-05-15 NOTE — ADDENDUM NOTE
Encounter addended by: Sonja Street RN on: 5/15/2025 3:24 PM   Actions taken: Order list changed, Diagnosis association updated, Edited Discharge Instructions

## 2025-05-20 ENCOUNTER — MEDICAL CORRESPONDENCE (OUTPATIENT)
Dept: HEALTH INFORMATION MANAGEMENT | Facility: CLINIC | Age: 40
End: 2025-05-20
Payer: COMMERCIAL

## 2025-05-27 DIAGNOSIS — Z53.9 DIAGNOSIS NOT YET DEFINED: Primary | ICD-10-CM

## 2025-05-27 PROCEDURE — G0179 MD RECERTIFICATION HHA PT: HCPCS | Performed by: FAMILY MEDICINE

## 2025-06-05 ENCOUNTER — HOSPITAL ENCOUNTER (EMERGENCY)
Facility: CLINIC | Age: 40
Discharge: HOME | End: 2025-06-05
Attending: EMERGENCY MEDICINE
Payer: COMMERCIAL

## 2025-06-05 ENCOUNTER — APPOINTMENT (OUTPATIENT)
Dept: GENERAL RADIOLOGY | Facility: CLINIC | Age: 40
End: 2025-06-05
Attending: EMERGENCY MEDICINE
Payer: COMMERCIAL

## 2025-06-05 ENCOUNTER — TELEPHONE (OUTPATIENT)
Dept: WOUND CARE | Facility: CLINIC | Age: 40
End: 2025-06-05

## 2025-06-05 VITALS
TEMPERATURE: 98.1 F | OXYGEN SATURATION: 97 % | SYSTOLIC BLOOD PRESSURE: 133 MMHG | HEART RATE: 62 BPM | DIASTOLIC BLOOD PRESSURE: 73 MMHG | RESPIRATION RATE: 18 BRPM

## 2025-06-05 DIAGNOSIS — R53.1 WEAKNESS: ICD-10-CM

## 2025-06-05 DIAGNOSIS — I87.8 CHRONIC VENOUS STASIS: ICD-10-CM

## 2025-06-05 DIAGNOSIS — L03.116 CELLULITIS OF LEFT LEG: ICD-10-CM

## 2025-06-05 LAB
ALBUMIN SERPL BCG-MCNC: 3.4 G/DL (ref 3.5–5.2)
ALBUMIN UR-MCNC: NEGATIVE MG/DL
ALP SERPL-CCNC: 50 U/L (ref 40–150)
ALT SERPL W P-5'-P-CCNC: 20 U/L (ref 0–70)
ANION GAP SERPL CALCULATED.3IONS-SCNC: 11 MMOL/L (ref 7–15)
APPEARANCE UR: CLEAR
AST SERPL W P-5'-P-CCNC: 44 U/L (ref 0–45)
BASE EXCESS BLDV CALC-SCNC: 1 MMOL/L (ref -3–3)
BASOPHILS # BLD AUTO: 0.1 10E3/UL (ref 0–0.2)
BASOPHILS NFR BLD AUTO: 1 %
BILIRUB SERPL-MCNC: 0.2 MG/DL
BILIRUB UR QL STRIP: NEGATIVE
BUN SERPL-MCNC: 14.7 MG/DL (ref 6–20)
CALCIUM SERPL-MCNC: 8.7 MG/DL (ref 8.8–10.4)
CHLORIDE SERPL-SCNC: 101 MMOL/L (ref 98–107)
COLOR UR AUTO: NORMAL
CREAT SERPL-MCNC: 0.96 MG/DL (ref 0.67–1.17)
EGFRCR SERPLBLD CKD-EPI 2021: >90 ML/MIN/1.73M2
EOSINOPHIL # BLD AUTO: 0.2 10E3/UL (ref 0–0.7)
EOSINOPHIL NFR BLD AUTO: 2 %
ERYTHROCYTE [DISTWIDTH] IN BLOOD BY AUTOMATED COUNT: 14 % (ref 10–15)
FLUAV RNA SPEC QL NAA+PROBE: NEGATIVE
FLUBV RNA RESP QL NAA+PROBE: NEGATIVE
GLUCOSE SERPL-MCNC: 129 MG/DL (ref 70–99)
GLUCOSE UR STRIP-MCNC: NEGATIVE MG/DL
HCO3 BLDV-SCNC: 26 MMOL/L (ref 21–28)
HCO3 SERPL-SCNC: 25 MMOL/L (ref 22–29)
HCT VFR BLD AUTO: 38.2 % (ref 40–53)
HGB BLD-MCNC: 12.2 G/DL (ref 13.3–17.7)
HGB UR QL STRIP: NEGATIVE
HOLD SPECIMEN: NORMAL
IMM GRANULOCYTES # BLD: 0 10E3/UL
IMM GRANULOCYTES NFR BLD: 0 %
KETONES UR STRIP-MCNC: NEGATIVE MG/DL
LACTATE BLD-SCNC: 1.2 MMOL/L (ref 0.7–2)
LEUKOCYTE ESTERASE UR QL STRIP: NEGATIVE
LYMPHOCYTES # BLD AUTO: 3.2 10E3/UL (ref 0.8–5.3)
LYMPHOCYTES NFR BLD AUTO: 33 %
MCH RBC QN AUTO: 27.9 PG (ref 26.5–33)
MCHC RBC AUTO-ENTMCNC: 31.9 G/DL (ref 31.5–36.5)
MCV RBC AUTO: 87 FL (ref 78–100)
MONOCYTES # BLD AUTO: 0.9 10E3/UL (ref 0–1.3)
MONOCYTES NFR BLD AUTO: 9 %
NEUTROPHILS # BLD AUTO: 5.3 10E3/UL (ref 1.6–8.3)
NEUTROPHILS NFR BLD AUTO: 55 %
NITRATE UR QL: NEGATIVE
NRBC # BLD AUTO: 0 10E3/UL
NRBC BLD AUTO-RTO: 0 /100
NT-PROBNP SERPL-MCNC: 323 PG/ML (ref 0–93)
PCO2 BLDV: 46 MM HG (ref 40–50)
PH BLDV: 7.37 [PH] (ref 7.32–7.43)
PH UR STRIP: 6.5 [PH] (ref 5–7)
PLATELET # BLD AUTO: 330 10E3/UL (ref 150–450)
PO2 BLDV: 49 MM HG (ref 25–47)
POTASSIUM SERPL-SCNC: 4.1 MMOL/L (ref 3.4–5.3)
PROCALCITONIN SERPL IA-MCNC: 0.04 NG/ML
PROT SERPL-MCNC: 6.9 G/DL (ref 6.4–8.3)
RBC # BLD AUTO: 4.38 10E6/UL (ref 4.4–5.9)
RBC URINE: 1 /HPF
RSV RNA SPEC NAA+PROBE: NEGATIVE
SAO2 % BLDV: 83 % (ref 70–75)
SARS-COV-2 RNA RESP QL NAA+PROBE: NEGATIVE
SODIUM SERPL-SCNC: 137 MMOL/L (ref 135–145)
SP GR UR STRIP: 1.01 (ref 1–1.03)
UROBILINOGEN UR STRIP-MCNC: NORMAL MG/DL
WBC # BLD AUTO: 9.6 10E3/UL (ref 4–11)
WBC URINE: 0 /HPF

## 2025-06-05 PROCEDURE — 82803 BLOOD GASES ANY COMBINATION: CPT

## 2025-06-05 PROCEDURE — 96361 HYDRATE IV INFUSION ADD-ON: CPT

## 2025-06-05 PROCEDURE — 73590 X-RAY EXAM OF LOWER LEG: CPT | Mod: LT

## 2025-06-05 PROCEDURE — 73620 X-RAY EXAM OF FOOT: CPT | Mod: LT

## 2025-06-05 PROCEDURE — 258N000003 HC RX IP 258 OP 636: Performed by: EMERGENCY MEDICINE

## 2025-06-05 PROCEDURE — 85018 HEMOGLOBIN: CPT | Performed by: EMERGENCY MEDICINE

## 2025-06-05 PROCEDURE — 83880 ASSAY OF NATRIURETIC PEPTIDE: CPT | Performed by: EMERGENCY MEDICINE

## 2025-06-05 PROCEDURE — 83605 ASSAY OF LACTIC ACID: CPT

## 2025-06-05 PROCEDURE — 99284 EMERGENCY DEPT VISIT MOD MDM: CPT | Mod: 25

## 2025-06-05 PROCEDURE — 250N000013 HC RX MED GY IP 250 OP 250 PS 637: Performed by: EMERGENCY MEDICINE

## 2025-06-05 PROCEDURE — 96360 HYDRATION IV INFUSION INIT: CPT

## 2025-06-05 PROCEDURE — 80053 COMPREHEN METABOLIC PANEL: CPT | Performed by: EMERGENCY MEDICINE

## 2025-06-05 PROCEDURE — 87040 BLOOD CULTURE FOR BACTERIA: CPT | Performed by: EMERGENCY MEDICINE

## 2025-06-05 PROCEDURE — 87637 SARSCOV2&INF A&B&RSV AMP PRB: CPT | Performed by: EMERGENCY MEDICINE

## 2025-06-05 PROCEDURE — 36415 COLL VENOUS BLD VENIPUNCTURE: CPT | Performed by: EMERGENCY MEDICINE

## 2025-06-05 PROCEDURE — 81001 URINALYSIS AUTO W/SCOPE: CPT | Performed by: EMERGENCY MEDICINE

## 2025-06-05 PROCEDURE — 81003 URINALYSIS AUTO W/O SCOPE: CPT | Performed by: EMERGENCY MEDICINE

## 2025-06-05 PROCEDURE — 84145 PROCALCITONIN (PCT): CPT | Performed by: EMERGENCY MEDICINE

## 2025-06-05 PROCEDURE — 85025 COMPLETE CBC W/AUTO DIFF WBC: CPT | Performed by: EMERGENCY MEDICINE

## 2025-06-05 RX ORDER — CEPHALEXIN 500 MG/1
500 CAPSULE ORAL 4 TIMES DAILY
Qty: 28 CAPSULE | Refills: 0 | Status: SHIPPED | OUTPATIENT
Start: 2025-06-05 | End: 2025-06-12

## 2025-06-05 RX ORDER — CEPHALEXIN 500 MG/1
500 CAPSULE ORAL ONCE
Status: COMPLETED | OUTPATIENT
Start: 2025-06-05 | End: 2025-06-05

## 2025-06-05 RX ADMIN — CEPHALEXIN 500 MG: 500 CAPSULE ORAL at 19:53

## 2025-06-05 RX ADMIN — SODIUM CHLORIDE 1000 ML: 0.9 INJECTION, SOLUTION INTRAVENOUS at 13:51

## 2025-06-05 ASSESSMENT — ACTIVITIES OF DAILY LIVING (ADL)
ADLS_ACUITY_SCORE: 57

## 2025-06-05 ASSESSMENT — COLUMBIA-SUICIDE SEVERITY RATING SCALE - C-SSRS
6. HAVE YOU EVER DONE ANYTHING, STARTED TO DO ANYTHING, OR PREPARED TO DO ANYTHING TO END YOUR LIFE?: NO
2. HAVE YOU ACTUALLY HAD ANY THOUGHTS OF KILLING YOURSELF IN THE PAST MONTH?: NO
1. IN THE PAST MONTH, HAVE YOU WISHED YOU WERE DEAD OR WISHED YOU COULD GO TO SLEEP AND NOT WAKE UP?: NO

## 2025-06-05 NOTE — TELEPHONE ENCOUNTER
[facility] returned call to clinic. They sent orders last week that needed to be signed which Dr. Lake, that is now seeing the patient, signed but added that he is signing for the wound care orders only. He won't sign for medications, physical therapy, ect. The  expressed that home care will stop temporarily until they are able to find a provider that is willing to sign for the orders other than wound care. They will be reaching to the patient to determine who his PCP is.  No further questions or concerns.

## 2025-06-05 NOTE — ED NOTES
Bed: ED02  Expected date:   Expected time:   Means of arrival:   Comments:  [id number] Leg wound to triage
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
## 2025-06-05 NOTE — ED TRIAGE NOTES
BIBA chronic left heel wound, swelling in lower legs. TO=461. Coming from group home. Swelling in legs worse last few days. Weakness. Possible UTI.

## 2025-06-10 LAB
BACTERIA SPEC CULT: NO GROWTH
BACTERIA SPEC CULT: NO GROWTH

## 2025-06-11 DIAGNOSIS — Z53.9 DIAGNOSIS NOT YET DEFINED: Primary | ICD-10-CM

## 2025-07-02 ENCOUNTER — HOSPITAL ENCOUNTER (OUTPATIENT)
Dept: WOUND CARE | Facility: CLINIC | Age: 40
Discharge: HOME | End: 2025-07-02
Attending: FAMILY MEDICINE | Admitting: FAMILY MEDICINE
Payer: COMMERCIAL

## 2025-07-02 VITALS — SYSTOLIC BLOOD PRESSURE: 120 MMHG | DIASTOLIC BLOOD PRESSURE: 83 MMHG | TEMPERATURE: 98 F | HEART RATE: 64 BPM

## 2025-07-02 DIAGNOSIS — L89.623 PRESSURE INJURY OF LEFT HEEL, STAGE 3 (H): Primary | Chronic | ICD-10-CM

## 2025-07-02 PROCEDURE — 97602 WOUND(S) CARE NON-SELECTIVE: CPT

## 2025-07-02 NOTE — DISCHARGE INSTRUCTIONS
"07/02/2025   Jody Jenkins   1985    A DME (durable medical equipment) order for supplies has been placed to [facility]. If there are any issues with your order including not receiving the order please call [facility] at [phone]. They can also provide a tracking number for you.    [facility] does not provide wound care supplies. As a courtesy to our patients, we engage with supply companies that provide provider ordered products. Due to continued changes made by insurance companies and Medicare/Medicaid, supplies ordered by your provider may be substituted or not covered therefore requiring out of pocket expenses. Your insurance plan may require a copay or deductible.     Dressing changes outside of clinic are being performed by Home Care and Group Home  [facility] phone [phone] fax [phone] (3 times weekly)  [facility]; Meg RN phone [phone] fax [phone]    Plan 07/02/2025   Key to healing is taking pressure off of your heel  Reduce pressure by using pillow boots and floating heels with a pillow so nothing is touching the heel     Wound Dressing Change: Left Plantar Heel  - Wash your hands with soap and water before you begin your dressing change and prepare a clean surface for dressings.  - Cleanse with mild unscented soap and water (such as Cetaphil, Cerave or Dove)   - Apply small amount of VASHE on gauze, lay into wound bed, let sit for 10 minutes, remove gauze (do not rinse)   - Apply a thin layer of zinc oxide paste to the macerated periwound skin  - Apply 1/5 of a 4x5 Hydrofera Blue Transfer Ready to wound bed  - Bolster the Hydrofera Blue Transfer Ready to wound bed with gauze fluffed over top   - Okay to use sterile strip to hold HFB and Gauze in place  - Cover with 1/2 of a 5x9 ABD pad  - Secure with 1 roll of 4\" conforming roll gauze and medipore tape  - Pull up velcro compression wraps  Change daily and as needed with " soiling/saturation     Compression:  Velcro compression wraps foot and calf pieces and can be removed at night and put back on first thing in the morning.   Please remove compression dressing if toes turn blue and/or tingle and can not be relieved by raising the leg for one hour.   If unable to reapply in the morning, keep compression on until next dressing change.  Whenever you sit raise your ankle above your hips to promote wound healing.     Protein:  A diet high in protein is important for wound healing, we recommend getting 90 grams of protein per day.   Taking protein shakes or bars are a good way to get extra protein in your diet. Pravin supplement 1-2 times a day.      Main Provider: Ryder Lake M.D. July 2, 2025    Call us at [phone] if you have any questions about your wounds, if you have redness or swelling around your wound, have a fever of 101 degrees Fahrenheit or greater or if you have any other problems or concerns. We answer the phone Monday through Friday 8 am to 4 pm, please leave a message as we check the voicemail frequently throughout the day. If you have a concern over the weekend, please leave a message and we will return your call Monday. If the need is urgent, go to the ER or urgent care.    If you had a positive experience please indicate that on your patient satisfaction survey form that [facility] will be sending you.    It was a pleasure meeting with you today.  Thank you for allowing our team the privilege of caring for you today.  YOU are the reason we are here, and we truly hope we provided you with the excellent service you deserve.  Please let us know if there is anything else we can do for you so that we can be sure you are leaving completely satisfied with your care experience.      If you have any billing related questions please call the [facility] Business office at [phone]. The clinic staff does not handle billing related matters.    If you are scheduled  to have a follow up appointment, you will receive a reminder call the day before your visit. On the appointment day please arrive 15 minutes prior to your appointment time. If you are unable to keep that appointment, please call the clinic to cancel or reschedule. If you are more than 10 minutes late or greater for your scheduled appointment time, the clinic policy is that you may be asked to reschedule.

## 2025-07-02 NOTE — PROGRESS NOTES
Wound Clinic Note         Visit date: 07/02/2025       Cheif Complaint:     Jody Jenkins is a 40 year old   male had concerns including WOUND CARE.  The patient has lower extremity edema and a left heel ulcer.         HISTORY OF PRESENT ILLNESS:    Jody Jenkins reports the wound has been present since at least 2022.  The wound began due to pressure applied to the area.  He has been seen here in the wound clinic by numerous providers over the years and most recently has been working with Dr. Koehler, the podiatrist here in the wound clinic.  Previously he was diagnosed with osteomyelitis and a BKA was recommended however the patient and his guardian refused.  However on more recent imaging including an MRI done in July 2024 and an x-ray done in February 2025 there was no evidence of osteomyelitis.    In October 2023 an ankle-brachial index was performed which was normal.  He has some decreased sensation and decreased motor function due to tetraplegia.    He is accompanied to the wound clinic today by the nurse manager from his group home and they both provide portions of the history.  However they provide completely contradictory versions of the history.  The patient reports that he is wearing his Velcro compression garment every day to help control his swelling but he does not wear it today and to the wound clinic.  The nurse manager reports that he frequently refuses to wear it and when he does allow the staff to put it on he takes it off fairly quickly.  The patient reports that he does elevate his legs, laying down with his legs above the heart for 20 to 30 minutes twice a day.  The nurse manager reports that she has never seen him elevate his legs.    He is quite sure that nothing presses or rubs against his heel at any time.  He does not use his heel to propel himself while he is in his wheelchair.  He also does not have a foot board on his bed that his heel could press against.      Since his  last clinic visit with me he was seen in the emergency department on June 5, 2025 with increased leg swelling and was diagnosed with leg cellulitis.  It sounds like there was not infection where the wound is.  He has completed taking all antibiotics at this time with no symptoms of an adverse reaction.    The pateint denies fevers or chills.  They report the pain from the wound has been 4/10 and has remained about the same recently.      The patient reports propping up their legs occasionally during the day, but they do not elevate their legs above the level of their heart.  The patient confirms they do sleep in a bed.  As noted above I get very different histories about the leg elevation from the patient and the group home nurse.    Today the patient reports maintaining a high protein diet, but has not been taking protein supplements lately.        The patient denies a history of diabetes, smoking or chronic steroid use.         The patient has not had any symptoms of infection relating to the wound recently and is not currently on antibiotics.       Problem List:   Past Medical History:   Diagnosis Date    Adult failure to thrive 02/25/2022    COVID-19 10/19/2021    Elevated d-dimer 10/19/2021    Elevated troponin 10/19/2021    H/O febrile seizure 09/05/2013    Secondary to fever at 5 yo No subsequent seizures    History of DVT (deep vein thrombosis) 04/14/2017    History of DVT (deep vein thrombosis) December 2016, May 2017, September 2017.   Refused warfarin for treatment      History of pressure injury of skin 06/24/2018    History of pressure ulcer December 2016. Healed by April 2017.      Homeless 06/24/2018    Other acute pulmonary embolism without acute cor pulmonale (H) 10/19/2021    Pressure injury of right buttock, stage 3 (H) 07/11/2018    Sepsis (H) 07/10/2022             Family Hx: family history is not on file.       Surgical Hx:   Past Surgical History:   Procedure Laterality Date    DEBRIDEMENT  FOOT Left 03/24/2022    ([facility])    ELBOW DEBRIDEMENT Right 07/01/2020    ([facility])          Allergies:    Allergies   Allergen Reactions    Sulfa Antibiotics      Pt states cannot have sulfa because it reacts with his other medications; patient would like this removed from chart              Medication History:    Current Outpatient Medications   Medication Sig Dispense Refill    acetaminophen (TYLENOL) 500 MG tablet Take 500 mg by mouth every 6 hours as needed for mild pain.      zinc oxide (TRIPLE PASTE) 12.8 % external ointment Apply to intact macerated skin around the wound 454 g 2     No current facility-administered medications for this encounter.         Tobacco History:  has no history on file for tobacco use.       REVIEW OF SYMPTOMS:   The review of systems was negative except as noted in the HPI.           PHYSICAL EXAMINATION:     /83 (BP Location: Left arm, Patient Position: Sitting, Cuff Size: Adult Large)   Pulse 64   Temp 98  F (36.7  C) (Temporal)            GENERAL: The patient overall appears well and is no acute distress.   HEAD: normocephalic   EYES: Sclera and conjunctiva clear   NECK: no obvious masses   LUNGS: breathing is unlabored.   EXTREMITIES: No clubbing, cyanosis or edema   SKIN: No rashes or other abnormalities except as noted under the Wound section below.   NEUROLOGICAL: normal motor and sensory function   EDEMA: Severe      WOUND: The wound appears healthy with no sign of infection.   Wound bed: necrotic material  Periwound: healthy intact skin  The wound is slightly smaller compared with his last clinic visit with me.  There was some soft wet necrotic material in the wound bed which I was able to clear away with piece of gauze, no sharp debridement was required.      Also see below for wound details:       Circumferential volume measures:             No data to display                Ulceration(s)/Wound(s):   Please see the media tab under the chart review for pictures  "of the wounds.  Nursing staff removed dressings and cleansed wound.    Wound (used by OP [facility] only) 10/06/23 0905 Left plantar heel (Active)   Thickness/Stage Stage 3 07/02/25 1251   Base slough;pink 07/02/25 1251   Periwound intact;edematous 07/02/25 1251   Periwound Temperature warm 07/02/25 1251   Periwound Skin Turgor firm 07/02/25 1251   Edges callused 07/02/25 1251   Length (cm) 3.1 07/02/25 1251   Width (cm) 3.5 07/02/25 1251   Depth (cm) 0.5 07/02/25 1251   Wound (cm^2) 10.85 cm^2 07/02/25 1251   Wound Volume (cm^3) 5.43 cm^3 07/02/25 1251   Wound healing % -44.67 07/02/25 1251   Drainage Characteristics/Odor serosanguineous 07/02/25 1251   Drainage Amount moderate 07/02/25 1251   Care, Wound non-select wound debridement performed. 07/02/25 1251               No results for input(s): \"HGBA1C\", \"A1C\", \"EAG\" in the last 85429 hours.    Invalid input(s): \"XRIYOYIHO8L\"       Recent Labs   Lab Test 11/14/24  1320   ALBUMIN 3.5              No sharp debridement performed today.                  ASSESSMENT:   This is a 40 year old  male with a left leg ulcer, the patient also has lower extremity edema which was also managed during today's clinic visit.          PLAN:   We will continue bandaging the area beginning with a zinc oxide barrier cream applied to the periwound followed by Hydrofera Blue, a gauze bolster, and ABD pad and his compression stocking change 3 times a week by home health nurses.    I explained to the patient and the group home nurse that I think the main thing we need to do here to help this wound heal faster is to control his swelling better.    Separate from the wound care instructions we then discussed management strategies for lower extremity edema.  I explained the keys for managing lower extremity edema are compression and elevation.  I have again explained to the patient today that controlling the edema is probably the most important thing we can do to help heal the wound.  I have " specifically recommended that they lay down with their legs above the level of the heart for 30 minutes at least twice a day.  I emphasized that if we can not control the edema we will likely not be able to get this wound to heal.  I have also encouraged the patient to continue to sleep in a bed.     I have encouraged the patient to continue on their high protein diet to aid in wound healing.   The patient will return to the wound clinic in 3 to 4 weeks to see me again.        30 minutes spent on the date of the encounter doing chart review, history and exam, documentation and further activities per the note, this time excludes any procedure time      Ryder Lake MD  07/02/2025   1:16 PM   [facility]/Wound  [phone]    This note was electronically signed by Ryder Lake MD        Further instructions from your care team         07/02/2025   Jody Jenkins   1985    A DME (durable medical equipment) order for supplies has been placed to [employer]. If there are any issues with your order including not receiving the order please call [facility] at [phone]. They can also provide a tracking number for you.    [facility] does not provide wound care supplies. As a courtesy to our patients, we engage with supply companies that provide provider ordered products. Due to continued changes made by insurance companies and Medicare/Medicaid, supplies ordered by your provider may be substituted or not covered therefore requiring out of pocket expenses. Your insurance plan may require a copay or deductible.     Dressing changes outside of clinic are being performed by Home Care and Group Home  [facility] phone [phone] fax [phone] (3 times weekly)  [facility] RN phone [phone] fax [phone]    Plan 07/02/2025   Key to healing is taking pressure off of your heel  Reduce pressure by using pillow boots and floating  "heels with a pillow so nothing is touching the heel     Wound Dressing Change: Left Plantar Heel  - Wash your hands with soap and water before you begin your dressing change and prepare a clean surface for dressings.  - Cleanse with mild unscented soap and water (such as Cetaphil, Cerave or Dove)   - Apply small amount of VASHE on gauze, lay into wound bed, let sit for 10 minutes, remove gauze (do not rinse)   - Apply a thin layer of zinc oxide paste to the macerated periwound skin  - Apply 1/5 of a 4x5 Hydrofera Blue Transfer Ready to wound bed  - Bolster the Hydrofera Blue Transfer Ready to wound bed with gauze fluffed over top   - Okay to use sterile strip to hold HFB and Gauze in place  - Cover with 1/2 of a 5x9 ABD pad  - Secure with 1 roll of 4\" conforming roll gauze and medipore tape  - Pull up velcro compression wraps  Change daily and as needed with soiling/saturation     Compression:  Velcro compression wraps foot and calf pieces and can be removed at night and put back on first thing in the morning.   Please remove compression dressing if toes turn blue and/or tingle and can not be relieved by raising the leg for one hour.   If unable to reapply in the morning, keep compression on until next dressing change.  Whenever you sit raise your ankle above your hips to promote wound healing.     Protein:  A diet high in protein is important for wound healing, we recommend getting 90 grams of protein per day.   Taking protein shakes or bars are a good way to get extra protein in your diet. Pravin supplement 1-2 times a day.      Main Provider: Ryder Lake M.D. July 2, 2025    Call us at [phone] if you have any questions about your wounds, if you have redness or swelling around your wound, have a fever of 101 degrees Fahrenheit or greater or if you have any other problems or concerns. We answer the phone Monday through Friday 8 am to 4 pm, please leave a message as we check the voicemail frequently " throughout the day. If you have a concern over the weekend, please leave a message and we will return your call Monday. If the need is urgent, go to the ER or urgent care.    If you had a positive experience please indicate that on your patient satisfaction survey form that [facility] will be sending you.    It was a pleasure meeting with you today.  Thank you for allowing our team the privilege of caring for you today.  YOU are the reason we are here, and we truly hope we provided you with the excellent service you deserve.  Please let us know if there is anything else we can do for you so that we can be sure you are leaving completely satisfied with your care experience.      If you have any billing related questions please call the [facility] Business office at [phone]. The clinic staff does not handle billing related matters.    If you are scheduled to have a follow up appointment, you will receive a reminder call the day before your visit. On the appointment day please arrive 15 minutes prior to your appointment time. If you are unable to keep that appointment, please call the clinic to cancel or reschedule. If you are more than 10 minutes late or greater for your scheduled appointment time, the clinic policy is that you may be asked to reschedule.        ,

## 2025-08-21 ENCOUNTER — OFFICE VISIT (OUTPATIENT)
Dept: WOUND CARE | Facility: CLINIC | Age: 40
End: 2025-08-21
Attending: FAMILY MEDICINE
Payer: COMMERCIAL

## 2025-08-21 VITALS — SYSTOLIC BLOOD PRESSURE: 133 MMHG | HEART RATE: 88 BPM | DIASTOLIC BLOOD PRESSURE: 75 MMHG | TEMPERATURE: 98.5 F

## 2025-08-21 DIAGNOSIS — L89.623 PRESSURE INJURY OF LEFT HEEL, STAGE 3 (H): Primary | Chronic | ICD-10-CM

## 2025-08-21 PROCEDURE — 97602 WOUND(S) CARE NON-SELECTIVE: CPT

## 2025-08-21 PROCEDURE — G0463 HOSPITAL OUTPT CLINIC VISIT: HCPCS | Performed by: FAMILY MEDICINE

## 2025-08-21 ASSESSMENT — PAIN SCALES - GENERAL: PAINLEVEL_OUTOF10: NO PAIN (0)
